# Patient Record
Sex: FEMALE | Race: WHITE | NOT HISPANIC OR LATINO | Employment: FULL TIME | URBAN - METROPOLITAN AREA
[De-identification: names, ages, dates, MRNs, and addresses within clinical notes are randomized per-mention and may not be internally consistent; named-entity substitution may affect disease eponyms.]

---

## 2023-01-20 PROBLEM — E03.9 HYPOTHYROID: Status: ACTIVE | Noted: 2023-01-20

## 2023-01-20 PROBLEM — M53.3 COCCYXDYNIA: Status: ACTIVE | Noted: 2023-01-20

## 2023-01-20 PROBLEM — N39.0 UTI (URINARY TRACT INFECTION): Status: ACTIVE | Noted: 2023-01-20

## 2023-01-20 PROBLEM — M62.838 MUSCLE SPASM: Status: ACTIVE | Noted: 2023-01-20

## 2023-01-20 PROBLEM — G89.29 BACK PAIN, CHRONIC: Status: ACTIVE | Noted: 2023-01-20

## 2023-01-20 PROBLEM — I87.2 VENOUS INSUFFICIENCY OF LOWER EXTREMITY: Status: ACTIVE | Noted: 2023-01-20

## 2023-01-20 PROBLEM — M54.50 LOW BACK PAIN: Status: ACTIVE | Noted: 2023-01-20

## 2023-01-20 PROBLEM — R11.2 NAUSEA AND/OR VOMITING: Status: ACTIVE | Noted: 2023-01-20

## 2023-01-20 PROBLEM — M79.18 GLUTEAL PAIN: Status: ACTIVE | Noted: 2023-01-20

## 2023-01-20 PROBLEM — R30.0 DYSURIA: Status: ACTIVE | Noted: 2023-01-20

## 2023-01-20 PROBLEM — Z20.828 EXPOSURE TO SARS VIRUS: Status: ACTIVE | Noted: 2023-01-20

## 2023-01-20 PROBLEM — J20.9 ACUTE BRONCHITIS WITH COPD (MULTI): Status: ACTIVE | Noted: 2023-01-20

## 2023-01-20 PROBLEM — I87.302 STASIS EDEMA OF LEFT LOWER EXTREMITY: Status: ACTIVE | Noted: 2023-01-20

## 2023-01-20 PROBLEM — M19.90 ARTHRITIS: Status: ACTIVE | Noted: 2023-01-20

## 2023-01-20 PROBLEM — G45.9 TRANSIENT ISCHEMIC ATTACK: Status: ACTIVE | Noted: 2023-01-20

## 2023-01-20 PROBLEM — E78.00 HYPERCHOLESTEREMIA: Status: ACTIVE | Noted: 2023-01-20

## 2023-01-20 PROBLEM — J02.9 SORE THROAT: Status: ACTIVE | Noted: 2023-01-20

## 2023-01-20 PROBLEM — M19.049 HAND ARTHRITIS: Status: ACTIVE | Noted: 2023-01-20

## 2023-01-20 PROBLEM — G47.00 INSOMNIA: Status: ACTIVE | Noted: 2023-01-20

## 2023-01-20 PROBLEM — S39.012A ACUTE MYOFASCIAL STRAIN OF LUMBOSACRAL REGION: Status: ACTIVE | Noted: 2023-01-20

## 2023-01-20 PROBLEM — M53.3 MASS OF SACRUM: Status: ACTIVE | Noted: 2023-01-20

## 2023-01-20 PROBLEM — R60.0 BILATERAL EDEMA OF LOWER EXTREMITY: Status: ACTIVE | Noted: 2023-01-20

## 2023-01-20 PROBLEM — E66.812 CLASS 2 OBESITY WITH BODY MASS INDEX (BMI) OF 38.0 TO 38.9 IN ADULT: Status: ACTIVE | Noted: 2023-01-20

## 2023-01-20 PROBLEM — R73.9 HYPERGLYCEMIA: Status: ACTIVE | Noted: 2023-01-20

## 2023-01-20 PROBLEM — E66.9 CLASS 2 OBESITY WITH BODY MASS INDEX (BMI) OF 38.0 TO 38.9 IN ADULT: Status: ACTIVE | Noted: 2023-01-20

## 2023-01-20 PROBLEM — I73.9: Status: ACTIVE | Noted: 2023-01-20

## 2023-01-20 PROBLEM — K21.9 GERD (GASTROESOPHAGEAL REFLUX DISEASE): Status: ACTIVE | Noted: 2023-01-20

## 2023-01-20 PROBLEM — R05.9 COUGH: Status: ACTIVE | Noted: 2023-01-20

## 2023-01-20 PROBLEM — I10 HTN (HYPERTENSION): Status: ACTIVE | Noted: 2023-01-20

## 2023-01-20 PROBLEM — R19.4 BOWEL HABIT CHANGES: Status: ACTIVE | Noted: 2023-01-20

## 2023-01-20 PROBLEM — E11.9 DM TYPE 2, NOT AT GOAL (MULTI): Status: ACTIVE | Noted: 2023-01-20

## 2023-01-20 PROBLEM — E11.9 DIABETES MELLITUS, STABLE (MULTI): Status: ACTIVE | Noted: 2023-01-20

## 2023-01-20 PROBLEM — R10.13 ABDOMINAL PAIN, EPIGASTRIC: Status: ACTIVE | Noted: 2023-01-20

## 2023-01-20 PROBLEM — K62.89 MASS OF ANUS: Status: ACTIVE | Noted: 2023-01-20

## 2023-01-20 PROBLEM — K58.9 IRRITABLE BOWEL SYNDROME: Status: ACTIVE | Noted: 2023-01-20

## 2023-01-20 PROBLEM — S20.212S: Status: ACTIVE | Noted: 2023-01-20

## 2023-01-20 PROBLEM — R40.0 DAYTIME SOMNOLENCE: Status: ACTIVE | Noted: 2023-01-20

## 2023-01-20 PROBLEM — G56.01 ACUTE CARPAL TUNNEL SYNDROME, RIGHT: Status: ACTIVE | Noted: 2023-01-20

## 2023-01-20 PROBLEM — M54.9 BACK PAIN, CHRONIC: Status: ACTIVE | Noted: 2023-01-20

## 2023-01-20 PROBLEM — J44.0 ACUTE BRONCHITIS WITH COPD (MULTI): Status: ACTIVE | Noted: 2023-01-20

## 2023-01-20 PROBLEM — R05.3 COUGH, PERSISTENT: Status: ACTIVE | Noted: 2023-01-20

## 2023-01-20 PROBLEM — J44.1 COPD WITH ACUTE EXACERBATION (MULTI): Status: ACTIVE | Noted: 2023-01-20

## 2023-01-20 PROBLEM — N30.01 ACUTE CYSTITIS WITH HEMATURIA: Status: ACTIVE | Noted: 2023-01-20

## 2023-01-20 PROBLEM — F17.210 CIGARETTE NICOTINE DEPENDENCE WITHOUT COMPLICATION: Status: ACTIVE | Noted: 2023-01-20

## 2023-01-20 RX ORDER — QUETIAPINE FUMARATE 50 MG/1
50 TABLET, FILM COATED ORAL NIGHTLY
COMMUNITY
End: 2024-01-30 | Stop reason: SDUPTHER

## 2023-01-20 RX ORDER — ROSUVASTATIN CALCIUM 40 MG/1
40 TABLET, COATED ORAL DAILY
COMMUNITY
End: 2023-11-13

## 2023-01-20 RX ORDER — FUROSEMIDE 20 MG/1
20 TABLET ORAL DAILY PRN
COMMUNITY
End: 2023-04-15

## 2023-01-20 RX ORDER — METFORMIN HYDROCHLORIDE 500 MG/1
500 TABLET ORAL 2 TIMES DAILY
COMMUNITY

## 2023-01-20 RX ORDER — AMMONIUM LACTATE 12 G/100G
LOTION TOPICAL AS NEEDED
COMMUNITY

## 2023-01-20 RX ORDER — IPRATROPIUM BROMIDE AND ALBUTEROL SULFATE 2.5; .5 MG/3ML; MG/3ML
3 SOLUTION RESPIRATORY (INHALATION) EVERY 4 HOURS PRN
COMMUNITY

## 2023-01-20 RX ORDER — OXYBUTYNIN CHLORIDE 15 MG/1
15 TABLET, EXTENDED RELEASE ORAL DAILY
COMMUNITY
End: 2024-05-01

## 2023-01-20 RX ORDER — PHENOBARBITAL, HYOSCYAMINE SULFATE, ATROPINE SULFATE AND SCOPOLAMINE HYDROBROMIDE .0194; .1037; 16.2; .0065 MG/1; MG/1; MG/1; MG/1
1-2 TABLET ORAL 3 TIMES DAILY
COMMUNITY
End: 2024-03-26 | Stop reason: SDUPTHER

## 2023-01-20 RX ORDER — LISINOPRIL 5 MG/1
5 TABLET ORAL DAILY
COMMUNITY
End: 2023-04-20 | Stop reason: SDUPTHER

## 2023-01-20 RX ORDER — LEVOTHYROXINE SODIUM 137 UG/1
137 CAPSULE ORAL DAILY
COMMUNITY
End: 2023-09-26 | Stop reason: SDUPTHER

## 2023-01-20 RX ORDER — VALACYCLOVIR HYDROCHLORIDE 500 MG/1
500 TABLET, FILM COATED ORAL 3 TIMES DAILY
COMMUNITY
End: 2023-09-12 | Stop reason: SDUPTHER

## 2023-01-20 RX ORDER — SEMAGLUTIDE 1.34 MG/ML
INJECTION, SOLUTION SUBCUTANEOUS
COMMUNITY
End: 2023-06-13 | Stop reason: SDUPTHER

## 2023-04-14 DIAGNOSIS — I73.9: Primary | ICD-10-CM

## 2023-04-15 RX ORDER — FUROSEMIDE 20 MG/1
TABLET ORAL
Qty: 30 TABLET | Refills: 0 | Status: SHIPPED | OUTPATIENT
Start: 2023-04-15 | End: 2023-04-20 | Stop reason: SDUPTHER

## 2023-04-20 DIAGNOSIS — I73.9: ICD-10-CM

## 2023-04-20 DIAGNOSIS — I10 PRIMARY HYPERTENSION: Primary | ICD-10-CM

## 2023-04-20 RX ORDER — LISINOPRIL 5 MG/1
5 TABLET ORAL DAILY
Qty: 90 TABLET | Refills: 3 | Status: SHIPPED | OUTPATIENT
Start: 2023-04-20 | End: 2023-04-25 | Stop reason: SDUPTHER

## 2023-04-20 RX ORDER — FUROSEMIDE 20 MG/1
20 TABLET ORAL DAILY PRN
Qty: 90 TABLET | Refills: 3 | Status: SHIPPED | OUTPATIENT
Start: 2023-04-20 | End: 2024-04-19

## 2023-04-25 DIAGNOSIS — I10 PRIMARY HYPERTENSION: ICD-10-CM

## 2023-04-25 RX ORDER — LISINOPRIL 5 MG/1
5 TABLET ORAL DAILY
Qty: 90 TABLET | Refills: 3 | Status: SHIPPED | OUTPATIENT
Start: 2023-04-25 | End: 2024-05-01

## 2023-06-13 DIAGNOSIS — E11.9 DM TYPE 2, NOT AT GOAL (MULTI): Primary | ICD-10-CM

## 2023-06-13 RX ORDER — SEMAGLUTIDE 1.34 MG/ML
0.5 INJECTION, SOLUTION SUBCUTANEOUS
Qty: 1 EACH | Refills: 1 | Status: SHIPPED | OUTPATIENT
Start: 2023-06-13 | End: 2023-09-12

## 2023-06-24 DIAGNOSIS — K21.9 GASTROESOPHAGEAL REFLUX DISEASE WITHOUT ESOPHAGITIS: Primary | ICD-10-CM

## 2023-06-26 RX ORDER — OMEPRAZOLE 40 MG/1
CAPSULE, DELAYED RELEASE ORAL
Qty: 90 CAPSULE | Refills: 0 | Status: SHIPPED | OUTPATIENT
Start: 2023-06-26 | End: 2023-10-09

## 2023-09-12 ENCOUNTER — LAB (OUTPATIENT)
Dept: LAB | Facility: LAB | Age: 63
End: 2023-09-12
Payer: COMMERCIAL

## 2023-09-12 ENCOUNTER — OFFICE VISIT (OUTPATIENT)
Dept: PRIMARY CARE | Facility: CLINIC | Age: 63
End: 2023-09-12
Payer: COMMERCIAL

## 2023-09-12 VITALS
BODY MASS INDEX: 41.62 KG/M2 | SYSTOLIC BLOOD PRESSURE: 124 MMHG | OXYGEN SATURATION: 92 % | HEART RATE: 88 BPM | WEIGHT: 265.2 LBS | HEIGHT: 67 IN | DIASTOLIC BLOOD PRESSURE: 74 MMHG

## 2023-09-12 DIAGNOSIS — E55.9 VITAMIN D DEFICIENCY: ICD-10-CM

## 2023-09-12 DIAGNOSIS — I15.9 SECONDARY HYPERTENSION: ICD-10-CM

## 2023-09-12 DIAGNOSIS — J44.1 COPD WITH ACUTE EXACERBATION (MULTI): ICD-10-CM

## 2023-09-12 DIAGNOSIS — Z00.00 HEALTHCARE MAINTENANCE: ICD-10-CM

## 2023-09-12 DIAGNOSIS — E11.9 DM TYPE 2, NOT AT GOAL (MULTI): ICD-10-CM

## 2023-09-12 DIAGNOSIS — B00.1 RECURRENT COLD SORES: ICD-10-CM

## 2023-09-12 DIAGNOSIS — E78.00 HYPERCHOLESTEREMIA: ICD-10-CM

## 2023-09-12 DIAGNOSIS — E03.9 HYPOTHYROIDISM, UNSPECIFIED TYPE: ICD-10-CM

## 2023-09-12 DIAGNOSIS — Z13.820 OSTEOPOROSIS SCREENING: ICD-10-CM

## 2023-09-12 DIAGNOSIS — E11.9 DM TYPE 2, NOT AT GOAL (MULTI): Primary | ICD-10-CM

## 2023-09-12 DIAGNOSIS — Z12.31 ENCOUNTER FOR SCREENING MAMMOGRAM FOR MALIGNANT NEOPLASM OF BREAST: ICD-10-CM

## 2023-09-12 LAB
ALANINE AMINOTRANSFERASE (SGPT) (U/L) IN SER/PLAS: 14 U/L (ref 7–45)
ALBUMIN (G/DL) IN SER/PLAS: 4.3 G/DL (ref 3.4–5)
ALBUMIN (MG/L) IN URINE: 14.3 MG/L
ALBUMIN/CREATININE (UG/MG) IN URINE: 13 UG/MG CRT (ref 0–30)
ALKALINE PHOSPHATASE (U/L) IN SER/PLAS: 99 U/L (ref 33–136)
ANION GAP IN SER/PLAS: 9 MMOL/L (ref 10–20)
APPEARANCE, URINE: CLEAR
ASPARTATE AMINOTRANSFERASE (SGOT) (U/L) IN SER/PLAS: 12 U/L (ref 9–39)
BILIRUBIN TOTAL (MG/DL) IN SER/PLAS: 0.4 MG/DL (ref 0–1.2)
BILIRUBIN, URINE: NEGATIVE
BLOOD, URINE: NEGATIVE
CALCIDIOL (25 OH VITAMIN D3) (NG/ML) IN SER/PLAS: 13 NG/ML
CALCIUM (MG/DL) IN SER/PLAS: 10.8 MG/DL (ref 8.6–10.6)
CARBON DIOXIDE, TOTAL (MMOL/L) IN SER/PLAS: 32 MMOL/L (ref 21–32)
CHLORIDE (MMOL/L) IN SER/PLAS: 102 MMOL/L (ref 98–107)
CHOLESTEROL (MG/DL) IN SER/PLAS: 138 MG/DL (ref 0–199)
CHOLESTEROL IN HDL (MG/DL) IN SER/PLAS: 38.7 MG/DL
CHOLESTEROL/HDL RATIO: 3.6
COLOR, URINE: YELLOW
CREATININE (MG/DL) IN SER/PLAS: 0.69 MG/DL (ref 0.5–1.05)
CREATININE (MG/DL) IN URINE: 110 MG/DL (ref 20–320)
ERYTHROCYTE DISTRIBUTION WIDTH (RATIO) BY AUTOMATED COUNT: 14.9 % (ref 11.5–14.5)
ERYTHROCYTE MEAN CORPUSCULAR HEMOGLOBIN CONCENTRATION (G/DL) BY AUTOMATED: 31.6 G/DL (ref 32–36)
ERYTHROCYTE MEAN CORPUSCULAR VOLUME (FL) BY AUTOMATED COUNT: 86 FL (ref 80–100)
ERYTHROCYTES (10*6/UL) IN BLOOD BY AUTOMATED COUNT: 5.2 X10E12/L (ref 4–5.2)
GFR FEMALE: >90 ML/MIN/1.73M2
GLUCOSE (MG/DL) IN SER/PLAS: 218 MG/DL (ref 74–99)
GLUCOSE, URINE: NEGATIVE MG/DL
HBA1C MFR BLD: 8.9 % (ref 4.2–6.5)
HEMATOCRIT (%) IN BLOOD BY AUTOMATED COUNT: 44.9 % (ref 36–46)
HEMOGLOBIN (G/DL) IN BLOOD: 14.2 G/DL (ref 12–16)
HEPATITIS C VIRUS AB PRESENCE IN SERUM: NONREACTIVE
KETONES, URINE: NEGATIVE MG/DL
LDL: 48 MG/DL (ref 0–99)
LEUKOCYTE ESTERASE, URINE: NEGATIVE
LEUKOCYTES (10*3/UL) IN BLOOD BY AUTOMATED COUNT: 8.8 X10E9/L (ref 4.4–11.3)
NITRITE, URINE: NEGATIVE
NON HDL CHOLESTEROL: 99 MG/DL
NRBC (PER 100 WBCS) BY AUTOMATED COUNT: 0 /100 WBC (ref 0–0)
PH, URINE: 6 (ref 5–8)
PLATELETS (10*3/UL) IN BLOOD AUTOMATED COUNT: 240 X10E9/L (ref 150–450)
POTASSIUM (MMOL/L) IN SER/PLAS: 4.3 MMOL/L (ref 3.5–5.3)
PROTEIN TOTAL: 7.6 G/DL (ref 6.4–8.2)
PROTEIN, URINE: NEGATIVE MG/DL
SODIUM (MMOL/L) IN SER/PLAS: 139 MMOL/L (ref 136–145)
SPECIFIC GRAVITY, URINE: 1.02 (ref 1–1.03)
THYROTROPIN (MIU/L) IN SER/PLAS BY DETECTION LIMIT <= 0.05 MIU/L: 6.61 MIU/L (ref 0.44–3.98)
THYROXINE (T4) FREE (NG/DL) IN SER/PLAS: 0.9 NG/DL (ref 0.78–1.48)
TRIGLYCERIDE (MG/DL) IN SER/PLAS: 258 MG/DL (ref 0–149)
UREA NITROGEN (MG/DL) IN SER/PLAS: 14 MG/DL (ref 6–23)
UROBILINOGEN, URINE: <2 MG/DL (ref 0–1.9)
VLDL: 52 MG/DL (ref 0–40)

## 2023-09-12 PROCEDURE — 82570 ASSAY OF URINE CREATININE: CPT

## 2023-09-12 PROCEDURE — 84443 ASSAY THYROID STIM HORMONE: CPT

## 2023-09-12 PROCEDURE — 1159F MED LIST DOCD IN RCRD: CPT | Performed by: FAMILY MEDICINE

## 2023-09-12 PROCEDURE — 85027 COMPLETE CBC AUTOMATED: CPT

## 2023-09-12 PROCEDURE — 82306 VITAMIN D 25 HYDROXY: CPT

## 2023-09-12 PROCEDURE — 90677 PCV20 VACCINE IM: CPT | Performed by: FAMILY MEDICINE

## 2023-09-12 PROCEDURE — 1125F AMNT PAIN NOTED PAIN PRSNT: CPT | Performed by: FAMILY MEDICINE

## 2023-09-12 PROCEDURE — 36415 COLL VENOUS BLD VENIPUNCTURE: CPT

## 2023-09-12 PROCEDURE — 99397 PER PM REEVAL EST PAT 65+ YR: CPT | Performed by: FAMILY MEDICINE

## 2023-09-12 PROCEDURE — 90471 IMMUNIZATION ADMIN: CPT | Performed by: FAMILY MEDICINE

## 2023-09-12 PROCEDURE — 80061 LIPID PANEL: CPT

## 2023-09-12 PROCEDURE — 81003 URINALYSIS AUTO W/O SCOPE: CPT

## 2023-09-12 PROCEDURE — 86803 HEPATITIS C AB TEST: CPT

## 2023-09-12 PROCEDURE — 4010F ACE/ARB THERAPY RXD/TAKEN: CPT | Performed by: FAMILY MEDICINE

## 2023-09-12 PROCEDURE — 82043 UR ALBUMIN QUANTITATIVE: CPT

## 2023-09-12 PROCEDURE — 90472 IMMUNIZATION ADMIN EACH ADD: CPT | Performed by: FAMILY MEDICINE

## 2023-09-12 PROCEDURE — 83036 HEMOGLOBIN GLYCOSYLATED A1C: CPT | Mod: CLIA WAIVED TEST | Performed by: FAMILY MEDICINE

## 2023-09-12 PROCEDURE — 1036F TOBACCO NON-USER: CPT | Performed by: FAMILY MEDICINE

## 2023-09-12 PROCEDURE — 99213 OFFICE O/P EST LOW 20 MIN: CPT | Performed by: FAMILY MEDICINE

## 2023-09-12 PROCEDURE — 90662 IIV NO PRSV INCREASED AG IM: CPT | Performed by: FAMILY MEDICINE

## 2023-09-12 PROCEDURE — 80053 COMPREHEN METABOLIC PANEL: CPT

## 2023-09-12 PROCEDURE — 3078F DIAST BP <80 MM HG: CPT | Performed by: FAMILY MEDICINE

## 2023-09-12 PROCEDURE — 3052F HG A1C>EQUAL 8.0%<EQUAL 9.0%: CPT | Performed by: FAMILY MEDICINE

## 2023-09-12 PROCEDURE — 3074F SYST BP LT 130 MM HG: CPT | Performed by: FAMILY MEDICINE

## 2023-09-12 PROCEDURE — 84439 ASSAY OF FREE THYROXINE: CPT

## 2023-09-12 RX ORDER — VALACYCLOVIR HYDROCHLORIDE 500 MG/1
500 TABLET, FILM COATED ORAL 3 TIMES DAILY PRN
Qty: 30 TABLET | Refills: 5 | Status: SHIPPED | OUTPATIENT
Start: 2023-09-12 | End: 2024-02-06 | Stop reason: SDUPTHER

## 2023-09-12 RX ORDER — TIRZEPATIDE 2.5 MG/.5ML
2.5 INJECTION, SOLUTION SUBCUTANEOUS
Qty: 6 ML | Refills: 3 | Status: SHIPPED | OUTPATIENT
Start: 2023-09-12 | End: 2024-01-03 | Stop reason: SDUPTHER

## 2023-09-12 ASSESSMENT — PAIN SCALES - GENERAL: PAINLEVEL: 2

## 2023-09-12 ASSESSMENT — ENCOUNTER SYMPTOMS: DEPRESSION: 1

## 2023-09-12 NOTE — PROGRESS NOTES
"Subjective   Patient ID: Danelle Jacobs is a 63 y.o. female who presents for Diabetes (Check sugar).    HPI patient here for diabetes check.  She has been under quite a bit of stress with her  battling lung cancer.  Patient is due multiple health maintenance updates.  We will complete a physical checkup.  Review of Systems    Objective   /74 (BP Location: Left arm, Patient Position: Sitting, BP Cuff Size: Large adult)   Pulse 88   Ht 1.702 m (5' 7\")   Wt 120 kg (265 lb 3.2 oz)   SpO2 92%   BMI 41.54 kg/m²     Physical Exam  Alert, pleasant and in no acute distress.  Neck: Supple, no JVD or carotid bruit  Heart: Regular rate and rhythm, no murmur  Lungs: Clear to auscultation  Lower extremities: No edema  Feet: clear of lesions  Assessment/Plan     A1c=8.9.  Will order Mounjaro.  Hopefully we will be able to get insurance to help cover as this will be useful in helping patient lose weight.  Health maintenance: Mammogram, DEXA scan, hep C screen, flu and pneumonia shot.  Stressed the importance of working on weight loss as well as generalized body exercises and fitness.  Medications reviewed.  Hypertension/hyperlipidemia stable.  Hypothyroidism we will recheck thyroid levels.  Vitamin D deficiency: We will recheck level.  Plan follow-up in 3 months.  We will contact patient within 1 week with lab results.     "

## 2023-09-26 DIAGNOSIS — E03.9 HYPOTHYROIDISM, UNSPECIFIED TYPE: Primary | ICD-10-CM

## 2023-09-26 RX ORDER — LEVOTHYROXINE SODIUM 13 UG/1
150 CAPSULE ORAL DAILY
Qty: 90 CAPSULE | Refills: 3 | Status: SHIPPED | OUTPATIENT
Start: 2023-09-26 | End: 2023-09-28

## 2023-11-13 DIAGNOSIS — E03.9 HYPOTHYROIDISM, UNSPECIFIED TYPE: ICD-10-CM

## 2023-11-13 DIAGNOSIS — E78.00 HYPERCHOLESTEREMIA: Primary | ICD-10-CM

## 2023-11-13 RX ORDER — LEVOTHYROXINE SODIUM 137 UG/1
137 TABLET ORAL DAILY
Qty: 90 TABLET | Refills: 3 | Status: SHIPPED | OUTPATIENT
Start: 2023-11-13

## 2023-11-13 RX ORDER — ROSUVASTATIN CALCIUM 40 MG/1
40 TABLET, COATED ORAL DAILY
Qty: 90 TABLET | Refills: 3 | Status: SHIPPED | OUTPATIENT
Start: 2023-11-13

## 2023-12-08 ENCOUNTER — TELEPHONE (OUTPATIENT)
Dept: PRIMARY CARE | Facility: CLINIC | Age: 63
End: 2023-12-08
Payer: COMMERCIAL

## 2023-12-08 DIAGNOSIS — J20.9 ACUTE BRONCHITIS, UNSPECIFIED ORGANISM: Primary | ICD-10-CM

## 2023-12-08 RX ORDER — AZITHROMYCIN 250 MG/1
TABLET, FILM COATED ORAL
Qty: 6 TABLET | Refills: 0 | Status: SHIPPED | OUTPATIENT
Start: 2023-12-08 | End: 2023-12-13

## 2023-12-08 NOTE — TELEPHONE ENCOUNTER
Pt left vm stating that she has a upper respitaory infection and knows that she does not have COVID. Pt would like rx sent to pharmacy. I also called pt and left vm to call back regarding COVID results.

## 2023-12-08 NOTE — TELEPHONE ENCOUNTER
Pt called back and tested she tested negative and is having a bad cough and would like antibiotics and a cough rx to be sent to pharmacy.

## 2023-12-27 ENCOUNTER — TELEPHONE (OUTPATIENT)
Dept: PRIMARY CARE | Facility: CLINIC | Age: 63
End: 2023-12-27
Payer: COMMERCIAL

## 2024-01-03 DIAGNOSIS — E11.9 DM TYPE 2, NOT AT GOAL (MULTI): ICD-10-CM

## 2024-01-03 RX ORDER — TIRZEPATIDE 2.5 MG/.5ML
2.5 INJECTION, SOLUTION SUBCUTANEOUS
Qty: 6 ML | Refills: 3 | Status: SHIPPED | OUTPATIENT
Start: 2024-01-03 | End: 2025-01-02

## 2024-01-03 NOTE — TELEPHONE ENCOUNTER
Pt left vm requesting refill for tirzepatide (Mounjaro) 2.5 mg/0.5 mL pen injector to be sent to mail pharmacy.

## 2024-01-18 ENCOUNTER — TELEPHONE (OUTPATIENT)
Dept: PRIMARY CARE | Facility: CLINIC | Age: 64
End: 2024-01-18
Payer: COMMERCIAL

## 2024-01-18 DIAGNOSIS — J20.9 ACUTE BRONCHITIS, UNSPECIFIED ORGANISM: Primary | ICD-10-CM

## 2024-01-18 RX ORDER — AZITHROMYCIN 250 MG/1
TABLET, FILM COATED ORAL
Qty: 6 TABLET | Refills: 0 | Status: SHIPPED | OUTPATIENT
Start: 2024-01-18 | End: 2024-01-23

## 2024-01-18 NOTE — TELEPHONE ENCOUNTER
Pt called stating that she has been sick for 1 week and Negative COVID test 01/15. Pt symptoms : congestion and dry cough. Pt states that she has been in and out of the hospital due that her  is admitted with pneumonia. Pt would like rx sent to pharmacy.

## 2024-01-30 DIAGNOSIS — G47.00 INSOMNIA, UNSPECIFIED TYPE: Primary | ICD-10-CM

## 2024-01-30 RX ORDER — QUETIAPINE FUMARATE 50 MG/1
50 TABLET, FILM COATED ORAL NIGHTLY
Qty: 90 TABLET | Refills: 3 | Status: SHIPPED | OUTPATIENT
Start: 2024-01-30 | End: 2025-01-29

## 2024-02-06 ENCOUNTER — TELEPHONE (OUTPATIENT)
Dept: PRIMARY CARE | Facility: CLINIC | Age: 64
End: 2024-02-06
Payer: COMMERCIAL

## 2024-02-06 DIAGNOSIS — B00.1 RECURRENT COLD SORES: ICD-10-CM

## 2024-02-06 RX ORDER — VALACYCLOVIR HYDROCHLORIDE 500 MG/1
500 TABLET, FILM COATED ORAL 3 TIMES DAILY PRN
Qty: 30 TABLET | Refills: 5 | Status: SHIPPED | OUTPATIENT
Start: 2024-02-06 | End: 2025-02-05

## 2024-03-14 DIAGNOSIS — J44.1 COPD WITH ACUTE EXACERBATION (MULTI): ICD-10-CM

## 2024-03-26 ENCOUNTER — TELEPHONE (OUTPATIENT)
Dept: PRIMARY CARE | Facility: CLINIC | Age: 64
End: 2024-03-26
Payer: COMMERCIAL

## 2024-03-26 DIAGNOSIS — Z00.00 WELLNESS EXAMINATION: Primary | ICD-10-CM

## 2024-03-26 RX ORDER — PHENOBARBITAL, HYOSCYAMINE SULFATE, ATROPINE SULFATE AND SCOPOLAMINE HYDROBROMIDE .0194; .1037; 16.2; .0065 MG/1; MG/1; MG/1; MG/1
1-2 TABLET ORAL 3 TIMES DAILY
Qty: 30 TABLET | Refills: 0 | Status: SHIPPED | OUTPATIENT
Start: 2024-03-26

## 2024-03-26 NOTE — TELEPHONE ENCOUNTER
Patient called asking if you can prescribe antibiotics for Throat, and ear ache to [facility] pharmacy. She also needs a refill for Donnatal

## 2024-03-27 ENCOUNTER — TELEPHONE (OUTPATIENT)
Dept: PRIMARY CARE | Facility: CLINIC | Age: 64
End: 2024-03-27
Payer: COMMERCIAL

## 2024-03-27 DIAGNOSIS — J06.9 URI, ACUTE: Primary | ICD-10-CM

## 2024-03-27 RX ORDER — AMOXICILLIN AND CLAVULANATE POTASSIUM 875; 125 MG/1; MG/1
TABLET, FILM COATED ORAL
Qty: 20 TABLET | Refills: 0 | Status: SHIPPED | OUTPATIENT
Start: 2024-03-27 | End: 2024-05-01 | Stop reason: WASHOUT

## 2024-03-27 NOTE — TELEPHONE ENCOUNTER
Pt called again today and states that she called yesterday looking for an antibiotic for her throat. Pt states that she is having a hard time swallowing and now her ear is bothering her. Pt would like to know if you could send something in for her. Pharmacy is on file and no allergies to any medications.

## 2024-04-12 ENCOUNTER — TELEPHONE (OUTPATIENT)
Dept: PRIMARY CARE | Facility: CLINIC | Age: 64
End: 2024-04-12
Payer: COMMERCIAL

## 2024-04-12 DIAGNOSIS — J01.90 ACUTE SINUSITIS, RECURRENCE NOT SPECIFIED, UNSPECIFIED LOCATION: Primary | ICD-10-CM

## 2024-04-12 RX ORDER — AMOXICILLIN 500 MG/1
500 CAPSULE ORAL 3 TIMES DAILY
Qty: 30 CAPSULE | Refills: 0 | Status: SHIPPED | OUTPATIENT
Start: 2024-04-12 | End: 2024-04-22

## 2024-04-12 NOTE — TELEPHONE ENCOUNTER
Patient called stating she has a sinus infection and a sore throat. They want to know if you can prescribe meds to [facility] on file

## 2024-04-29 ENCOUNTER — TELEPHONE (OUTPATIENT)
Dept: PRIMARY CARE | Facility: CLINIC | Age: 64
End: 2024-04-29
Payer: COMMERCIAL

## 2024-04-30 DIAGNOSIS — R11.2 NAUSEA AND VOMITING, UNSPECIFIED VOMITING TYPE: Primary | ICD-10-CM

## 2024-04-30 RX ORDER — ONDANSETRON 8 MG/1
4-8 TABLET, ORALLY DISINTEGRATING ORAL EVERY 8 HOURS PRN
Qty: 20 TABLET | Refills: 0 | Status: SHIPPED | OUTPATIENT
Start: 2024-04-30 | End: 2024-05-01 | Stop reason: ALTCHOICE

## 2024-04-30 NOTE — TELEPHONE ENCOUNTER
Patient left  stating she might have food poisoning and if you can prescribe anything to help.   C/o; nausea, vomiting, diarrhea, and fatigue  X 2day     She also wants to know if you an squeeze her in next week for a cortisone injection in her back for pain since she is going on vacation soon

## 2024-05-01 ENCOUNTER — OFFICE VISIT (OUTPATIENT)
Dept: PRIMARY CARE | Facility: CLINIC | Age: 64
End: 2024-05-01
Payer: COMMERCIAL

## 2024-05-01 VITALS
WEIGHT: 262.7 LBS | SYSTOLIC BLOOD PRESSURE: 126 MMHG | DIASTOLIC BLOOD PRESSURE: 74 MMHG | TEMPERATURE: 97.8 F | OXYGEN SATURATION: 84 % | BODY MASS INDEX: 41.14 KG/M2 | HEART RATE: 93 BPM

## 2024-05-01 DIAGNOSIS — E11.9 DM TYPE 2, NOT AT GOAL (MULTI): Primary | ICD-10-CM

## 2024-05-01 DIAGNOSIS — E03.9 HYPOTHYROIDISM, UNSPECIFIED TYPE: ICD-10-CM

## 2024-05-01 DIAGNOSIS — E78.00 HYPERCHOLESTEREMIA: ICD-10-CM

## 2024-05-01 DIAGNOSIS — I15.9 SECONDARY HYPERTENSION: ICD-10-CM

## 2024-05-01 DIAGNOSIS — M54.50 ACUTE LOW BACK PAIN WITHOUT SCIATICA, UNSPECIFIED BACK PAIN LATERALITY: ICD-10-CM

## 2024-05-01 LAB
ALBUMIN SERPL BCP-MCNC: 4 G/DL (ref 3.4–5)
ALP SERPL-CCNC: 118 U/L (ref 33–136)
ALT SERPL W P-5'-P-CCNC: 15 U/L (ref 7–45)
ANION GAP SERPL CALC-SCNC: 12 MMOL/L (ref 10–20)
AST SERPL W P-5'-P-CCNC: 13 U/L (ref 9–39)
BILIRUB SERPL-MCNC: 0.4 MG/DL (ref 0–1.2)
BUN SERPL-MCNC: 14 MG/DL (ref 6–23)
CALCIUM SERPL-MCNC: 10.7 MG/DL (ref 8.6–10.6)
CHLORIDE SERPL-SCNC: 99 MMOL/L (ref 98–107)
CO2 SERPL-SCNC: 32 MMOL/L (ref 21–32)
CREAT SERPL-MCNC: 0.64 MG/DL (ref 0.5–1.05)
EGFRCR SERPLBLD CKD-EPI 2021: >90 ML/MIN/1.73M*2
GLUCOSE SERPL-MCNC: 191 MG/DL (ref 74–99)
POTASSIUM SERPL-SCNC: 3.8 MMOL/L (ref 3.5–5.3)
PROT SERPL-MCNC: 7.5 G/DL (ref 6.4–8.2)
SODIUM SERPL-SCNC: 139 MMOL/L (ref 136–145)

## 2024-05-01 PROCEDURE — 1036F TOBACCO NON-USER: CPT | Performed by: FAMILY MEDICINE

## 2024-05-01 PROCEDURE — 80053 COMPREHEN METABOLIC PANEL: CPT

## 2024-05-01 PROCEDURE — 85027 COMPLETE CBC AUTOMATED: CPT

## 2024-05-01 PROCEDURE — 36415 COLL VENOUS BLD VENIPUNCTURE: CPT

## 2024-05-01 PROCEDURE — 99215 OFFICE O/P EST HI 40 MIN: CPT | Performed by: FAMILY MEDICINE

## 2024-05-01 PROCEDURE — 1159F MED LIST DOCD IN RCRD: CPT | Performed by: FAMILY MEDICINE

## 2024-05-01 PROCEDURE — 3074F SYST BP LT 130 MM HG: CPT | Performed by: FAMILY MEDICINE

## 2024-05-01 PROCEDURE — 1125F AMNT PAIN NOTED PAIN PRSNT: CPT | Performed by: FAMILY MEDICINE

## 2024-05-01 PROCEDURE — 83036 HEMOGLOBIN GLYCOSYLATED A1C: CPT

## 2024-05-01 PROCEDURE — 4010F ACE/ARB THERAPY RXD/TAKEN: CPT | Performed by: FAMILY MEDICINE

## 2024-05-01 PROCEDURE — 3078F DIAST BP <80 MM HG: CPT | Performed by: FAMILY MEDICINE

## 2024-05-01 RX ORDER — CYCLOBENZAPRINE HCL 10 MG
10 TABLET ORAL 3 TIMES DAILY PRN
Qty: 30 TABLET | Refills: 0 | Status: SHIPPED | OUTPATIENT
Start: 2024-05-01 | End: 2024-06-30

## 2024-05-01 ASSESSMENT — PAIN SCALES - GENERAL: PAINLEVEL: 8

## 2024-05-01 ASSESSMENT — ENCOUNTER SYMPTOMS: DEPRESSION: 1

## 2024-05-01 NOTE — PROGRESS NOTES
Subjective   Patient ID: Danelle Jacobs is a 63 y.o. female who presents for Back Pain (Back pain), Cough (Ongoing cough, sore throat. ), and Nausea (Nausea and vomiting for 1 week now. ).    HPI   Patient struggling since  .  Not taking her medicines other than quetiapine.  Patient has lumbar back pain.  It is in the upper lumbar mainly on the left.  She is planning to go to [place] next week and is worried about the long drive.      Review of Systems    Objective   /74 (BP Location: Left arm, Patient Position: Sitting, BP Cuff Size: Large adult)   Pulse 93   Temp 36.6 °C (97.8 °F) (Temporal)   Wt 119 kg (262 lb 11.2 oz)   SpO2 (!) 84%   BMI 41.14 kg/m²     Physical Exam  Alert, pleasant and in no acute distress.  Heart: Regular rate and rhythm without murmur  Lungs: Clear to auscultation  Lower extremities: No edema    Assessment/Plan   Problem List Items Addressed This Visit             ICD-10-CM    DM type 2, not at goal (Multi) - Primary E11.9    Relevant Orders    CBC    Comprehensive Metabolic Panel    Hemoglobin A1C    HTN (hypertension) I10    Relevant Orders    CBC    Comprehensive Metabolic Panel    Hypercholesteremia E78.00    Hypothyroid E03.9    Relevant Orders    CBC    Comprehensive Metabolic Panel     Other Visit Diagnoses         Codes    Acute low back pain without sciatica, unspecified back pain laterality     M54.50    Relevant Medications    cyclobenzaprine (Flexeril) 10 mg tablet        Patient here for follow-up.  She has a lot going on.  She has been quite stressed over the loss of her .  She has stopped most of her medications.  Long talk regarding the grieving process.  For her diabetes, encouraged her to get back on her medications to get her sugars under control.  Low back pain: For her back, I provided a prescription of cyclobenzaprine.  Discussed the importance of therapeutic exercises.  Discussed importance of getting out of the car and stretching  and moving during her drive next week.  Hypertension/hypercholesterolemia: Needs to get back on her medications and start working on her health.  Avenues to success discussed.  Hypothyroidism: Recommend restart her thyroid medication at 1/2 tablet daily for a few days and then resume a full tablet daily.  Follow-up in 3 months for routine checkup and labs

## 2024-05-02 LAB
ERYTHROCYTE [DISTWIDTH] IN BLOOD BY AUTOMATED COUNT: 16.1 % (ref 11.5–14.5)
EST. AVERAGE GLUCOSE BLD GHB EST-MCNC: 272 MG/DL
HBA1C MFR BLD: 11.1 %
HCT VFR BLD AUTO: 46.4 % (ref 36–46)
HGB BLD-MCNC: 13.9 G/DL (ref 12–16)
MCH RBC QN AUTO: 27.2 PG (ref 26–34)
MCHC RBC AUTO-ENTMCNC: 30 G/DL (ref 32–36)
MCV RBC AUTO: 91 FL (ref 80–100)
NRBC BLD-RTO: 0 /100 WBCS (ref 0–0)
PLATELET # BLD AUTO: 253 X10*3/UL (ref 150–450)
RBC # BLD AUTO: 5.11 X10*6/UL (ref 4–5.2)
WBC # BLD AUTO: 9.2 X10*3/UL (ref 4.4–11.3)

## 2024-05-17 ENCOUNTER — TELEPHONE (OUTPATIENT)
Dept: PRIMARY CARE | Facility: CLINIC | Age: 64
End: 2024-05-17
Payer: COMMERCIAL

## 2024-05-17 DIAGNOSIS — L50.2 URTICARIA DUE TO HEAT: Primary | ICD-10-CM

## 2024-05-17 RX ORDER — METHYLPREDNISOLONE 4 MG/1
TABLET ORAL
Qty: 21 TABLET | Refills: 0 | Status: SHIPPED | OUTPATIENT
Start: 2024-05-17 | End: 2024-05-24

## 2024-05-17 NOTE — TELEPHONE ENCOUNTER
Pt is in [place] and believes she has sun poisoning. Pt states she has red spots all tianna hear arms and hands and itches really bad. Pt states she tried benadryl am and cortisone cream and it has not help. Pt is requesting medication to be sent to pharmacy, pt will be back in town tomorrow.

## 2024-06-12 DIAGNOSIS — K21.9 GASTROESOPHAGEAL REFLUX DISEASE WITHOUT ESOPHAGITIS: ICD-10-CM

## 2024-06-12 PROBLEM — M46.1 SACROILIITIS, NOT ELSEWHERE CLASSIFIED (CMS-HCC): Status: ACTIVE | Noted: 2023-07-11

## 2024-06-12 PROBLEM — M47.817 LUMBOSACRAL SPONDYLOSIS: Status: ACTIVE | Noted: 2023-07-11

## 2024-06-12 RX ORDER — NITROFURANTOIN 25; 75 MG/1; MG/1
1 CAPSULE ORAL
COMMUNITY
Start: 2024-04-18

## 2024-06-12 RX ORDER — OMEPRAZOLE 40 MG/1
40 CAPSULE, DELAYED RELEASE ORAL
Qty: 90 CAPSULE | Refills: 2 | Status: SHIPPED | OUTPATIENT
Start: 2024-06-12 | End: 2025-03-09

## 2024-06-12 RX ORDER — OXYBUTYNIN CHLORIDE 15 MG/1
1 TABLET, EXTENDED RELEASE ORAL
COMMUNITY
Start: 2024-06-02

## 2024-06-17 DIAGNOSIS — M54.50 ACUTE LOW BACK PAIN WITHOUT SCIATICA, UNSPECIFIED BACK PAIN LATERALITY: ICD-10-CM

## 2024-06-17 RX ORDER — CYCLOBENZAPRINE HCL 10 MG
TABLET ORAL
Qty: 60 TABLET | Refills: 0 | Status: SHIPPED | OUTPATIENT
Start: 2024-06-17 | End: 2024-06-20

## 2024-06-20 DIAGNOSIS — M54.50 ACUTE LOW BACK PAIN WITHOUT SCIATICA, UNSPECIFIED BACK PAIN LATERALITY: ICD-10-CM

## 2024-06-20 RX ORDER — DULOXETIN HYDROCHLORIDE 30 MG/1
CAPSULE, DELAYED RELEASE ORAL
COMMUNITY
Start: 2024-06-19

## 2024-06-20 RX ORDER — CYCLOBENZAPRINE HCL 10 MG
TABLET ORAL
Qty: 60 TABLET | Refills: 17 | Status: SHIPPED | OUTPATIENT
Start: 2024-06-20

## 2024-07-14 DIAGNOSIS — I10 PRIMARY HYPERTENSION: ICD-10-CM

## 2024-07-14 DIAGNOSIS — I73.9: ICD-10-CM

## 2024-07-14 RX ORDER — FUROSEMIDE 20 MG/1
20 TABLET ORAL DAILY PRN
Qty: 90 TABLET | Refills: 1 | Status: SHIPPED | OUTPATIENT
Start: 2024-07-14 | End: 2024-07-15

## 2024-07-14 RX ORDER — LISINOPRIL 5 MG/1
5 TABLET ORAL DAILY
Qty: 90 TABLET | Refills: 1 | Status: SHIPPED | OUTPATIENT
Start: 2024-07-14 | End: 2024-07-15

## 2024-07-15 DIAGNOSIS — I10 PRIMARY HYPERTENSION: ICD-10-CM

## 2024-07-15 DIAGNOSIS — I73.9: ICD-10-CM

## 2024-07-15 RX ORDER — LISINOPRIL 5 MG/1
5 TABLET ORAL DAILY
Qty: 90 TABLET | Refills: 3 | Status: SHIPPED | OUTPATIENT
Start: 2024-07-15

## 2024-07-15 RX ORDER — FUROSEMIDE 20 MG/1
20 TABLET ORAL DAILY PRN
Qty: 90 TABLET | Refills: 1 | Status: SHIPPED | OUTPATIENT
Start: 2024-07-15

## 2024-07-24 ENCOUNTER — TELEPHONE (OUTPATIENT)
Dept: PRIMARY CARE | Facility: CLINIC | Age: 64
End: 2024-07-24
Payer: COMMERCIAL

## 2024-07-24 DIAGNOSIS — J01.90 ACUTE SINUSITIS, RECURRENCE NOT SPECIFIED, UNSPECIFIED LOCATION: Primary | ICD-10-CM

## 2024-07-24 RX ORDER — AMOXICILLIN 500 MG/1
500 CAPSULE ORAL 3 TIMES DAILY
Qty: 30 CAPSULE | Refills: 0 | Status: SHIPPED | OUTPATIENT
Start: 2024-07-24 | End: 2024-08-03

## 2024-07-24 NOTE — TELEPHONE ENCOUNTER
Patient left a VM stating she has a sore throat, sinus issues, and a dry cough. She tested negative for covid. Requesting an antibiotic be sent to [facility] on file.

## 2024-08-01 DIAGNOSIS — E03.9 HYPOTHYROIDISM, UNSPECIFIED TYPE: Primary | ICD-10-CM

## 2024-08-01 RX ORDER — LEVOTHYROXINE SODIUM 150 UG/1
150 TABLET ORAL DAILY
Qty: 90 TABLET | Refills: 1 | Status: SHIPPED | OUTPATIENT
Start: 2024-08-01

## 2024-08-06 ENCOUNTER — APPOINTMENT (OUTPATIENT)
Dept: PRIMARY CARE | Facility: CLINIC | Age: 64
End: 2024-08-06
Payer: COMMERCIAL

## 2024-08-06 VITALS
BODY MASS INDEX: 40.72 KG/M2 | SYSTOLIC BLOOD PRESSURE: 114 MMHG | OXYGEN SATURATION: 89 % | WEIGHT: 260 LBS | HEART RATE: 68 BPM | DIASTOLIC BLOOD PRESSURE: 74 MMHG

## 2024-08-06 DIAGNOSIS — J02.9 ACUTE PHARYNGITIS, UNSPECIFIED ETIOLOGY: ICD-10-CM

## 2024-08-06 DIAGNOSIS — E11.9 DM TYPE 2, NOT AT GOAL (MULTI): Primary | ICD-10-CM

## 2024-08-06 DIAGNOSIS — J44.1 COPD WITH ACUTE EXACERBATION (MULTI): ICD-10-CM

## 2024-08-06 PROBLEM — K76.0 STEATOSIS OF LIVER: Status: ACTIVE | Noted: 2024-08-06

## 2024-08-06 PROBLEM — Z86.39 HISTORY OF HYPOTHYROIDISM: Status: ACTIVE | Noted: 2024-08-06

## 2024-08-06 PROBLEM — R53.83 MALAISE AND FATIGUE: Status: ACTIVE | Noted: 2024-08-06

## 2024-08-06 PROBLEM — J18.0 BRONCHOPNEUMONIA: Status: ACTIVE | Noted: 2024-08-06

## 2024-08-06 PROBLEM — Z86.19 HISTORY OF VIRAL INFECTION: Status: ACTIVE | Noted: 2024-08-06

## 2024-08-06 PROBLEM — Z86.79 HISTORY OF RHEUMATIC FEVER: Status: ACTIVE | Noted: 2024-08-06

## 2024-08-06 PROBLEM — S39.92XA INJURY OF LOW BACK: Status: ACTIVE | Noted: 2024-08-06

## 2024-08-06 PROBLEM — G56.00 CARPAL TUNNEL SYNDROME: Status: ACTIVE | Noted: 2023-01-20

## 2024-08-06 PROBLEM — Z86.39 HISTORY OF ELEVATED LIPIDS: Status: ACTIVE | Noted: 2024-08-06

## 2024-08-06 PROBLEM — F17.200 NICOTINE DEPENDENCE: Status: ACTIVE | Noted: 2023-01-20

## 2024-08-06 PROBLEM — R53.81 MALAISE AND FATIGUE: Status: ACTIVE | Noted: 2024-08-06

## 2024-08-06 PROBLEM — M51.37 DDD (DEGENERATIVE DISC DISEASE), LUMBOSACRAL: Status: ACTIVE | Noted: 2023-07-11

## 2024-08-06 PROBLEM — S20.219A CONTUSION OF RIB: Status: ACTIVE | Noted: 2023-01-20

## 2024-08-06 PROBLEM — R25.2 SPASM: Status: ACTIVE | Noted: 2023-01-20

## 2024-08-06 PROBLEM — K62.9 DISORDER OF ANUS: Status: ACTIVE | Noted: 2024-08-06

## 2024-08-06 PROBLEM — Z86.59 HISTORY OF DEPRESSION: Status: ACTIVE | Noted: 2024-08-06

## 2024-08-06 PROBLEM — M51.379 DDD (DEGENERATIVE DISC DISEASE), LUMBOSACRAL: Status: ACTIVE | Noted: 2023-07-11

## 2024-08-06 PROBLEM — R19.8 ALTERED BOWEL FUNCTION: Status: ACTIVE | Noted: 2023-01-20

## 2024-08-06 PROBLEM — H66.90 OTITIS MEDIA: Status: ACTIVE | Noted: 2024-08-06

## 2024-08-06 LAB
HBA1C MFR BLD: 7.9 % (ref 4.2–6.5)
POC RAPID STREP: NEGATIVE

## 2024-08-06 PROCEDURE — 3051F HG A1C>EQUAL 7.0%<8.0%: CPT | Performed by: FAMILY MEDICINE

## 2024-08-06 PROCEDURE — 3078F DIAST BP <80 MM HG: CPT | Performed by: FAMILY MEDICINE

## 2024-08-06 PROCEDURE — 87880 STREP A ASSAY W/OPTIC: CPT | Performed by: FAMILY MEDICINE

## 2024-08-06 PROCEDURE — 4010F ACE/ARB THERAPY RXD/TAKEN: CPT | Performed by: FAMILY MEDICINE

## 2024-08-06 PROCEDURE — 83036 HEMOGLOBIN GLYCOSYLATED A1C: CPT | Mod: CLIA WAIVED TEST | Performed by: FAMILY MEDICINE

## 2024-08-06 PROCEDURE — 1158F ADVNC CARE PLAN TLK DOCD: CPT | Performed by: FAMILY MEDICINE

## 2024-08-06 PROCEDURE — 1159F MED LIST DOCD IN RCRD: CPT | Performed by: FAMILY MEDICINE

## 2024-08-06 PROCEDURE — 1125F AMNT PAIN NOTED PAIN PRSNT: CPT | Performed by: FAMILY MEDICINE

## 2024-08-06 PROCEDURE — 3074F SYST BP LT 130 MM HG: CPT | Performed by: FAMILY MEDICINE

## 2024-08-06 PROCEDURE — 99214 OFFICE O/P EST MOD 30 MIN: CPT | Performed by: FAMILY MEDICINE

## 2024-08-06 PROCEDURE — 1123F ACP DISCUSS/DSCN MKR DOCD: CPT | Performed by: FAMILY MEDICINE

## 2024-08-06 RX ORDER — TIRZEPATIDE 5 MG/.5ML
5 INJECTION, SOLUTION SUBCUTANEOUS
Qty: 6 ML | Refills: 1 | Status: SHIPPED | OUTPATIENT
Start: 2024-08-11

## 2024-08-06 RX ORDER — PREDNISONE 20 MG/1
20 TABLET ORAL DAILY
Qty: 7 TABLET | Refills: 0 | Status: SHIPPED | OUTPATIENT
Start: 2024-08-06 | End: 2024-08-13

## 2024-08-06 RX ORDER — LANCETS
EACH MISCELLANEOUS
Qty: 100 EACH | Refills: 1 | Status: SHIPPED | OUTPATIENT
Start: 2024-08-06

## 2024-08-06 RX ORDER — IBUPROFEN 200 MG
CAPSULE ORAL
Qty: 100 STRIP | Refills: 1 | Status: SHIPPED | OUTPATIENT
Start: 2024-08-06

## 2024-08-06 RX ORDER — DEXTROSE 4 G
TABLET,CHEWABLE ORAL
Qty: 1 EACH | Refills: 0 | Status: SHIPPED | OUTPATIENT
Start: 2024-08-06

## 2024-08-06 ASSESSMENT — ENCOUNTER SYMPTOMS: DEPRESSION: 1

## 2024-08-06 ASSESSMENT — PAIN SCALES - GENERAL: PAINLEVEL: 5

## 2024-08-06 NOTE — PROGRESS NOTES
Subjective   Patient ID: Danelle Jacobs is a 64 y.o. female who presents for Sore Throat (Sore throat x 2 weeks.) and Med Refill (Needs refill for [facility]    HPI   Patient here for evaluation of sore throat.  This has been ongoing for 2 weeks.  No fevers or chills.  Her grandchildren did have strep several weeks ago.  Minimal sinus congestion.  No cough.  Patient overall doing fairly well. her  passed away this past year.  Patient has been working on her diet to help her diabetes.  Exercise still limited.  Review of Systems    Objective   /74 (BP Location: Left arm, Patient Position: Sitting, BP Cuff Size: Large adult)   Pulse 68   Wt 118 kg (260 lb)   SpO2 (!) 89%   BMI 40.72 kg/m²     Physical Exam  Alert, pleasant and in no acute distress.  HEENT: Normocephalic, atraumatic.  Ears: Canals clear.  Tympanic membranes intact and pearly gray.  Nose: Nares reveal mildly inflamed turbinates.  Mouth: No mucosal lesions noted.  No pharyngeal erythema.  Neck: Supple.  No palpable lymphadenopathy.  Heart: Regular rate and rhythm without murmur  Lungs: Clear to auscultation    Assessment/Plan   Problem List Items Addressed This Visit             ICD-10-CM    DM type 2, not at goal (Multi) - Primary E11.9    Relevant Medications    lancets misc    blood-glucose meter misc    blood sugar diagnostic (Blood Glucose Test) strip    tirzepatide (Mounjaro) 5 mg/0.5 mL pen injector (Start on 8/11/2024)    Other Relevant Orders    POCT Glycosylated Hemoglobin (HGB A1C) docked device     Other Visit Diagnoses         Codes    Acute pharyngitis, unspecified etiology     J02.9    Relevant Medications    predniSONE (Deltasone) 20 mg tablet    Other Relevant Orders    POCT rapid strep A manually resulted    COPD with acute exacerbation (Multi)     J44.1    Relevant Orders    Disability Placard        1.  Diabetes type 2: A1c improved from 8.9-7.9.  We encouraged better physical activity  and continued work on diet.  Will increase Mounjaro from 2.5 to 5 mg.  2.  Acute pharyngitis: Examination normal and rapid strep negative.  Will give a short course of prednisone.  Patient advised to monitor her sugars closely.  She is to call and 5 days if not improving.  3.  COPD: Presently stable.  Disability placard provided  Follow-up in 3 to 4 months to recheck A1c

## 2024-08-23 ENCOUNTER — TELEPHONE (OUTPATIENT)
Dept: PRIMARY CARE | Facility: CLINIC | Age: 64
End: 2024-08-23
Payer: COMMERCIAL

## 2024-08-23 DIAGNOSIS — J44.1 COPD WITH ACUTE EXACERBATION (MULTI): Primary | ICD-10-CM

## 2024-08-23 RX ORDER — AZITHROMYCIN 250 MG/1
TABLET, FILM COATED ORAL
Qty: 6 TABLET | Refills: 0 | Status: SHIPPED | OUTPATIENT
Start: 2024-08-23 | End: 2024-08-28

## 2024-08-23 NOTE — TELEPHONE ENCOUNTER
Pt is dr. Schultz & seen recently but is having sinus pressure, cough, sore throat-some wheezing-covid negative.  Has rx for her nebulizer.  [phone]  No allergies  Ty

## 2024-09-06 ENCOUNTER — TELEPHONE (OUTPATIENT)
Dept: PRIMARY CARE | Facility: CLINIC | Age: 64
End: 2024-09-06
Payer: COMMERCIAL

## 2024-09-06 DIAGNOSIS — R05.2 SUBACUTE COUGH: Primary | ICD-10-CM

## 2024-09-06 RX ORDER — BENZONATATE 200 MG/1
200 CAPSULE ORAL 3 TIMES DAILY PRN
Qty: 42 CAPSULE | Refills: 0 | Status: SHIPPED | OUTPATIENT
Start: 2024-09-06 | End: 2024-10-06

## 2024-09-06 NOTE — TELEPHONE ENCOUNTER
Patient left a vm stating she just finished her antibiotic for a sinus infection but has a lingering cough.

## 2024-09-25 ENCOUNTER — APPOINTMENT (OUTPATIENT)
Dept: RADIOLOGY | Facility: CLINIC | Age: 64
End: 2024-09-25
Payer: COMMERCIAL

## 2024-09-26 ENCOUNTER — HOSPITAL ENCOUNTER (OUTPATIENT)
Dept: RADIOLOGY | Facility: CLINIC | Age: 64
Discharge: HOME | End: 2024-09-26
Payer: COMMERCIAL

## 2024-09-26 VITALS — BODY MASS INDEX: 40.83 KG/M2 | WEIGHT: 260.14 LBS | HEIGHT: 67 IN

## 2024-09-26 DIAGNOSIS — Z12.31 ENCOUNTER FOR SCREENING MAMMOGRAM FOR MALIGNANT NEOPLASM OF BREAST: ICD-10-CM

## 2024-09-26 PROCEDURE — 77067 SCR MAMMO BI INCL CAD: CPT

## 2024-10-08 ENCOUNTER — TELEPHONE (OUTPATIENT)
Dept: PRIMARY CARE | Facility: CLINIC | Age: 64
End: 2024-10-08
Payer: COMMERCIAL

## 2024-10-08 DIAGNOSIS — R92.8 ABNORMAL MAMMOGRAM: Primary | ICD-10-CM

## 2024-10-08 NOTE — TELEPHONE ENCOUNTER
Patient left a  requesting you give her a callback to discuss the results of her mammogram. # [phone].

## 2024-10-14 ENCOUNTER — TELEPHONE (OUTPATIENT)
Dept: PRIMARY CARE | Facility: CLINIC | Age: 64
End: 2024-10-14
Payer: COMMERCIAL

## 2024-10-17 ENCOUNTER — HOSPITAL ENCOUNTER (OUTPATIENT)
Dept: RADIOLOGY | Facility: CLINIC | Age: 64
Discharge: HOME | End: 2024-10-17
Payer: COMMERCIAL

## 2024-10-17 DIAGNOSIS — R92.8 ABNORMAL MAMMOGRAM: ICD-10-CM

## 2024-10-17 PROCEDURE — 76642 ULTRASOUND BREAST LIMITED: CPT | Mod: RT

## 2024-10-17 PROCEDURE — 77061 BREAST TOMOSYNTHESIS UNI: CPT | Mod: LT

## 2024-10-20 DIAGNOSIS — R92.8 ABNORMAL MAMMOGRAM: Primary | ICD-10-CM

## 2024-11-07 DIAGNOSIS — E78.00 HYPERCHOLESTEREMIA: ICD-10-CM

## 2024-11-07 RX ORDER — ROSUVASTATIN CALCIUM 40 MG/1
40 TABLET, COATED ORAL DAILY
Qty: 90 TABLET | Refills: 1 | Status: SHIPPED | OUTPATIENT
Start: 2024-11-07

## 2024-12-10 ENCOUNTER — APPOINTMENT (OUTPATIENT)
Dept: PRIMARY CARE | Facility: CLINIC | Age: 64
End: 2024-12-10
Payer: COMMERCIAL

## 2024-12-10 VITALS
HEART RATE: 70 BPM | OXYGEN SATURATION: 91 % | WEIGHT: 269.2 LBS | HEIGHT: 67 IN | DIASTOLIC BLOOD PRESSURE: 66 MMHG | SYSTOLIC BLOOD PRESSURE: 120 MMHG | BODY MASS INDEX: 42.25 KG/M2

## 2024-12-10 DIAGNOSIS — J44.0 ACUTE BRONCHITIS WITH CHRONIC OBSTRUCTIVE PULMONARY DISEASE (COPD) (MULTI): ICD-10-CM

## 2024-12-10 DIAGNOSIS — B00.1 RECURRENT COLD SORES: ICD-10-CM

## 2024-12-10 DIAGNOSIS — G47.00 INSOMNIA, UNSPECIFIED TYPE: ICD-10-CM

## 2024-12-10 DIAGNOSIS — M54.50 ACUTE LOW BACK PAIN WITHOUT SCIATICA, UNSPECIFIED BACK PAIN LATERALITY: ICD-10-CM

## 2024-12-10 DIAGNOSIS — E78.00 HYPERCHOLESTEREMIA: ICD-10-CM

## 2024-12-10 DIAGNOSIS — I73.9: ICD-10-CM

## 2024-12-10 DIAGNOSIS — J20.9 ACUTE BRONCHITIS WITH CHRONIC OBSTRUCTIVE PULMONARY DISEASE (COPD) (MULTI): ICD-10-CM

## 2024-12-10 DIAGNOSIS — Z00.00 WELLNESS EXAMINATION: ICD-10-CM

## 2024-12-10 DIAGNOSIS — E55.9 VITAMIN D DEFICIENCY: ICD-10-CM

## 2024-12-10 DIAGNOSIS — E11.9 DM TYPE 2, NOT AT GOAL: Primary | ICD-10-CM

## 2024-12-10 DIAGNOSIS — E03.9 HYPOTHYROIDISM, UNSPECIFIED TYPE: ICD-10-CM

## 2024-12-10 LAB — HBA1C MFR BLD: 8 % (ref 4.2–6.5)

## 2024-12-10 PROCEDURE — 3008F BODY MASS INDEX DOCD: CPT | Performed by: FAMILY MEDICINE

## 2024-12-10 PROCEDURE — 99214 OFFICE O/P EST MOD 30 MIN: CPT | Performed by: FAMILY MEDICINE

## 2024-12-10 PROCEDURE — 3078F DIAST BP <80 MM HG: CPT | Performed by: FAMILY MEDICINE

## 2024-12-10 PROCEDURE — 1125F AMNT PAIN NOTED PAIN PRSNT: CPT | Performed by: FAMILY MEDICINE

## 2024-12-10 PROCEDURE — 3052F HG A1C>EQUAL 8.0%<EQUAL 9.0%: CPT | Performed by: FAMILY MEDICINE

## 2024-12-10 PROCEDURE — 83036 HEMOGLOBIN GLYCOSYLATED A1C: CPT | Performed by: FAMILY MEDICINE

## 2024-12-10 PROCEDURE — 1036F TOBACCO NON-USER: CPT | Performed by: FAMILY MEDICINE

## 2024-12-10 PROCEDURE — 1159F MED LIST DOCD IN RCRD: CPT | Performed by: FAMILY MEDICINE

## 2024-12-10 PROCEDURE — 4010F ACE/ARB THERAPY RXD/TAKEN: CPT | Performed by: FAMILY MEDICINE

## 2024-12-10 PROCEDURE — 1160F RVW MEDS BY RX/DR IN RCRD: CPT | Performed by: FAMILY MEDICINE

## 2024-12-10 PROCEDURE — 3074F SYST BP LT 130 MM HG: CPT | Performed by: FAMILY MEDICINE

## 2024-12-10 RX ORDER — IPRATROPIUM BROMIDE AND ALBUTEROL SULFATE 2.5; .5 MG/3ML; MG/3ML
3 SOLUTION RESPIRATORY (INHALATION) EVERY 4 HOURS PRN
Qty: 180 ML | Refills: 1 | Status: SHIPPED | OUTPATIENT
Start: 2024-12-10

## 2024-12-10 RX ORDER — CYCLOBENZAPRINE HCL 10 MG
10 TABLET ORAL 3 TIMES DAILY PRN
Qty: 60 TABLET | Refills: 1 | Status: SHIPPED | OUTPATIENT
Start: 2024-12-10

## 2024-12-10 RX ORDER — QUETIAPINE FUMARATE 50 MG/1
50 TABLET, FILM COATED ORAL NIGHTLY
Qty: 90 TABLET | Refills: 1 | Status: SHIPPED | OUTPATIENT
Start: 2024-12-10 | End: 2025-06-08

## 2024-12-10 RX ORDER — TIRZEPATIDE 7.5 MG/.5ML
7.5 INJECTION, SOLUTION SUBCUTANEOUS WEEKLY
Qty: 2 ML | Refills: 3 | Status: SHIPPED | OUTPATIENT
Start: 2024-12-10

## 2024-12-10 RX ORDER — FUROSEMIDE 20 MG/1
20 TABLET ORAL DAILY PRN
Qty: 90 TABLET | Refills: 1 | Status: SHIPPED | OUTPATIENT
Start: 2024-12-10

## 2024-12-10 RX ORDER — ROSUVASTATIN CALCIUM 40 MG/1
40 TABLET, COATED ORAL DAILY
Qty: 90 TABLET | Refills: 1 | Status: SHIPPED | OUTPATIENT
Start: 2024-12-10

## 2024-12-10 RX ORDER — PHENOBARBITAL, HYOSCYAMINE SULFATE, ATROPINE SULFATE AND SCOPOLAMINE HYDROBROMIDE .0194; .1037; 16.2; .0065 MG/1; MG/1; MG/1; MG/1
1-2 TABLET ORAL 3 TIMES DAILY
Qty: 30 TABLET | Refills: 0 | Status: SHIPPED | OUTPATIENT
Start: 2024-12-10

## 2024-12-10 RX ORDER — VALACYCLOVIR HYDROCHLORIDE 500 MG/1
500 TABLET, FILM COATED ORAL 3 TIMES DAILY PRN
Qty: 30 TABLET | Refills: 5 | Status: SHIPPED | OUTPATIENT
Start: 2024-12-10 | End: 2025-12-10

## 2024-12-10 RX ORDER — LEVOTHYROXINE SODIUM 150 UG/1
150 TABLET ORAL DAILY
Qty: 90 TABLET | Refills: 1 | Status: SHIPPED | OUTPATIENT
Start: 2024-12-10

## 2024-12-10 RX ORDER — METFORMIN HYDROCHLORIDE 500 MG/1
500 TABLET ORAL 2 TIMES DAILY
Qty: 180 TABLET | Refills: 1 | Status: SHIPPED | OUTPATIENT
Start: 2024-12-10

## 2024-12-10 ASSESSMENT — PAIN SCALES - GENERAL: PAINLEVEL_OUTOF10: 8

## 2024-12-10 ASSESSMENT — ENCOUNTER SYMPTOMS: DEPRESSION: 1

## 2024-12-10 NOTE — PROGRESS NOTES
"Subjective   Patient ID: Danelle Jacobs is a 64 y.o. female who presents for Follow-up (4 month follow up for diabetes.) and Med Refill (Needs refills for donnatal, seroquel, rosuvastatin, mounjaro, valtrex,metformin, fursemide, flexeril, duo-pan, and levothyroxine to be sent to [facility].).    HPI   Here for follow up.  Not complaint with meds.  Lots of stress.   passed almost a year ago.  Job stress. Looking at custodial in a year.  Started exercising in pool.  Review of Systems    Objective   /66 (BP Location: Right arm, Patient Position: Sitting, BP Cuff Size: Large adult)   Pulse 70   Ht 1.702 m (5' 7.01\")   Wt 122 kg (269 lb 3.2 oz)   SpO2 91%   BMI 42.15 kg/m²     Physical Exam  Alert, pleasant and in no acute distress.  Heart: Regular rate and rhythm without murmur  Lungs: Clear to auscultation  Lower extremities: No edema    Assessment/Plan   Problem List Items Addressed This Visit             ICD-10-CM    Acute bronchitis with chronic obstructive pulmonary disease (COPD) (Multi) J44.0, J20.9    Relevant Medications    ipratropium-albuteroL (Duo-Neb) 0.5-2.5 mg/3 mL nebulizer solution    DM type 2, not at goal - Primary E11.9    Relevant Medications    tirzepatide (Mounjaro) 7.5 mg/0.5 mL pen injector    metFORMIN (Glucophage) 500 mg tablet    Other Relevant Orders    POCT Glycosylated Hemoglobin (HGB A1C) docked device    Hypothyroidism E03.9    Relevant Medications    levothyroxine (Synthroid, Levoxyl) 150 mcg tablet    Insomnia G47.00    Relevant Medications    QUEtiapine (SEROquel) 50 mg tablet     Other Visit Diagnoses         Codes    Hypercholesteremia     E78.00    Relevant Medications    rosuvastatin (Crestor) 40 mg tablet    Wellness examination     Z00.00    Relevant Medications    PHENobarbital-hyoscyamine-atropine-scopoloamine (Donnatal) 16.2-0.1037 -0.0194 mg tablet    Recurrent cold sores     B00.1    Relevant Medications    valACYclovir (Valtrex) 500 mg tablet    " Severe arterial insufficiency of right lower extremity (CMS-HCC)     I73.9    Relevant Medications    furosemide (Lasix) 20 mg tablet    Acute low back pain without sciatica, unspecified back pain laterality     M54.50    Relevant Medications    cyclobenzaprine (Flexeril) 10 mg tablet        Encouraged better compliance.  A1c=8.0.  Increase Mounjaro. See 3 mos  Meds reviewed and refills provided.  Long talk with patient regarding managing her multiple medical problems.  Encouraged her to keep going into work once weekly as this helps maintain her social outlet and she only has 1 more year of work.  Routine labs ordered.  Will call patient in 2 to 5 days with results.

## 2024-12-30 ENCOUNTER — APPOINTMENT (OUTPATIENT)
Dept: CARDIOLOGY | Facility: HOSPITAL | Age: 64
DRG: 189 | End: 2024-12-30
Payer: COMMERCIAL

## 2024-12-30 ENCOUNTER — APPOINTMENT (OUTPATIENT)
Dept: RADIOLOGY | Facility: HOSPITAL | Age: 64
DRG: 189 | End: 2024-12-30
Payer: COMMERCIAL

## 2024-12-30 ENCOUNTER — OFFICE VISIT (OUTPATIENT)
Dept: URGENT CARE | Age: 64
End: 2024-12-30
Payer: COMMERCIAL

## 2024-12-30 ENCOUNTER — HOSPITAL ENCOUNTER (INPATIENT)
Facility: HOSPITAL | Age: 64
DRG: 189 | End: 2024-12-30
Attending: STUDENT IN AN ORGANIZED HEALTH CARE EDUCATION/TRAINING PROGRAM | Admitting: STUDENT IN AN ORGANIZED HEALTH CARE EDUCATION/TRAINING PROGRAM
Payer: COMMERCIAL

## 2024-12-30 VITALS
WEIGHT: 265 LBS | HEART RATE: 73 BPM | DIASTOLIC BLOOD PRESSURE: 66 MMHG | TEMPERATURE: 97.7 F | SYSTOLIC BLOOD PRESSURE: 118 MMHG | RESPIRATION RATE: 16 BRPM | HEIGHT: 66 IN | OXYGEN SATURATION: 76 % | BODY MASS INDEX: 42.59 KG/M2

## 2024-12-30 DIAGNOSIS — K21.9 GASTROESOPHAGEAL REFLUX DISEASE WITHOUT ESOPHAGITIS: ICD-10-CM

## 2024-12-30 DIAGNOSIS — J06.9 UPPER RESPIRATORY INFECTION WITH COUGH AND CONGESTION: ICD-10-CM

## 2024-12-30 DIAGNOSIS — J96.01 ACUTE HYPOXIC RESPIRATORY FAILURE (MULTI): Primary | ICD-10-CM

## 2024-12-30 DIAGNOSIS — E11.9 DM TYPE 2, NOT AT GOAL: ICD-10-CM

## 2024-12-30 DIAGNOSIS — Z87.09 HISTORY OF COPD: ICD-10-CM

## 2024-12-30 DIAGNOSIS — J44.1 COPD EXACERBATION (MULTI): ICD-10-CM

## 2024-12-30 DIAGNOSIS — R09.02 HYPOXEMIA: Primary | ICD-10-CM

## 2024-12-30 LAB
ANION GAP BLDV CALCULATED.4IONS-SCNC: -1 MMOL/L (ref 10–25)
BASE EXCESS BLDV CALC-SCNC: 12.8 MMOL/L (ref -2–3)
BASOPHILS # BLD AUTO: 0.07 X10*3/UL (ref 0–0.1)
BASOPHILS NFR BLD AUTO: 0.7 %
BNP SERPL-MCNC: 24 PG/ML (ref 0–99)
BODY TEMPERATURE: 37 DEGREES CELSIUS
CA-I BLDV-SCNC: 1.37 MMOL/L (ref 1.1–1.33)
CARDIAC TROPONIN I PNL SERPL HS: 7 NG/L (ref 0–13)
CHLORIDE BLDV-SCNC: 101 MMOL/L (ref 98–107)
EOSINOPHIL # BLD AUTO: 0.18 X10*3/UL (ref 0–0.7)
EOSINOPHIL NFR BLD AUTO: 1.8 %
ERYTHROCYTE [DISTWIDTH] IN BLOOD BY AUTOMATED COUNT: 16.4 % (ref 11.5–14.5)
FLUAV RNA RESP QL NAA+PROBE: NOT DETECTED
FLUBV RNA RESP QL NAA+PROBE: NOT DETECTED
GLUCOSE BLDV-MCNC: 132 MG/DL (ref 74–99)
HCO3 BLDV-SCNC: 40.9 MMOL/L (ref 22–26)
HCT VFR BLD AUTO: 46.7 % (ref 36–46)
HCT VFR BLD EST: 44 % (ref 36–46)
HGB BLD-MCNC: 14.3 G/DL (ref 12–16)
HGB BLDV-MCNC: 14.7 G/DL (ref 12–16)
IMM GRANULOCYTES # BLD AUTO: 0.02 X10*3/UL (ref 0–0.7)
IMM GRANULOCYTES NFR BLD AUTO: 0.2 % (ref 0–0.9)
INHALED O2 CONCENTRATION: 36 %
LACTATE BLDV-SCNC: 1.5 MMOL/L (ref 0.4–2)
LYMPHOCYTES # BLD AUTO: 2.59 X10*3/UL (ref 1.2–4.8)
LYMPHOCYTES NFR BLD AUTO: 25.7 %
MCH RBC QN AUTO: 27.3 PG (ref 26–34)
MCHC RBC AUTO-ENTMCNC: 30.6 G/DL (ref 32–36)
MCV RBC AUTO: 89 FL (ref 80–100)
MONOCYTES # BLD AUTO: 0.53 X10*3/UL (ref 0.1–1)
MONOCYTES NFR BLD AUTO: 5.3 %
NEUTROPHILS # BLD AUTO: 6.67 X10*3/UL (ref 1.2–7.7)
NEUTROPHILS NFR BLD AUTO: 66.3 %
NRBC BLD-RTO: 0 /100 WBCS (ref 0–0)
OXYHGB MFR BLDV: 86.9 % (ref 45–75)
PCO2 BLDV: 66 MM HG (ref 41–51)
PH BLDV: 7.4 PH (ref 7.33–7.43)
PLATELET # BLD AUTO: 266 X10*3/UL (ref 150–450)
PO2 BLDV: 58 MM HG (ref 35–45)
POTASSIUM BLDV-SCNC: 4.3 MMOL/L (ref 3.5–5.3)
RBC # BLD AUTO: 5.23 X10*6/UL (ref 4–5.2)
SAO2 % BLDV: 90 % (ref 45–75)
SARS-COV-2 RNA RESP QL NAA+PROBE: NOT DETECTED
SODIUM BLDV-SCNC: 137 MMOL/L (ref 136–145)
WBC # BLD AUTO: 10.1 X10*3/UL (ref 4.4–11.3)

## 2024-12-30 PROCEDURE — 5A0935A ASSISTANCE WITH RESPIRATORY VENTILATION, LESS THAN 24 CONSECUTIVE HOURS, HIGH NASAL FLOW/VELOCITY: ICD-10-PCS

## 2024-12-30 PROCEDURE — 2500000002 HC RX 250 W HCPCS SELF ADMINISTERED DRUGS (ALT 637 FOR MEDICARE OP, ALT 636 FOR OP/ED)

## 2024-12-30 PROCEDURE — 1036F TOBACCO NON-USER: CPT | Performed by: PHYSICIAN ASSISTANT

## 2024-12-30 PROCEDURE — 3008F BODY MASS INDEX DOCD: CPT | Performed by: PHYSICIAN ASSISTANT

## 2024-12-30 PROCEDURE — 94640 AIRWAY INHALATION TREATMENT: CPT

## 2024-12-30 PROCEDURE — 36415 COLL VENOUS BLD VENIPUNCTURE: CPT | Performed by: NURSE PRACTITIONER

## 2024-12-30 PROCEDURE — 4010F ACE/ARB THERAPY RXD/TAKEN: CPT | Performed by: PHYSICIAN ASSISTANT

## 2024-12-30 PROCEDURE — 2500000005 HC RX 250 GENERAL PHARMACY W/O HCPCS: Performed by: STUDENT IN AN ORGANIZED HEALTH CARE EDUCATION/TRAINING PROGRAM

## 2024-12-30 PROCEDURE — 71046 X-RAY EXAM CHEST 2 VIEWS: CPT

## 2024-12-30 PROCEDURE — 3078F DIAST BP <80 MM HG: CPT | Performed by: PHYSICIAN ASSISTANT

## 2024-12-30 PROCEDURE — 99291 CRITICAL CARE FIRST HOUR: CPT | Performed by: STUDENT IN AN ORGANIZED HEALTH CARE EDUCATION/TRAINING PROGRAM

## 2024-12-30 PROCEDURE — 87634 RSV DNA/RNA AMP PROBE: CPT | Performed by: NURSE PRACTITIONER

## 2024-12-30 PROCEDURE — 3052F HG A1C>EQUAL 8.0%<EQUAL 9.0%: CPT | Performed by: PHYSICIAN ASSISTANT

## 2024-12-30 PROCEDURE — 36415 COLL VENOUS BLD VENIPUNCTURE: CPT

## 2024-12-30 PROCEDURE — 71046 X-RAY EXAM CHEST 2 VIEWS: CPT | Mod: FOREIGN READ | Performed by: RADIOLOGY

## 2024-12-30 PROCEDURE — 83880 ASSAY OF NATRIURETIC PEPTIDE: CPT | Performed by: NURSE PRACTITIONER

## 2024-12-30 PROCEDURE — 84484 ASSAY OF TROPONIN QUANT: CPT

## 2024-12-30 PROCEDURE — 2500000004 HC RX 250 GENERAL PHARMACY W/ HCPCS (ALT 636 FOR OP/ED)

## 2024-12-30 PROCEDURE — 87502 INFLUENZA DNA AMP PROBE: CPT | Performed by: NURSE PRACTITIONER

## 2024-12-30 PROCEDURE — 3074F SYST BP LT 130 MM HG: CPT | Performed by: PHYSICIAN ASSISTANT

## 2024-12-30 PROCEDURE — 80053 COMPREHEN METABOLIC PANEL: CPT

## 2024-12-30 PROCEDURE — 1159F MED LIST DOCD IN RCRD: CPT | Performed by: PHYSICIAN ASSISTANT

## 2024-12-30 PROCEDURE — 85025 COMPLETE CBC W/AUTO DIFF WBC: CPT | Performed by: NURSE PRACTITIONER

## 2024-12-30 PROCEDURE — 82435 ASSAY OF BLOOD CHLORIDE: CPT | Performed by: NURSE PRACTITIONER

## 2024-12-30 PROCEDURE — 96374 THER/PROPH/DIAG INJ IV PUSH: CPT

## 2024-12-30 PROCEDURE — 83735 ASSAY OF MAGNESIUM: CPT | Performed by: NURSE PRACTITIONER

## 2024-12-30 PROCEDURE — 99204 OFFICE O/P NEW MOD 45 MIN: CPT | Performed by: PHYSICIAN ASSISTANT

## 2024-12-30 PROCEDURE — 93005 ELECTROCARDIOGRAM TRACING: CPT

## 2024-12-30 PROCEDURE — 5A0935A ASSISTANCE WITH RESPIRATORY VENTILATION, LESS THAN 24 CONSECUTIVE HOURS, HIGH NASAL FLOW/VELOCITY: ICD-10-PCS | Performed by: STUDENT IN AN ORGANIZED HEALTH CARE EDUCATION/TRAINING PROGRAM

## 2024-12-30 RX ORDER — IPRATROPIUM BROMIDE AND ALBUTEROL SULFATE 2.5; .5 MG/3ML; MG/3ML
3 SOLUTION RESPIRATORY (INHALATION) EVERY 2 HOUR PRN
Status: DISCONTINUED | OUTPATIENT
Start: 2024-12-30 | End: 2025-01-06 | Stop reason: HOSPADM

## 2024-12-30 RX ORDER — IPRATROPIUM BROMIDE AND ALBUTEROL SULFATE 2.5; .5 MG/3ML; MG/3ML
9 SOLUTION RESPIRATORY (INHALATION) ONCE
Status: COMPLETED | OUTPATIENT
Start: 2024-12-30 | End: 2024-12-30

## 2024-12-30 RX ADMIN — METHYLPREDNISOLONE SODIUM SUCCINATE 125 MG: 125 INJECTION, POWDER, FOR SOLUTION INTRAMUSCULAR; INTRAVENOUS at 23:17

## 2024-12-30 RX ADMIN — IPRATROPIUM BROMIDE AND ALBUTEROL SULFATE 9 ML: .5; 3 SOLUTION RESPIRATORY (INHALATION) at 22:55

## 2024-12-30 RX ADMIN — Medication 60 L/MIN: at 23:42

## 2024-12-30 ASSESSMENT — PATIENT HEALTH QUESTIONNAIRE - PHQ9
1. LITTLE INTEREST OR PLEASURE IN DOING THINGS: NOT AT ALL
2. FEELING DOWN, DEPRESSED OR HOPELESS: NOT AT ALL
SUM OF ALL RESPONSES TO PHQ9 QUESTIONS 1 AND 2: 0

## 2024-12-30 ASSESSMENT — COLUMBIA-SUICIDE SEVERITY RATING SCALE - C-SSRS
1. IN THE PAST MONTH, HAVE YOU WISHED YOU WERE DEAD OR WISHED YOU COULD GO TO SLEEP AND NOT WAKE UP?: NO
2. HAVE YOU ACTUALLY HAD ANY THOUGHTS OF KILLING YOURSELF?: NO
6. HAVE YOU EVER DONE ANYTHING, STARTED TO DO ANYTHING, OR PREPARED TO DO ANYTHING TO END YOUR LIFE?: NO

## 2024-12-30 ASSESSMENT — ENCOUNTER SYMPTOMS
ALLERGIC/IMMUNOLOGIC NEGATIVE: 1
SHORTNESS OF BREATH: 1
CONSTITUTIONAL NEGATIVE: 1
COUGH: 1
EYES NEGATIVE: 1
ENDOCRINE NEGATIVE: 1
HEMATOLOGIC/LYMPHATIC NEGATIVE: 1
MUSCULOSKELETAL NEGATIVE: 1
SORE THROAT: 0
CARDIOVASCULAR NEGATIVE: 1
NEUROLOGICAL NEGATIVE: 1
PSYCHIATRIC NEGATIVE: 1
GASTROINTESTINAL NEGATIVE: 1

## 2024-12-30 ASSESSMENT — PAIN SCALES - GENERAL: PAINLEVEL_OUTOF10: 0 - NO PAIN

## 2024-12-30 ASSESSMENT — PAIN - FUNCTIONAL ASSESSMENT: PAIN_FUNCTIONAL_ASSESSMENT: 0-10

## 2024-12-30 NOTE — ED TRIAGE NOTES
The patient was seen and examined in triage.      History of Present Illness: 64-year-old female with past medical history pertinent for COPD on room air at baseline presents ED for evaluation of cough and hypoxemia.  Per patient she has had cold symptoms for a little under a week, presented to urgent care for evaluation and noted her pulse ox to be 73% on room air.  Patient did ambulate into the triage room without any dyspnea but was noted to be 79%.  It did increase to 89% after resting.  She is placed on 2 L nasal cannula with improvement of her hypoxemia.  She is additionally endorsing a productive cough with yellow sputum, congestion, sinus pressure and fatigue.  Denies any chest pain or lower extremity swelling.     Brief Physical Exam: Exam is limited by the patient sitting in a chair in triage.  Heart: Regular rate and rhythm.   Lungs: Clear to auscultation bilaterally.   Abdomen: Soft, nondistended, normoactive bowel sounds, nontender  Neuro: Patient is alert oriented x 3.  No focal neurological deficit.  NIH stroke scale 0.      Plan: Appropriate labs and diagnostic imaging were ordered.      For the remainder of the patient's workup and ED course, please refer to the main ED provider note. We discussed need for diagnostic testing including laboratory studies and imaging.  We also discussed that they may be asked to wait in the waiting room while these tests are pending.  They understand that if they choose to leave without having the testing completed or resulted that we cannot rule out acute life threatening illnesses and the risks involved could lead to worsening condition, permanent disability or even death.       Disclaimer: This note was dictated by speech recognition. Minor errors in transcription may be present. Please call if questions.

## 2024-12-30 NOTE — PROGRESS NOTES
Subjective   Patient ID: Danelle Jacobs is a 64 y.o. female. They present today with a chief complaint of Cough, Nasal Congestion, and Sinusitis (x1wk).    History of Present Illness    History provided by:  Patient   used: No    Cough  Associated symptoms include shortness of breath. Pertinent negatives include no ear pain or sore throat.   Sinusitis  Associated symptoms: congestion, cough and shortness of breath    Associated symptoms: no ear pain and no sore throat      This is a 64 yr old female here for respiratory sxs. Pt states cough productive of yellow/green sputum, dyspnea and yellow/green nasal congestion x 1 week. No ear pain, sore throat or peripheral edema.   Past Medical History  Allergies as of 12/30/2024    (No Known Allergies)       (Not in a hospital admission)       Past Medical History:   Diagnosis Date    Bronchopneumonia, unspecified organism     Bronchial pneumonia    Cellulitis of right lower limb     Cellulitis of right leg    Fatty (change of) liver, not elsewhere classified     Fatty infiltration of liver    Localized edema 03/04/2015    Leg edema    Other long term (current) drug therapy     Long-term use of high-risk medication    Other malaise     Malaise and fatigue    Otitis media, unspecified, left ear     Left otitis media    Personal history of diseases of the skin and subcutaneous tissue     History of actinic keratosis    Personal history of other diseases of the circulatory system     History of rheumatic fever    Personal history of other diseases of the female genital tract     History of ovarian cyst    Personal history of other diseases of the nervous system and sense organs     History of sciatica    Personal history of other diseases of the nervous system and sense organs     History of carpal tunnel syndrome    Personal history of other diseases of the respiratory system     History of bronchitis    Personal history of other diseases of the  "respiratory system     History of sinusitis    Personal history of other diseases of the respiratory system     History of chronic obstructive lung disease    Personal history of other endocrine, nutritional and metabolic disease     History of hypothyroidism    Personal history of other endocrine, nutritional and metabolic disease     History of hyperlipidemia    Personal history of other infectious and parasitic diseases     History of herpes labialis    Personal history of other mental and behavioral disorders     History of depression       Past Surgical History:   Procedure Laterality Date    APPENDECTOMY  05/07/2015    Appendectomy    CHOLECYSTECTOMY  12/31/2015    Cholecystectomy    FOOT SURGERY  12/31/2015    Foot Surgery    GALLBLADDER SURGERY  03/04/2015    Gallbladder Surgery    HERNIA REPAIR  03/04/2015    Hernia Repair    OTHER SURGICAL HISTORY  03/04/2015    Arthrodesis 1st Intermetatarsal Base Right Foot    OTHER SURGICAL HISTORY  03/04/2015    Biopsy Lung Percutaneous        reports that she quit smoking about 7 years ago. Her smoking use included cigarettes. She has never used smokeless tobacco. She reports that she does not drink alcohol and does not use drugs.    Review of Systems  Review of Systems   Constitutional: Negative.    HENT:  Positive for congestion. Negative for ear pain and sore throat.    Eyes: Negative.    Respiratory:  Positive for cough and shortness of breath.    Cardiovascular: Negative.    Gastrointestinal: Negative.    Endocrine: Negative.    Genitourinary: Negative.    Musculoskeletal: Negative.    Skin: Negative.    Allergic/Immunologic: Negative.    Neurological: Negative.    Hematological: Negative.    Psychiatric/Behavioral: Negative.     All other systems reviewed and are negative.       Objective    Vitals:    12/30/24 1439   BP: 118/66   Pulse: 73   Resp: 16   Temp: 36.5 °C (97.7 °F)   TempSrc: Oral   SpO2: (!) 76%   Weight: 120 kg (265 lb)   Height: 1.676 m (5' 6\") "     No LMP recorded. Patient is postmenopausal.    Physical Exam  Vitals and nursing note reviewed.   Constitutional:       Appearance: Normal appearance.   HENT:      Head: Normocephalic and atraumatic.      Right Ear: Tympanic membrane and ear canal normal.      Left Ear: Tympanic membrane and ear canal normal.      Mouth/Throat:      Mouth: Mucous membranes are moist.      Pharynx: Oropharynx is clear.   Cardiovascular:      Rate and Rhythm: Normal rate and regular rhythm.   Pulmonary:      Effort: Pulmonary effort is normal. No respiratory distress.      Comments: Decreased BS throughout  Musculoskeletal:      Cervical back: Neck supple.   Lymphadenopathy:      Cervical: No cervical adenopathy.   Skin:     General: Skin is warm and dry.   Neurological:      General: No focal deficit present.      Mental Status: She is alert and oriented to person, place, and time.   Psychiatric:         Mood and Affect: Mood normal.         Behavior: Behavior normal.       Procedures    Point of Care Test & Imaging Results from this visit  No results found for this visit on 12/30/24.   No results found.    Diagnostic study results (if any) were reviewed by Saira Sanchez PA-C.    Assessment/Plan   Allergies, medications, history, and pertinent labs/EKGs/Imaging reviewed by Saira Sanchez PA-C.        Orders and Diagnoses  Diagnoses and all orders for this visit:  Hypoxemia  Upper respiratory infection with cough and congestion  History of COPD     Plan:  Low pulse ox in dept  Advised to go to [facility] now for more workup and care    Patient disposition: ED    Electronically signed by Saira Sanchez PA-C  2:57 PM

## 2024-12-30 NOTE — ED TRIAGE NOTES
PT. C/O COUGH, CONGESTION STARTING ABOUT 12/24, SENT BY URGENT CARE FOR LOW PULSE OX. PT. O2 SAT. 79% UPON WALKING INTO TRIAGE, INCREASED TO 89% AFTER RESTING. PT. PLACED ON 2L PER NC IN TRIAGE, SAT. INCREASED TO 94%. PT. DENIES SOB.

## 2024-12-31 ENCOUNTER — APPOINTMENT (OUTPATIENT)
Dept: RADIOLOGY | Facility: HOSPITAL | Age: 64
DRG: 189 | End: 2024-12-31
Payer: COMMERCIAL

## 2024-12-31 PROBLEM — J96.01 ACUTE HYPOXIC RESPIRATORY FAILURE (MULTI): Status: ACTIVE | Noted: 2024-12-31

## 2024-12-31 LAB
ALBUMIN SERPL BCP-MCNC: 3.9 G/DL (ref 3.4–5)
ALP SERPL-CCNC: 91 U/L (ref 33–136)
ALT SERPL W P-5'-P-CCNC: 13 U/L (ref 7–45)
ANION GAP SERPL CALC-SCNC: 11 MMOL/L (ref 10–20)
AST SERPL W P-5'-P-CCNC: 14 U/L (ref 9–39)
ATRIAL RATE: 68 BPM
BILIRUB SERPL-MCNC: 0.4 MG/DL (ref 0–1.2)
BUN SERPL-MCNC: 10 MG/DL (ref 6–23)
CALCIUM SERPL-MCNC: 10.3 MG/DL (ref 8.6–10.3)
CARDIAC TROPONIN I PNL SERPL HS: 7 NG/L (ref 0–13)
CHLORIDE SERPL-SCNC: 99 MMOL/L (ref 98–107)
CO2 SERPL-SCNC: 33 MMOL/L (ref 21–32)
CREAT SERPL-MCNC: 0.71 MG/DL (ref 0.5–1.05)
EGFRCR SERPLBLD CKD-EPI 2021: >90 ML/MIN/1.73M*2
GLUCOSE BLD MANUAL STRIP-MCNC: 243 MG/DL (ref 74–99)
GLUCOSE BLD MANUAL STRIP-MCNC: 253 MG/DL (ref 74–99)
GLUCOSE BLD MANUAL STRIP-MCNC: 266 MG/DL (ref 74–99)
GLUCOSE SERPL-MCNC: 103 MG/DL (ref 74–99)
MAGNESIUM SERPL-MCNC: 2.24 MG/DL (ref 1.6–2.4)
P AXIS: 67 DEGREES
P OFFSET: 202 MS
P ONSET: 138 MS
POTASSIUM SERPL-SCNC: 4 MMOL/L (ref 3.5–5.3)
PR INTERVAL: 164 MS
PROT SERPL-MCNC: 7.6 G/DL (ref 6.4–8.2)
Q ONSET: 220 MS
QRS COUNT: 11 BEATS
QRS DURATION: 84 MS
QT INTERVAL: 418 MS
QTC CALCULATION(BAZETT): 444 MS
QTC FREDERICIA: 436 MS
R AXIS: 94 DEGREES
RSV RNA RESP QL NAA+PROBE: NOT DETECTED
SODIUM SERPL-SCNC: 139 MMOL/L (ref 136–145)
T AXIS: 62 DEGREES
T OFFSET: 429 MS
VENTRICULAR RATE: 68 BPM

## 2024-12-31 PROCEDURE — 94640 AIRWAY INHALATION TREATMENT: CPT

## 2024-12-31 PROCEDURE — 2500000001 HC RX 250 WO HCPCS SELF ADMINISTERED DRUGS (ALT 637 FOR MEDICARE OP): Performed by: NURSE PRACTITIONER

## 2024-12-31 PROCEDURE — 99223 1ST HOSP IP/OBS HIGH 75: CPT | Performed by: NURSE PRACTITIONER

## 2024-12-31 PROCEDURE — 2060000001 HC INTERMEDIATE ICU ROOM DAILY

## 2024-12-31 PROCEDURE — 71275 CT ANGIOGRAPHY CHEST: CPT | Mod: FOREIGN READ | Performed by: RADIOLOGY

## 2024-12-31 PROCEDURE — 2500000002 HC RX 250 W HCPCS SELF ADMINISTERED DRUGS (ALT 637 FOR MEDICARE OP, ALT 636 FOR OP/ED): Performed by: NURSE PRACTITIONER

## 2024-12-31 PROCEDURE — 2500000005 HC RX 250 GENERAL PHARMACY W/O HCPCS: Performed by: STUDENT IN AN ORGANIZED HEALTH CARE EDUCATION/TRAINING PROGRAM

## 2024-12-31 PROCEDURE — 2550000001 HC RX 255 CONTRASTS: Performed by: STUDENT IN AN ORGANIZED HEALTH CARE EDUCATION/TRAINING PROGRAM

## 2024-12-31 PROCEDURE — 71275 CT ANGIOGRAPHY CHEST: CPT

## 2024-12-31 PROCEDURE — 2500000004 HC RX 250 GENERAL PHARMACY W/ HCPCS (ALT 636 FOR OP/ED): Performed by: NURSE PRACTITIONER

## 2024-12-31 PROCEDURE — 82947 ASSAY GLUCOSE BLOOD QUANT: CPT

## 2024-12-31 PROCEDURE — 2500000002 HC RX 250 W HCPCS SELF ADMINISTERED DRUGS (ALT 637 FOR MEDICARE OP, ALT 636 FOR OP/ED): Performed by: STUDENT IN AN ORGANIZED HEALTH CARE EDUCATION/TRAINING PROGRAM

## 2024-12-31 RX ORDER — VALACYCLOVIR HYDROCHLORIDE 500 MG/1
500 TABLET, FILM COATED ORAL 3 TIMES DAILY PRN
Status: DISCONTINUED | OUTPATIENT
Start: 2024-12-31 | End: 2025-01-06 | Stop reason: HOSPADM

## 2024-12-31 RX ORDER — BENZONATATE 100 MG/1
100 CAPSULE ORAL 3 TIMES DAILY PRN
Status: DISCONTINUED | OUTPATIENT
Start: 2024-12-31 | End: 2025-01-06 | Stop reason: HOSPADM

## 2024-12-31 RX ORDER — OXYBUTYNIN CHLORIDE 5 MG/1
5 TABLET ORAL 2 TIMES DAILY
Status: DISCONTINUED | OUTPATIENT
Start: 2024-12-31 | End: 2025-01-06 | Stop reason: HOSPADM

## 2024-12-31 RX ORDER — ROSUVASTATIN CALCIUM 10 MG/1
40 TABLET, COATED ORAL DAILY
Status: DISCONTINUED | OUTPATIENT
Start: 2024-12-31 | End: 2025-01-06 | Stop reason: HOSPADM

## 2024-12-31 RX ORDER — GUAIFENESIN 600 MG/1
600 TABLET, EXTENDED RELEASE ORAL 2 TIMES DAILY
Status: DISCONTINUED | OUTPATIENT
Start: 2024-12-31 | End: 2025-01-04

## 2024-12-31 RX ORDER — LEVOTHYROXINE SODIUM 150 UG/1
150 TABLET ORAL DAILY
Status: DISCONTINUED | OUTPATIENT
Start: 2024-12-31 | End: 2025-01-06 | Stop reason: HOSPADM

## 2024-12-31 RX ORDER — DEXTROSE 50 % IN WATER (D50W) INTRAVENOUS SYRINGE
25
Status: DISCONTINUED | OUTPATIENT
Start: 2024-12-31 | End: 2025-01-06 | Stop reason: HOSPADM

## 2024-12-31 RX ORDER — HEPARIN SODIUM 5000 [USP'U]/ML
7500 INJECTION, SOLUTION INTRAVENOUS; SUBCUTANEOUS EVERY 8 HOURS SCHEDULED
Status: DISCONTINUED | OUTPATIENT
Start: 2024-12-31 | End: 2025-01-06 | Stop reason: HOSPADM

## 2024-12-31 RX ORDER — DEXTROSE 50 % IN WATER (D50W) INTRAVENOUS SYRINGE
12.5
Status: DISCONTINUED | OUTPATIENT
Start: 2024-12-31 | End: 2025-01-06 | Stop reason: HOSPADM

## 2024-12-31 RX ORDER — FLUTICASONE FUROATE AND VILANTEROL 100; 25 UG/1; UG/1
1 POWDER RESPIRATORY (INHALATION)
Status: DISCONTINUED | OUTPATIENT
Start: 2024-12-31 | End: 2025-01-06 | Stop reason: HOSPADM

## 2024-12-31 RX ORDER — CYCLOBENZAPRINE HCL 10 MG
10 TABLET ORAL 3 TIMES DAILY PRN
Status: DISCONTINUED | OUTPATIENT
Start: 2024-12-31 | End: 2025-01-06 | Stop reason: HOSPADM

## 2024-12-31 RX ORDER — FUROSEMIDE 20 MG/1
20 TABLET ORAL DAILY PRN
Status: DISCONTINUED | OUTPATIENT
Start: 2024-12-31 | End: 2025-01-04

## 2024-12-31 RX ORDER — LISINOPRIL 5 MG/1
5 TABLET ORAL DAILY
Status: DISCONTINUED | OUTPATIENT
Start: 2024-12-31 | End: 2025-01-06 | Stop reason: HOSPADM

## 2024-12-31 RX ORDER — AMMONIUM LACTATE 12 G/100G
LOTION TOPICAL AS NEEDED
Status: DISCONTINUED | OUTPATIENT
Start: 2024-12-31 | End: 2025-01-06 | Stop reason: HOSPADM

## 2024-12-31 RX ORDER — INSULIN LISPRO 100 [IU]/ML
0-10 INJECTION, SOLUTION INTRAVENOUS; SUBCUTANEOUS
Status: DISCONTINUED | OUTPATIENT
Start: 2024-12-31 | End: 2025-01-06 | Stop reason: HOSPADM

## 2024-12-31 RX ORDER — DULOXETIN HYDROCHLORIDE 30 MG/1
30 CAPSULE, DELAYED RELEASE ORAL NIGHTLY
Status: DISCONTINUED | OUTPATIENT
Start: 2024-12-31 | End: 2025-01-06 | Stop reason: HOSPADM

## 2024-12-31 RX ORDER — QUETIAPINE FUMARATE 25 MG/1
50 TABLET, FILM COATED ORAL NIGHTLY
Status: DISCONTINUED | OUTPATIENT
Start: 2024-12-31 | End: 2025-01-06 | Stop reason: HOSPADM

## 2024-12-31 RX ORDER — PANTOPRAZOLE SODIUM 40 MG/1
40 TABLET, DELAYED RELEASE ORAL
Status: DISCONTINUED | OUTPATIENT
Start: 2024-12-31 | End: 2025-01-06 | Stop reason: HOSPADM

## 2024-12-31 RX ADMIN — OXYBUTYNIN CHLORIDE 5 MG: 5 TABLET ORAL at 21:31

## 2024-12-31 RX ADMIN — TIOTROPIUM BROMIDE INHALATION SPRAY 2 PUFF: 3.12 SPRAY, METERED RESPIRATORY (INHALATION) at 08:37

## 2024-12-31 RX ADMIN — HEPARIN SODIUM 7500 UNITS: 5000 INJECTION INTRAVENOUS; SUBCUTANEOUS at 13:15

## 2024-12-31 RX ADMIN — LEVOTHYROXINE SODIUM 150 MCG: 0.15 TABLET ORAL at 05:56

## 2024-12-31 RX ADMIN — Medication 30 L/MIN: at 20:03

## 2024-12-31 RX ADMIN — Medication 30 L/MIN: at 08:10

## 2024-12-31 RX ADMIN — INSULIN LISPRO 6 UNITS: 100 INJECTION, SOLUTION INTRAVENOUS; SUBCUTANEOUS at 16:22

## 2024-12-31 RX ADMIN — LISINOPRIL 5 MG: 5 TABLET ORAL at 08:09

## 2024-12-31 RX ADMIN — FLUTICASONE FUROATE AND VILANTEROL TRIFENATATE 1 PUFF: 100; 25 POWDER RESPIRATORY (INHALATION) at 08:36

## 2024-12-31 RX ADMIN — PANTOPRAZOLE SODIUM 40 MG: 40 TABLET, DELAYED RELEASE ORAL at 05:56

## 2024-12-31 RX ADMIN — METHYLPREDNISOLONE SODIUM SUCCINATE 40 MG: 40 INJECTION, POWDER, FOR SOLUTION INTRAMUSCULAR; INTRAVENOUS at 16:22

## 2024-12-31 RX ADMIN — INSULIN LISPRO 6 UNITS: 100 INJECTION, SOLUTION INTRAVENOUS; SUBCUTANEOUS at 13:13

## 2024-12-31 RX ADMIN — IOHEXOL 80 ML: 350 INJECTION, SOLUTION INTRAVENOUS at 02:28

## 2024-12-31 RX ADMIN — METHYLPREDNISOLONE SODIUM SUCCINATE 40 MG: 40 INJECTION, POWDER, FOR SOLUTION INTRAMUSCULAR; INTRAVENOUS at 08:09

## 2024-12-31 RX ADMIN — GUAIFENESIN 600 MG: 600 TABLET, EXTENDED RELEASE ORAL at 21:30

## 2024-12-31 RX ADMIN — OXYBUTYNIN CHLORIDE 5 MG: 5 TABLET ORAL at 08:20

## 2024-12-31 RX ADMIN — ROSUVASTATIN CALCIUM 40 MG: 10 TABLET, FILM COATED ORAL at 08:20

## 2024-12-31 RX ADMIN — HEPARIN SODIUM 7500 UNITS: 5000 INJECTION INTRAVENOUS; SUBCUTANEOUS at 21:31

## 2024-12-31 RX ADMIN — IPRATROPIUM BROMIDE AND ALBUTEROL SULFATE 3 ML: .5; 3 SOLUTION RESPIRATORY (INHALATION) at 14:04

## 2024-12-31 RX ADMIN — HEPARIN SODIUM 7500 UNITS: 5000 INJECTION INTRAVENOUS; SUBCUTANEOUS at 05:56

## 2024-12-31 RX ADMIN — INSULIN LISPRO 6 UNITS: 100 INJECTION, SOLUTION INTRAVENOUS; SUBCUTANEOUS at 08:18

## 2024-12-31 RX ADMIN — GUAIFENESIN 600 MG: 600 TABLET, EXTENDED RELEASE ORAL at 08:09

## 2024-12-31 SDOH — ECONOMIC STABILITY: INCOME INSECURITY: IN THE PAST 12 MONTHS HAS THE ELECTRIC, GAS, OIL, OR WATER COMPANY THREATENED TO SHUT OFF SERVICES IN YOUR HOME?: NO

## 2024-12-31 SDOH — SOCIAL STABILITY: SOCIAL INSECURITY: HAVE YOU HAD THOUGHTS OF HARMING ANYONE ELSE?: NO

## 2024-12-31 SDOH — SOCIAL STABILITY: SOCIAL INSECURITY: WITHIN THE LAST YEAR, HAVE YOU BEEN HUMILIATED OR EMOTIONALLY ABUSED IN OTHER WAYS BY YOUR PARTNER OR EX-PARTNER?: NO

## 2024-12-31 SDOH — SOCIAL STABILITY: SOCIAL INSECURITY: ARE THERE ANY APPARENT SIGNS OF INJURIES/BEHAVIORS THAT COULD BE RELATED TO ABUSE/NEGLECT?: NO

## 2024-12-31 SDOH — ECONOMIC STABILITY: HOUSING INSECURITY: IN THE LAST 12 MONTHS, WAS THERE A TIME WHEN YOU WERE NOT ABLE TO PAY THE MORTGAGE OR RENT ON TIME?: NO

## 2024-12-31 SDOH — ECONOMIC STABILITY: FOOD INSECURITY: WITHIN THE PAST 12 MONTHS, YOU WORRIED THAT YOUR FOOD WOULD RUN OUT BEFORE YOU GOT THE MONEY TO BUY MORE.: NEVER TRUE

## 2024-12-31 SDOH — SOCIAL STABILITY: SOCIAL INSECURITY: WITHIN THE LAST YEAR, HAVE YOU BEEN AFRAID OF YOUR PARTNER OR EX-PARTNER?: NO

## 2024-12-31 SDOH — SOCIAL STABILITY: SOCIAL INSECURITY: HAVE YOU HAD ANY THOUGHTS OF HARMING ANYONE ELSE?: NO

## 2024-12-31 SDOH — SOCIAL STABILITY: SOCIAL INSECURITY: ARE YOU OR HAVE YOU BEEN THREATENED OR ABUSED PHYSICALLY, EMOTIONALLY, OR SEXUALLY BY ANYONE?: NO

## 2024-12-31 SDOH — SOCIAL STABILITY: SOCIAL INSECURITY: DO YOU FEEL ANYONE HAS EXPLOITED OR TAKEN ADVANTAGE OF YOU FINANCIALLY OR OF YOUR PERSONAL PROPERTY?: NO

## 2024-12-31 SDOH — SOCIAL STABILITY: SOCIAL INSECURITY
WITHIN THE LAST YEAR, HAVE YOU BEEN KICKED, HIT, SLAPPED, OR OTHERWISE PHYSICALLY HURT BY YOUR PARTNER OR EX-PARTNER?: NO

## 2024-12-31 SDOH — SOCIAL STABILITY: SOCIAL INSECURITY: DOES ANYONE TRY TO KEEP YOU FROM HAVING/CONTACTING OTHER FRIENDS OR DOING THINGS OUTSIDE YOUR HOME?: NO

## 2024-12-31 SDOH — SOCIAL STABILITY: SOCIAL INSECURITY
WITHIN THE LAST YEAR, HAVE YOU BEEN RAPED OR FORCED TO HAVE ANY KIND OF SEXUAL ACTIVITY BY YOUR PARTNER OR EX-PARTNER?: NO

## 2024-12-31 SDOH — ECONOMIC STABILITY: HOUSING INSECURITY: AT ANY TIME IN THE PAST 12 MONTHS, WERE YOU HOMELESS OR LIVING IN A SHELTER (INCLUDING NOW)?: NO

## 2024-12-31 SDOH — ECONOMIC STABILITY: FOOD INSECURITY: WITHIN THE PAST 12 MONTHS, THE FOOD YOU BOUGHT JUST DIDN'T LAST AND YOU DIDN'T HAVE MONEY TO GET MORE.: NEVER TRUE

## 2024-12-31 SDOH — SOCIAL STABILITY: SOCIAL INSECURITY: ABUSE: ADULT

## 2024-12-31 SDOH — ECONOMIC STABILITY: FOOD INSECURITY: HOW HARD IS IT FOR YOU TO PAY FOR THE VERY BASICS LIKE FOOD, HOUSING, MEDICAL CARE, AND HEATING?: NOT VERY HARD

## 2024-12-31 SDOH — ECONOMIC STABILITY: TRANSPORTATION INSECURITY: IN THE PAST 12 MONTHS, HAS LACK OF TRANSPORTATION KEPT YOU FROM MEDICAL APPOINTMENTS OR FROM GETTING MEDICATIONS?: NO

## 2024-12-31 SDOH — SOCIAL STABILITY: SOCIAL INSECURITY: WERE YOU ABLE TO COMPLETE ALL THE BEHAVIORAL HEALTH SCREENINGS?: YES

## 2024-12-31 SDOH — SOCIAL STABILITY: SOCIAL INSECURITY: HAS ANYONE EVER THREATENED TO HURT YOUR FAMILY OR YOUR PETS?: NO

## 2024-12-31 SDOH — ECONOMIC STABILITY: HOUSING INSECURITY: IN THE PAST 12 MONTHS, HOW MANY TIMES HAVE YOU MOVED WHERE YOU WERE LIVING?: 1

## 2024-12-31 SDOH — SOCIAL STABILITY: SOCIAL INSECURITY: DO YOU FEEL UNSAFE GOING BACK TO THE PLACE WHERE YOU ARE LIVING?: NO

## 2024-12-31 ASSESSMENT — LIFESTYLE VARIABLES
AUDIT-C TOTAL SCORE: 0
SKIP TO QUESTIONS 9-10: 1
HOW MANY STANDARD DRINKS CONTAINING ALCOHOL DO YOU HAVE ON A TYPICAL DAY: PATIENT DOES NOT DRINK
PRESCIPTION_ABUSE_PAST_12_MONTHS: NO
HOW OFTEN DO YOU HAVE A DRINK CONTAINING ALCOHOL: NEVER
HOW OFTEN DO YOU HAVE 6 OR MORE DRINKS ON ONE OCCASION: NEVER
AUDIT-C TOTAL SCORE: 0
SUBSTANCE_ABUSE_PAST_12_MONTHS: NO

## 2024-12-31 ASSESSMENT — ACTIVITIES OF DAILY LIVING (ADL)
HEARING - LEFT EAR: FUNCTIONAL
WALKS IN HOME: NEEDS ASSISTANCE
FEEDING YOURSELF: INDEPENDENT
BATHING: NEEDS ASSISTANCE
LACK_OF_TRANSPORTATION: NO
LACK_OF_TRANSPORTATION: NO
DRESSING YOURSELF: INDEPENDENT
JUDGMENT_ADEQUATE_SAFELY_COMPLETE_DAILY_ACTIVITIES: YES
TOILETING: NEEDS ASSISTANCE
GROOMING: INDEPENDENT
PATIENT'S MEMORY ADEQUATE TO SAFELY COMPLETE DAILY ACTIVITIES?: YES
HEARING - RIGHT EAR: FUNCTIONAL
ADEQUATE_TO_COMPLETE_ADL: YES

## 2024-12-31 ASSESSMENT — COGNITIVE AND FUNCTIONAL STATUS - GENERAL
PATIENT BASELINE BEDBOUND: NO
MOVING TO AND FROM BED TO CHAIR: A LITTLE
HELP NEEDED FOR BATHING: A LITTLE
WALKING IN HOSPITAL ROOM: A LITTLE
MOBILITY SCORE: 20
TOILETING: A LITTLE
CLIMB 3 TO 5 STEPS WITH RAILING: A LITTLE
DRESSING REGULAR LOWER BODY CLOTHING: A LITTLE
DAILY ACTIVITIY SCORE: 21
STANDING UP FROM CHAIR USING ARMS: A LITTLE

## 2024-12-31 ASSESSMENT — COLUMBIA-SUICIDE SEVERITY RATING SCALE - C-SSRS
1. IN THE PAST MONTH, HAVE YOU WISHED YOU WERE DEAD OR WISHED YOU COULD GO TO SLEEP AND NOT WAKE UP?: NO
6. HAVE YOU EVER DONE ANYTHING, STARTED TO DO ANYTHING, OR PREPARED TO DO ANYTHING TO END YOUR LIFE?: NO
2. HAVE YOU ACTUALLY HAD ANY THOUGHTS OF KILLING YOURSELF?: NO

## 2024-12-31 ASSESSMENT — PAIN SCALES - GENERAL: PAINLEVEL_OUTOF10: 0 - NO PAIN

## 2024-12-31 ASSESSMENT — PATIENT HEALTH QUESTIONNAIRE - PHQ9
2. FEELING DOWN, DEPRESSED OR HOPELESS: NOT AT ALL
SUM OF ALL RESPONSES TO PHQ9 QUESTIONS 1 & 2: 0
1. LITTLE INTEREST OR PLEASURE IN DOING THINGS: NOT AT ALL

## 2024-12-31 NOTE — H&P
History Of Present Illness  Danelle Jacobs is a 64 y.o. female presenting to the emergency department for evaluation of cough and congestion.  Patient went to urgent care yesterday afternoon at approximately 3 PM and was found to have a low pulse ox of 79%.  Patient was sent by urgent care to emergency department for further evaluation.  Patient admits to history of COPD and states she is not normally on oxygen.  Patient states that she typically uses her nebulizer at home but has been unable to since she has been without power since Thursday.  Patient admits to a productive cough with yellow, greenish sputum and notes sinus drainage, runny nose, congestion, and intermittent shortness of breath that is worse with exertion.  Patient denies chest pain or dizziness.  Patient denies nausea, vomiting, diarrhea, fever, chills, abdominal, or urinary symptoms.  Patient was placed on high flow oxygen after she was moved to a room in the emergency department.    In ED, glucose 103, bicarb 33, hematocrit 46.7.  Magnesium, BNP, troponin all within normal limits.  Flu, COVID negative.  RSV ordered and pending.  Patient remains on high flow oxygen.  Blood pressure 139/64, heart rate 81, respirations 18, temperature 37.2 °C, SpO2 97%.  Patient given Solu-Medrol 125 mg IV, DuoNeb treatment in ED.  RT evaluation completed and DuoNeb treatments ordered every 2 hours.  CT angio of the chest completed showing chronic changes, COPD and emphysema, no acute process, lung nodule (subpleural) noted in the right lower lobe with recommendation of an outpatient nonemergent PET scan 2/2 findings of prominent interstitial/hilar lymph nodes per radiology review.  Chest x-ray completed and negative for acute process per radiology review.  Patient is a former smoker who quit in 2018.  Patient will be placed on SDU and admitted to Dr. Abreu who will continue to follow.  I was asked to do H&P and place initial admission orders.  PCP   "Judy.    *Prior medical records reviewed     Past Medical History  COPD, diabetes on Mounjaro, hypothyroidism, insomnia, hyperlipidemia, recurrent cold sores, right lower extremity arterial insufficiency, chronic low back pain    Surgical History  Appendectomy, hernia repair, cholecystectomy, foot surgery, lung biopsy     Social History  Former smoker (quit in 2018), no drug use, no alcohol use  Family History  CVA, Alzheimer's     Allergies  NKDA/NKA    Review of Systems  A 10 point review of systems was completed and negative except what is listed in HPI  Physical Exam  Constitutional:       Appearance: She is obese.   HENT:      Mouth/Throat:      Mouth: Mucous membranes are dry.      Pharynx: Oropharynx is clear.   Eyes:      Pupils: Pupils are equal, round, and reactive to light.   Cardiovascular:      Rate and Rhythm: Normal rate and regular rhythm.   Pulmonary:      Breath sounds: Decreased breath sounds present.   Abdominal:      General: Bowel sounds are normal.   Musculoskeletal:      Cervical back: Normal range of motion.      Comments: Mild edema, nonpitting, to right lower extremity   Skin:     General: Skin is warm and dry.      Capillary Refill: Capillary refill takes less than 2 seconds.   Neurological:      General: No focal deficit present.      Mental Status: She is alert and oriented to person, place, and time.   Psychiatric:         Mood and Affect: Mood normal.          Last Recorded Vitals  Blood pressure 141/68, pulse 79, temperature 36.8 °C (98.2 °F), temperature source Temporal, resp. rate 18, height 1.702 m (5' 7\"), weight 120 kg (265 lb), SpO2 94%.    Relevant Results  CT angio chest for pulmonary embolism    Result Date: 12/31/2024  STUDY: CT Angiogram of the Chest; 12/31/2024 2:29 AM INDICATION: Hypoxia.  Tachycardia. COMPARISON: CXR 12/30/2024. ACCESSION NUMBER(S): [id number] ORDERING CLINICIAN: JO VAZQUEZ TECHNIQUE:  CTA of the chest was performed with intravenous " contrast. Images are reviewed and processed at a workstation according to the CT angiogram protocol with 3-D and/or MIP post processing imaging generated.  Omnipaque 350 80 mL was administered intravenously. Automated mA/kV exposure control was utilized and patient examination was performed in strict accordance with principles of ALARA. FINDINGS: Image quality is limited due to the patient's body habitus as well as mild respiratory motion limiting assessment of the smaller pulmonary arteries.  Pulmonary arteries are adequately opacified without acute or chronic filling defects.  The thoracic aorta is normal in course and caliber without dissection or aneurysm. The heart is markedly enlarged in size without pericardial effusion. Slightly prominent but nonpathologically enlarged mediastinal and bilateral hilar lymph nodes are identified. Trachea and mainstem bronchi are patent and clear of debris.  Pleural parenchymal scarring is noted along the right upper lobe.  There is a subpleural soft tissue density in the lateral basilar segment of the right lower lobe measuring 2.1 x 1.1 cm in diameter on image 172 of series 406 which may represent rounded atelectasis however neoplasm is not entirely excluded.  Atelectasis is seen at the left lung base. There is mild bilateral lower lobe bronchial wall thickening along with mild bronchial wall thickening in the right upper lobe.  Minimal centrilobular emphysema is seen in the bilateral upper lobes. Upper abdomen demonstrates no acute pathology. There are no acute fractures.  No suspicious bony lesions. Generalized osseous demineralization is noted with a slightly pronounced thoracic kyphosis.    Chronic changes suggesting COPD and mild emphysema with bronchial wall thickening in the bilateral lower lobes and right upper lobe which may indicate acute or chronic bronchitis. Subpleural area of nodularity in the lateral aspect of the right lower lobe measuring up to 2.1 cm which  may represent rounded atelectasis however neoplasm is not entirely excluded.  Further evaluation is recommended with nonemergent PET/CT. Right upper lobe pleural parenchymal scarring. Prominent but nonpathologically enlarged mediastinal and bilateral hilar lymph nodes, most likely reactive however these can also be better assessed on PET/CT. Pronounced cardiomegaly. Signed by Luis Alberto Rosario    XR chest 2 views    Result Date: 12/30/2024  STUDY: Chest Radiographs;  12/20/2024, 4:02PM INDICATION: Shortness of breath and hypoxia. COMPARISON: None Available ACCESSION NUMBER(S): [id number] ORDERING CLINICIAN: JO VAZQUEZ TECHNIQUE:  Frontal and lateral chest. FINDINGS: Heart is top normal size. There is central vascular prominence without vascular congestion. No infiltrate is seen. There is minimal right costophrenic angle blunting, without significant pleural effusion on lateral view. There is no pneumothorax. The bony thorax reveals minimal thoracic spine arthritis and moderate kyphosis.    No detectable active cardiopulmonary disease. Remainder as above. Signed by Elton Montero MD   Results for orders placed or performed during the hospital encounter of 12/30/24 (from the past 24 hours)   Influenza A, and B PCR   Result Value Ref Range    Flu A Result Not Detected Not Detected    Flu B Result Not Detected Not Detected   Sars-CoV-2 PCR   Result Value Ref Range    Coronavirus 2019, PCR Not Detected Not Detected   RSV PCR   Result Value Ref Range    RSV PCR Not Detected Not Detected   CBC and Auto Differential   Result Value Ref Range    WBC 10.1 4.4 - 11.3 x10*3/uL    nRBC 0.0 0.0 - 0.0 /100 WBCs    RBC 5.23 (H) 4.00 - 5.20 x10*6/uL    Hemoglobin 14.3 12.0 - 16.0 g/dL    Hematocrit 46.7 (H) 36.0 - 46.0 %    MCV 89 80 - 100 fL    MCH 27.3 26.0 - 34.0 pg    MCHC 30.6 (L) 32.0 - 36.0 g/dL    RDW 16.4 (H) 11.5 - 14.5 %    Platelets 266 150 - 450 x10*3/uL    Neutrophils % 66.3 40.0 - 80.0 %    Immature Granulocytes %,  Automated 0.2 0.0 - 0.9 %    Lymphocytes % 25.7 13.0 - 44.0 %    Monocytes % 5.3 2.0 - 10.0 %    Eosinophils % 1.8 0.0 - 6.0 %    Basophils % 0.7 0.0 - 2.0 %    Neutrophils Absolute 6.67 1.20 - 7.70 x10*3/uL    Immature Granulocytes Absolute, Automated 0.02 0.00 - 0.70 x10*3/uL    Lymphocytes Absolute 2.59 1.20 - 4.80 x10*3/uL    Monocytes Absolute 0.53 0.10 - 1.00 x10*3/uL    Eosinophils Absolute 0.18 0.00 - 0.70 x10*3/uL    Basophils Absolute 0.07 0.00 - 0.10 x10*3/uL   B-Type Natriuretic Peptide   Result Value Ref Range    BNP 24 0 - 99 pg/mL   Troponin I, High Sensitivity, Initial   Result Value Ref Range    Troponin I, High Sensitivity 7 0 - 13 ng/L   Blood Gas Venous Full Panel   Result Value Ref Range    POCT pH, Venous 7.40 7.33 - 7.43 pH    POCT pCO2, Venous 66 (H) 41 - 51 mm Hg    POCT pO2, Venous 58 (H) 35 - 45 mm Hg    POCT SO2, Venous 90 (H) 45 - 75 %    POCT Oxy Hemoglobin, Venous 86.9 (H) 45.0 - 75.0 %    POCT Hematocrit Calculated, Venous 44.0 36.0 - 46.0 %    POCT Sodium, Venous 137 136 - 145 mmol/L    POCT Potassium, Venous 4.3 3.5 - 5.3 mmol/L    POCT Chloride, Venous 101 98 - 107 mmol/L    POCT Ionized Calicum, Venous 1.37 (H) 1.10 - 1.33 mmol/L    POCT Glucose, Venous 132 (H) 74 - 99 mg/dL    POCT Lactate, Venous 1.5 0.4 - 2.0 mmol/L    POCT Base Excess, Venous 12.8 (H) -2.0 - 3.0 mmol/L    POCT HCO3 Calculated, Venous 40.9 (H) 22.0 - 26.0 mmol/L    POCT Hemoglobin, Venous 14.7 12.0 - 16.0 g/dL    POCT Anion Gap, Venous -1.0 (L) 10.0 - 25.0 mmol/L    Patient Temperature 37.0 degrees Celsius    FiO2 36 %   Comprehensive metabolic panel   Result Value Ref Range    Glucose 103 (H) 74 - 99 mg/dL    Sodium 139 136 - 145 mmol/L    Potassium 4.0 3.5 - 5.3 mmol/L    Chloride 99 98 - 107 mmol/L    Bicarbonate 33 (H) 21 - 32 mmol/L    Anion Gap 11 10 - 20 mmol/L    Urea Nitrogen 10 6 - 23 mg/dL    Creatinine 0.71 0.50 - 1.05 mg/dL    eGFR >90 >60 mL/min/1.73m*2    Calcium 10.3 8.6 - 10.3 mg/dL    Albumin  3.9 3.4 - 5.0 g/dL    Alkaline Phosphatase 91 33 - 136 U/L    Total Protein 7.6 6.4 - 8.2 g/dL    AST 14 9 - 39 U/L    Bilirubin, Total 0.4 0.0 - 1.2 mg/dL    ALT 13 7 - 45 U/L   Magnesium   Result Value Ref Range    Magnesium 2.24 1.60 - 2.40 mg/dL   Troponin, High Sensitivity, 1 Hour   Result Value Ref Range    Troponin I, High Sensitivity 7 0 - 13 ng/L       Assessment/Plan   Danelle is a 64-year-old female patient presenting to emergency department for evaluation of cough and congestion.  Patient states that she has a history of COPD and is not on home oxygen, usually uses her nebulizer however her power has been out at home since Thursday.  Patient went to urgent care for evaluation and was found to be hypoxic with an SpO2 of 79%.  Patient was sent to ED for further evaluation.  Patient was placed on high flow oxygen in ED, imaging completed showing chronic changes, negative acute however pulmonary and hilar lymph nodes were found and a PET scan, nonemergent is recommended.  Patient was treated with IV Solu-Medrol and DuoNeb treatments, remains on high flow oxygen, admitted for further medical management.    COPD exacerbation/acute respiratory failure  Inpatient telemetry SDU admission to Dr. Abreu  See imaging results above   Continue high flow oxygen  Nebulizer treatments every 2 hours per RT  Continue Solu-Medrol 40 mg IV push every 8 hours  Mucinex/Tessalon  Telemetry monitoring  COPD navigator    HTN/diabetes/COPD/hypothyroidism/insomnia/HLD/recurrent cold sores/chronic low back pain/right lower extremity insufficiency  #Chronic conditions  Continue home medications as ordered  Diabetic/cardiac diet  Hold home oral antidiabetic medication  Insulin sliding scale  Hypoglycemia protocol    DVT Ppx  SCDs  Heparin subcutaneous  Out of bed with assistance        I spent 45 minutes in the professional and overall care of this patient.  Micaela Lou, APRN-CNP

## 2024-12-31 NOTE — ED PROCEDURE NOTE
Procedure  Critical Care    Performed by: Zaid Dhillon DO  Authorized by: Zaid Dhillon DO    Critical care provider statement:     Critical care time (minutes):  42    Critical care time was exclusive of:  Separately billable procedures and treating other patients and teaching time    Critical care was necessary to treat or prevent imminent or life-threatening deterioration of the following conditions:  Respiratory failure    Critical care was time spent personally by me on the following activities:  Ordering and performing treatments and interventions, ordering and review of laboratory studies, ordering and review of radiographic studies, pulse oximetry, re-evaluation of patient's condition, review of old charts, examination of patient and evaluation of patient's response to treatment    Care discussed with: admitting provider                 Zaid Dhillon DO  12/31/24 1607       Zaid Dhillon DO  12/31/24 1607

## 2024-12-31 NOTE — ED PROVIDER NOTES
EMERGENCY DEPARTMENT ENCOUNTER      Pt Name: Danelle Jacobs  MRN: [id number]  Birthdate 1960  Date of evaluation: 12/30/2024  Provider: Zaid Dhillon DO    CHIEF COMPLAINT       Chief Complaint   Patient presents with    Cough     PT. C/O COUGH, CONGESTION STARTING ABOUT 12/24, SENT BY URGENT CARE FOR LOW PULSE OX. PT. O2 SAT. 79% UPON WALKING INTO TRIAGE, INCREASED TO 89% AFTER RESTING. PT. PLACED ON 2L PER NC IN TRIAGE, SAT. INCREASED TO 94%. PT. DENIES SOB.         HISTORY OF PRESENT ILLNESS    HPI  Patient 64-year-old female with history of COPD not normally on oxygen, DM2, HTN, HLD, hypothyroidism presenting with cough.  This been ongoing since the 24th.  Cough productive of yellow-green sputum, she notes sinus drainage, runny nose, congestion, intermittent shortness of breath particular worse with exertion.  She has been without power since Thursday and has been unable to use her nebulizer.  She went to an urgent care, was found to saturate 79% while ambulating with significant respiratory distress, sent to the ED for evaluation she denies chest pain, abdominal pain, nausea, vomiting, diarrhea, constipation, dark or bloody stools, dysuria, hematuria.    Nursing Notes were reviewed.    PAST MEDICAL HISTORY     Past Medical History:   Diagnosis Date    Bronchopneumonia, unspecified organism     Bronchial pneumonia    Cellulitis of right lower limb     Cellulitis of right leg    COPD (chronic obstructive pulmonary disease) (Multi)     Diabetes mellitus (Multi)     Disease of thyroid gland     Fatty (change of) liver, not elsewhere classified     Fatty infiltration of liver    Hypertension     Localized edema 03/04/2015    Leg edema    Other long term (current) drug therapy     Long-term use of high-risk medication    Other malaise     Malaise and fatigue    Otitis media, unspecified, left ear     Left otitis media    Personal history of diseases of the skin and subcutaneous tissue     History of actinic  keratosis    Personal history of other diseases of the circulatory system     History of rheumatic fever    Personal history of other diseases of the female genital tract     History of ovarian cyst    Personal history of other diseases of the nervous system and sense organs     History of sciatica    Personal history of other diseases of the nervous system and sense organs     History of carpal tunnel syndrome    Personal history of other diseases of the respiratory system     History of bronchitis    Personal history of other diseases of the respiratory system     History of sinusitis    Personal history of other diseases of the respiratory system     History of chronic obstructive lung disease    Personal history of other endocrine, nutritional and metabolic disease     History of hypothyroidism    Personal history of other endocrine, nutritional and metabolic disease     History of hyperlipidemia    Personal history of other infectious and parasitic diseases     History of herpes labialis    Personal history of other mental and behavioral disorders     History of depression         SURGICAL HISTORY       Past Surgical History:   Procedure Laterality Date    APPENDECTOMY  05/07/2015    Appendectomy    CHOLECYSTECTOMY  12/31/2015    Cholecystectomy    FOOT SURGERY  12/31/2015    Foot Surgery    GALLBLADDER SURGERY  03/04/2015    Gallbladder Surgery    HERNIA REPAIR  03/04/2015    Hernia Repair    OTHER SURGICAL HISTORY  03/04/2015    Arthrodesis 1st Intermetatarsal Base Right Foot    OTHER SURGICAL HISTORY  03/04/2015    Biopsy Lung Percutaneous         CURRENT MEDICATIONS       Previous Medications    AMMONIUM LACTATE (LAC-HYDRIN) 12 % LOTION    Apply topically if needed for dry skin. Apply to dry and rough skin area (avoid face)    BLOOD SUGAR DIAGNOSTIC (BLOOD GLUCOSE TEST) STRIP    Test once to twice daily    BLOOD-GLUCOSE METER MISC    Test once to twice daily    CYCLOBENZAPRINE (FLEXERIL) 10 MG TABLET     Take 1 tablet (10 mg) by mouth 3 times a day as needed for muscle spasms.    DULOXETINE (CYMBALTA) 30 MG DR CAPSULE    Take 1 capsule (30 mg) by mouth once daily at bedtime.    FLUTICASONE-UMECLIDIN-VILANTER (TRELEGY-ELLIPTA) 100-62.5-25 MCG BLISTER WITH DEVICE    Inhale 1 puff once daily.    FUROSEMIDE (LASIX) 20 MG TABLET    Take 1 tablet (20 mg) by mouth once daily as needed (swelling).    IPRATROPIUM-ALBUTEROL (COMBIVENT RESPIMAT)  MCG/ACTUATION INHALER    Inhale 1 puff 4 times a day.    IPRATROPIUM-ALBUTEROL (DUO-NEB) 0.5-2.5 MG/3 ML NEBULIZER SOLUTION    Take 3 mL by nebulization every 4 hours if needed for wheezing.    LANCETS MISC    Test once daily as directed    LEVOTHYROXINE (SYNTHROID, LEVOXYL) 150 MCG TABLET    Take 1 tablet (150 mcg) by mouth early in the morning..    LISINOPRIL 5 MG TABLET    TAKE 1 TABLET DAILY    METFORMIN (GLUCOPHAGE) 500 MG TABLET    Take 1 tablet (500 mg) by mouth 2 times a day.    OMEPRAZOLE (PRILOSEC) 40 MG DR CAPSULE    Take 1 capsule (40 mg) by mouth once daily in the morning. Take before meals.    OXYBUTYNIN XL (DITROPAN-XL) 15 MG 24 HR TABLET    Take 1 tablet (15 mg) by mouth early in the morning..    PHENOBARBITAL-HYOSCYAMINE-ATROPINE-SCOPOLOAMINE (DONNATAL) 16.2-0.1037 -0.0194 MG TABLET    Take 1-2 tablets by mouth 3 times a day.    QUETIAPINE (SEROQUEL) 50 MG TABLET    Take 1 tablet (50 mg) by mouth once daily at bedtime.    ROSUVASTATIN (CRESTOR) 40 MG TABLET    Take 1 tablet (40 mg) by mouth once daily.    TIRZEPATIDE (MOUNJARO) 7.5 MG/0.5 ML PEN INJECTOR    Inject 7.5 mg under the skin 1 (one) time per week.    VALACYCLOVIR (VALTREX) 500 MG TABLET    Take 1 tablet (500 mg) by mouth 3 times a day as needed (cold sores).       ALLERGIES     Patient has no known allergies.    FAMILY HISTORY       Family History   Problem Relation Name Age of Onset    Other (alzheimers dementia) Mother      Other (cerebrovascular accident) Father      Other (htn) Father             SOCIAL HISTORY       Social History     Socioeconomic History    Marital status:    Tobacco Use    Smoking status: Former     Current packs/day: 0.00     Types: Cigarettes     Quit date: 2018     Years since quittin.0    Smokeless tobacco: Never   Vaping Use    Vaping status: Unknown   Substance and Sexual Activity    Alcohol use: Not Currently     Comment: RARE    Drug use: Never     Social Drivers of Health     Financial Resource Strain: Low Risk  (2024)    Overall Financial Resource Strain (CARDIA)     Difficulty of Paying Living Expenses: Not very hard   Food Insecurity: No Food Insecurity (2024)    Hunger Vital Sign     Worried About Running Out of Food in the Last Year: Never true     Ran Out of Food in the Last Year: Never true   Transportation Needs: No Transportation Needs (2024)    PRAPARE - Transportation     Lack of Transportation (Medical): No     Lack of Transportation (Non-Medical): No   Intimate Partner Violence: Not At Risk (2024)    Humiliation, Afraid, Rape, and Kick questionnaire     Fear of Current or Ex-Partner: No     Emotionally Abused: No     Physically Abused: No     Sexually Abused: No   Housing Stability: Low Risk  (2024)    Housing Stability Vital Sign     Unable to Pay for Housing in the Last Year: No     Number of Times Moved in the Last Year: 1     Homeless in the Last Year: No       SCREENINGS                        PHYSICAL EXAM    (up to 7 for level 4, 8 or more for level 5)     ED Triage Vitals [24 1532]   Temperature Heart Rate Respirations BP   36.8 °C (98.2 °F) 67 20 166/72      Pulse Ox Temp Source Heart Rate Source Patient Position   94 % Temporal Monitor Sitting      BP Location FiO2 (%)     Right arm --       Physical Exam  Constitutional:       General: She is not in acute distress.     Appearance: She is obese. She is not toxic-appearing.   HENT:      Head: Normocephalic and atraumatic.      Nose: Congestion and  rhinorrhea present.      Mouth/Throat:      Mouth: Mucous membranes are moist.      Pharynx: Oropharynx is clear.   Eyes:      Extraocular Movements: Extraocular movements intact.      Conjunctiva/sclera: Conjunctivae normal.   Cardiovascular:      Rate and Rhythm: Normal rate and regular rhythm.      Heart sounds: Normal heart sounds.   Pulmonary:      Effort: Pulmonary effort is normal.      Comments: Diminished breath sounds bilaterally, no focal signs  Abdominal:      General: There is no distension.      Palpations: Abdomen is soft.      Tenderness: There is no abdominal tenderness.   Musculoskeletal:         General: No deformity or signs of injury.      Cervical back: Normal range of motion and neck supple.   Skin:     General: Skin is warm and dry.      Capillary Refill: Capillary refill takes less than 2 seconds.   Neurological:      General: No focal deficit present.          DIAGNOSTIC RESULTS     LABS:  Labs Reviewed   CBC WITH AUTO DIFFERENTIAL - Abnormal       Result Value    WBC 10.1      nRBC 0.0      RBC 5.23 (*)     Hemoglobin 14.3      Hematocrit 46.7 (*)     MCV 89      MCH 27.3      MCHC 30.6 (*)     RDW 16.4 (*)     Platelets 266      Neutrophils % 66.3      Immature Granulocytes %, Automated 0.2      Lymphocytes % 25.7      Monocytes % 5.3      Eosinophils % 1.8      Basophils % 0.7      Neutrophils Absolute 6.67      Immature Granulocytes Absolute, Automated 0.02      Lymphocytes Absolute 2.59      Monocytes Absolute 0.53      Eosinophils Absolute 0.18      Basophils Absolute 0.07     COMPREHENSIVE METABOLIC PANEL - Abnormal    Glucose 103 (*)     Sodium 139      Potassium 4.0      Chloride 99      Bicarbonate 33 (*)     Anion Gap 11      Urea Nitrogen 10      Creatinine 0.71      eGFR >90      Calcium 10.3      Albumin 3.9      Alkaline Phosphatase 91      Total Protein 7.6      AST 14      Bilirubin, Total 0.4      ALT 13     BLOOD GAS VENOUS FULL PANEL - Abnormal    POCT pH, Venous 7.40       POCT pCO2, Venous 66 (*)     POCT pO2, Venous 58 (*)     POCT SO2, Venous 90 (*)     POCT Oxy Hemoglobin, Venous 86.9 (*)     POCT Hematocrit Calculated, Venous 44.0      POCT Sodium, Venous 137      POCT Potassium, Venous 4.3      POCT Chloride, Venous 101      POCT Ionized Calicum, Venous 1.37 (*)     POCT Glucose, Venous 132 (*)     POCT Lactate, Venous 1.5      POCT Base Excess, Venous 12.8 (*)     POCT HCO3 Calculated, Venous 40.9 (*)     POCT Hemoglobin, Venous 14.7      POCT Anion Gap, Venous -1.0 (*)     Patient Temperature 37.0      FiO2 36     POCT GLUCOSE - Abnormal    POCT Glucose 253 (*)    POCT GLUCOSE - Abnormal    POCT Glucose 266 (*)    B-TYPE NATRIURETIC PEPTIDE - Normal    BNP 24      Narrative:        <100 pg/mL - Heart failure unlikely  100-299 pg/mL - Intermediate probability of acute heart                  failure exacerbation. Correlate with clinical                  context and patient history.    >=300 pg/mL - Heart Failure likely. Correlate with clinical                  context and patient history.    BNP testing is performed using different testing methodology at [facility] than at [facility]. Direct result comparisons should only be made within the same method.      INFLUENZA A AND B PCR - Normal    Flu A Result Not Detected      Flu B Result Not Detected      Narrative:     This assay is an in vitro diagnostic multiplex nucleic acid amplification test for the detection and discrimination of Influenza A & B from nasopharyngeal specimens, and has been validated for use at [facility]. Negative results do not preclude Influenza A/B infections, and should not be used as the sole basis for diagnosis, treatment, or other management decisions. If Influenza A/B and RSV PCR results are negative, testing for Parainfluenza virus, Adenovirus and Metapneumovirus is routinely performed for [facility] pediatric oncology and intensive care inpatients, and  is available on other patients by placing an add-on request.   SARS-COV-2 PCR - Normal    Coronavirus 2019, PCR Not Detected      Narrative:     This assay has received FDA Emergency Use Authorization (EUA) and is only authorized for the duration of time that circumstances exist to justify the authorization of the emergency use of in vitro diagnostic tests for the detection of SARS-CoV-2 virus and/or diagnosis of COVID-19 infection under section 564(b)(1) of the Act, 21 U.S.C. 360bbb-3(b)(1). This assay is an in vitro diagnostic nucleic acid amplification test for the qualitative detection of SARS-CoV-2 from nasopharyngeal specimens and has been validated for use at [facility]. Negative results do not preclude COVID-19 infections and should not be used as the sole basis for diagnosis, treatment, or other management decisions.     MAGNESIUM - Normal    Magnesium 2.24     SERIAL TROPONIN-INITIAL - Normal    Troponin I, High Sensitivity 7      Narrative:     Less than 99th percentile of normal range cutoff-  Female and children under 18 years old <14 ng/L; Male <21 ng/L: Negative  Repeat testing should be performed if clinically indicated.     Female and children under 18 years old 14-50 ng/L; Male 21-50 ng/L:  Consistent with possible cardiac damage and possible increased clinical   risk. Serial measurements may help to assess extent of myocardial damage.     >50 ng/L: Consistent with cardiac damage, increased clinical risk and  myocardial infarction. Serial measurements may help assess extent of   myocardial damage.      NOTE: Children less than 1 year old may have higher baseline troponin   levels and results should be interpreted in conjunction with the overall   clinical context.     NOTE: Troponin I testing is performed using a different   testing methodology at [facility] than at other   [facility]. Direct result comparisons should only   be made within the same  method.   SERIAL TROPONIN, 1 HOUR - Normal    Troponin I, High Sensitivity 7      Narrative:     Less than 99th percentile of normal range cutoff-  Female and children under 18 years old <14 ng/L; Male <21 ng/L: Negative  Repeat testing should be performed if clinically indicated.     Female and children under 18 years old 14-50 ng/L; Male 21-50 ng/L:  Consistent with possible cardiac damage and possible increased clinical   risk. Serial measurements may help to assess extent of myocardial damage.     >50 ng/L: Consistent with cardiac damage, increased clinical risk and  myocardial infarction. Serial measurements may help assess extent of   myocardial damage.      NOTE: Children less than 1 year old may have higher baseline troponin   levels and results should be interpreted in conjunction with the overall   clinical context.     NOTE: Troponin I testing is performed using a different   testing methodology at [facility] than at other   [facility]. Direct result comparisons should only   be made within the same method.   RSV PCR - Normal    RSV PCR Not Detected      Narrative:     This assay is an FDA-cleared, in vitro diagnostic nucleic acid amplification test for the detection of RSV from nasopharyngeal specimens, and has been validated for use at [facility]. Negative results do not preclude RSV infections, and should not be used as the sole basis for diagnosis, treatment, or other management decisions. If Influenza A/B and RSV PCR results are negative, testing for Parainfluenza virus, Adenovirus and Metapneumovirus is routinely performed for pediatric oncology and intensive care inpatients at [facility], and is available on other patients by placing an add-on request.       TROPONIN SERIES- (INITIAL, 1 HR)    Narrative:     The following orders were created for panel order Troponin I Series, High Sensitivity (0, 1 HR).  Procedure                               Abnormality          Status                     ---------                               -----------         ------                     Troponin I, High Sensiti...[[id number]]  Normal              Final result               Troponin, High Sensitivi...[[id number]]  Normal              Final result                 Please view results for these tests on the individual orders.       All other labs were within normal range or not returned as of this dictation.    Imaging  CT angio chest for pulmonary embolism   Final Result   Chronic changes suggesting COPD and mild emphysema with bronchial wall   thickening in the bilateral lower lobes and right upper lobe which may   indicate acute or chronic bronchitis.   Subpleural area of nodularity in the lateral aspect of the right lower   lobe measuring up to 2.1 cm which may represent rounded atelectasis   however neoplasm is not entirely excluded.  Further evaluation is   recommended with nonemergent PET/CT.   Right upper lobe pleural parenchymal scarring.   Prominent but nonpathologically enlarged mediastinal and bilateral   hilar lymph nodes, most likely reactive however these can also be   better assessed on PET/CT.   Pronounced cardiomegaly.   Signed by Lui sAlberto Rosario      XR chest 2 views   Final Result   No detectable active cardiopulmonary disease. Remainder as above.   Signed by Elton Montero MD           Procedures  Procedures     EMERGENCY DEPARTMENT COURSE/MDM:     Diagnoses as of 12/31/24 1602   Acute hypoxic respiratory failure (Multi)   COPD exacerbation (Multi)        Medical Decision Making  History obtained from the patient and her son.  Records including labs, imaging, notes independently reviewed by me.  Presentation concerning for COPD exacerbation, pneumonia, pulmonary embolism, viral infection, ACS, fluid overload.  Cardiac labs were sent, patient started on stacked duo nebulizer treatments, Solu-Medrol with improvement in her subjective shortness of breath.  However, her oxygen  requirements continue to go up, especially she fell asleep.  She was ultimately placed on high flow nasal cannula.  Troponin within normal limits and stable at 7 on repeat, EKG without evidence of acute injury pattern.  Low suspicion for ACS at this time.  VBG with chronic CO2 retention at 66, patient follows up 0.4, pO2 of 58.  BNP within normal limits.  CMP with an elevated bicarb of 33 thought to be chronic due to retention.  CBC unremarkable.  Viral swabs negative for COVID, flu, RSV.  CT angio of the chest demonstrating patient is known to emphysematous changes.  No other acute pathology.  Given the new oxygen requirement, patient subsequently admitted to the medicine service for further evaluation and treatment.    EKG demonstrated normal sinus rhythm at a rate of 68, normal intervals.  No acute injury pattern.    Patient and or family in agreement and understanding of treatment plan.  All questions answered.      I reviewed the case with the attending ED physician. The attending ED physician agrees with the plan. Patient and/or patient´s representative was counseled regarding labs, imaging, likely diagnosis, and plan. All questions were answered.    ED Medications administered this visit:    Medications   oxygen (O2) therapy (30 L/min inhalation Start 12/31/24 0810)   ipratropium-albuteroL (Duo-Neb) 0.5-2.5 mg/3 mL nebulizer solution 3 mL (3 mL nebulization Given 12/31/24 1404)   methylPREDNISolone sod succinate (SOLU-Medrol) 40 mg/mL injection 40 mg (40 mg intravenous Given 12/31/24 0809)   guaiFENesin (Mucinex) 12 hr tablet 600 mg (600 mg oral Given 12/31/24 0809)   benzonatate (Tessalon) capsule 100 mg (has no administration in time range)   glucagon (Glucagen) injection 1 mg (has no administration in time range)   dextrose 50 % injection 25 g (has no administration in time range)   glucagon (Glucagen) injection 1 mg (has no administration in time range)   dextrose 50 % injection 12.5 g (has no  administration in time range)   insulin lispro injection 0-10 Units (6 Units subcutaneous Given 12/31/24 1313)   cyclobenzaprine (Flexeril) tablet 10 mg (has no administration in time range)   DULoxetine (Cymbalta) DR capsule 30 mg (has no administration in time range)   furosemide (Lasix) tablet 20 mg (has no administration in time range)   tiotropium (Spiriva Respimat) 2.5 mcg/actuation inhaler 2 puff (2 puffs inhalation Given 12/31/24 0837)     And   fluticasone furoate-vilanteroL (Breo Ellipta) 100-25 mcg/dose inhaler 1 puff (1 puff inhalation Given 12/31/24 0836)   levothyroxine (Synthroid, Levoxyl) tablet 150 mcg (150 mcg oral Given 12/31/24 0556)   lisinopril tablet 5 mg (5 mg oral Given 12/31/24 0809)   pantoprazole (ProtoNix) EC tablet 40 mg (40 mg oral Given 12/31/24 0556)   oxybutynin (Ditropan) tablet 5 mg (5 mg oral Given 12/31/24 0820)   QUEtiapine (SEROquel) tablet 50 mg (has no administration in time range)   rosuvastatin (Crestor) tablet 40 mg (40 mg oral Given 12/31/24 0820)   valACYclovir (Valtrex) tablet 500 mg (has no administration in time range)   ammonium lactate (Lac-Hydrin) 12 % lotion (has no administration in time range)   heparin (porcine) injection 7,500 Units (7,500 Units subcutaneous Given 12/31/24 1315)   methylPREDNISolone sod succinate (SOLU-Medrol) injection 125 mg (125 mg intravenous Given 12/30/24 2317)   ipratropium-albuteroL (Duo-Neb) 0.5-2.5 mg/3 mL nebulizer solution 9 mL (9 mL nebulization Given 12/30/24 3117)   iohexol (OMNIPaque) 350 mg iodine/mL solution 80 mL (80 mL intravenous Given 12/31/24 2548)          Final Impression:   1. Acute hypoxic respiratory failure (Multi)    2. COPD exacerbation (Multi)          (Please note that portions of this note were completed with a voice recognition program.  Efforts were made to edit the dictations but occasionally words are mis-transcribed.)     Wale Rollins MD  Resident  12/31/24 4164      I did evaluate the patient  independently from the resident and was present for key medical decision making.  Agree with disposition.  Any discrepancies between residents findings are detailed below.    This is a 64-year-old female presenting to the emergency department for cough congestion with associated shortness of breath.  She states symptoms started around 24 December.  She has had a productive green sputum this cough.  She went to urgent care earlier today was found to be saturating at 79% ambulance was called.  She was sent to the emergency department for further evaluation. She denies any associated fevers.  She does use nebulized treatments although has not been able to use them secondary to her pathology now.    VS: As documented in the triage note from today's date and EMR flowsheet were reviewed.  Gen: Obese.  Visibly uncomfortable seated upright in the bed.  Alert and oriented to person time place.  Skin: Warm. Dry. Intact. No rashes or lesions.  Eyes: Pupils equally round and reactive to light. Clear sclera.   HENT: Atraumatic appearance. Mucosal membranes moist. No oral lesions, uvula midline, airway patent.  Clear drainage bilateral nares.  No meningismal signs.  CV: Regular rate and regular rhythm. S1, S2. No pedal edema. Warm extremities.  Resp: Nonlabored breathing diminished throughout.  Mild accessory muscle usage.  Saturating appropriately on high flow nasal cannula.  GI: Soft and nontender. No rebound or guarding. Bowel sounds x4 present.   MSK: Symmetric muscle bulk. No joint swelling in the extremities. Compartments are soft. Neurovascularly intact x4 extremities. Radial pulses +2 equal bilaterally.   Neuro: Alert. CN II - XII intact. Speech fluent. Moving all extremities. No focal deficits.   Psych: Disheveled.    Patient arrives saturating appropriately on nasal cannula although saturating in the low 90%.  She does have increased work of breathing decision was made to place on high flow nasal cannula this improved  her work of breathing significantly no indication for noninvasive positive pressure at this time.  DuoNebs x 3 as well as Solu-Medrol 125 ordered for suspected COPD exacerbation.  She has no significant electrolyte derangements or function.  VBG is reassuring and shows compensation.  No signs of anemia no leukocytosis making infectious etiology less likely.  Viral swabs are negative.  Chest x-ray shows no evidence of pneumonia on my interpretation.  CTA was pursued due to the hypoxia.  CT imaging shows no evidence of PE multiple incidental findings including cannot rule out neoplasm all incidental findings were thoroughly discussed with the patient recommend close outpatient follow-up.  The resident spoke with the admitting physician who agreed to accept the patient he did take over care at time of admission order.  Patient is resting comfortably agreeable with plan appreciative of care.  EKG on my interpretation shows no acute injury pattern sinus rhythm.  Zaid Dhillon, DO Zaid Dhillon,   12/31/24 [id number]     without difficulty

## 2024-12-31 NOTE — PROGRESS NOTES
Pharmacy Medication History Review    Danelle Jacobs is a 64 y.o. female admitted for Acute hypoxic respiratory failure (Multi). Pharmacy reviewed the patient's [id number] medications and allergies for accuracy.    The list below reflectives the updated PTA list. Please review each medication in order reconciliation for additional clarification and justification.  Prior to Admission medications    Medication Sig Start Date End Date Taking? Authorizing Provider   ammonium lactate (Lac-Hydrin) 12 % lotion Apply topically if needed for dry skin. Apply to dry and rough skin area (avoid face)   Yes Historical Provider, MD   cyclobenzaprine (Flexeril) 10 mg tablet Take 1 tablet (10 mg) by mouth 3 times a day as needed for muscle spasms. 12/10/24  Yes Zaid Dooley, DO   fluticasone-umeclidin-vilanter (TRELEGY-ELLIPTA) 100-62.5-25 mcg blister with device Inhale 1 puff once daily. 3/14/24 7/7/25 Yes Zaid Dooley DO   furosemide (Lasix) 20 mg tablet Take 1 tablet (20 mg) by mouth once daily as needed (swelling). 12/10/24  Yes Zaid Dooley DO   ipratropium-albuteroL (Combivent Respimat)  mcg/actuation inhaler Inhale 1 puff 4 times a day.  Patient taking differently: Inhale 1 puff 4 times a day as needed for wheezing or shortness of breath. 3/14/24 3/14/25 Yes Zaid Dooley DO   ipratropium-albuteroL (Duo-Neb) 0.5-2.5 mg/3 mL nebulizer solution Take 3 mL by nebulization every 4 hours if needed for wheezing. 12/10/24  Yes Zaid Dooley DO   levothyroxine (Synthroid, Levoxyl) 150 mcg tablet Take 1 tablet (150 mcg) by mouth early in the morning.. 12/10/24  Yes Zaid Dooley DO   lisinopril 5 mg tablet TAKE 1 TABLET DAILY 7/15/24  Yes Zaid Dooley DO   metFORMIN (Glucophage) 500 mg tablet Take 1 tablet (500 mg) by mouth 2 times a day. 12/10/24  Yes Zaid E Saridakis, DO   omeprazole (PriLOSEC) 40 mg DR capsule Take 1 capsule (40 mg) by mouth once daily in the  morning. Take before meals.  Patient taking differently: Take 1 capsule (40 mg) by mouth once daily as needed (heartburn). 6/12/24 3/9/25 Yes Zaid Dooley DO   oxybutynin XL (Ditropan-XL) 15 mg 24 hr tablet Take 1 tablet (15 mg) by mouth early in the morning.. 6/2/24  Yes Historical Provider, MD   PHENobarbital-hyoscyamine-atropine-scopoloamine (Donnatal) 16.2-0.1037 -0.0194 mg tablet Take 1-2 tablets by mouth 3 times a day.  Patient taking differently: Take 1-2 tablets by mouth 3 times a day as needed for cramping or indigestion. 12/10/24  Yes Zaid Dooley DO   valACYclovir (Valtrex) 500 mg tablet Take 1 tablet (500 mg) by mouth 3 times a day as needed (cold sores). 12/10/24 12/10/25 Yes Zaid Dooley DO   blood sugar diagnostic (Blood Glucose Test) strip Test once to twice daily 8/6/24   Zaid Dooley DO   blood-glucose meter misc Test once to twice daily 8/6/24   Zaid Dooley DO   DULoxetine (Cymbalta) 30 mg DR capsule Take 1 capsule (30 mg) by mouth once daily at bedtime.  Patient not taking: Reported on 12/31/2024 6/19/24  no Historical Provider, MD   lancets misc Test once daily as directed 8/6/24   Zaid Dooley DO   QUEtiapine (SEROquel) 50 mg tablet Take 1 tablet (50 mg) by mouth once daily at bedtime. 12/10/24 6/8/25 yes Zaid Dooley DO   rosuvastatin (Crestor) 40 mg tablet Take 1 tablet (40 mg) by mouth once daily.  Patient taking differently: Take 1 tablet (40 mg) by mouth once daily at bedtime. 12/10/24  yes Zaid Dooley DO   tirzepatide (Mounjaro) 7.5 mg/0.5 mL pen injector Inject 7.5 mg under the skin 1 (one) time per week.  Patient taking differently: Inject 7.5 mg under the skin 1 (one) time per week. Every Jorgito 12/10/24  yes Zaid Dooley, DO        The list below reflectives the updated allergy list. Please review each documented allergy for additional clarification and justification.  Allergies  Reviewed by Lin Segovia on  12/31/2024   No Known Allergies         Below are additional concerns with the patient's PTA list.      Lin Segovia

## 2024-12-31 NOTE — H&P
History Of Present Illness  Danelle Jacobs is a 64 y.o. female presenting to the emergency department for evaluation of cough and congestion.  Patient went to urgent care yesterday afternoon at approximately 3 PM and was found to have a low pulse ox of 79%.  Patient was sent by urgent care to emergency department for further evaluation.  Patient admits to history of COPD and states she is not normally on oxygen.  Patient states that she typically uses her nebulizer at home but has been unable to since she has been without power since Thursday.  Patient admits to a productive cough with yellow, greenish sputum and notes sinus drainage, runny nose, congestion, and intermittent shortness of breath that is worse with exertion.  Patient denies chest pain or dizziness.  Patient denies nausea, vomiting, diarrhea, fever, chills, abdominal, or urinary symptoms.  Patient was placed on high flow oxygen after she was moved to a room in the emergency department.    In ED, glucose 103, bicarb 33, hematocrit 46.7.  Magnesium, BNP, troponin all within normal limits.  Flu, COVID negative.  RSV ordered and pending.  Patient remains on high flow oxygen.  Blood pressure 139/64, heart rate 81, respirations 18, temperature 37.2 °C, SpO2 97%.  Patient given Solu-Medrol 125 mg IV, DuoNeb treatment in ED.  RT evaluation completed and DuoNeb treatments ordered every 2 hours.  CT angio of the chest completed showing chronic changes, COPD and emphysema, no acute process, lung nodule (subpleural) noted in the right lower lobe with recommendation of an outpatient nonemergent PET scan 2/2 findings of prominent interstitial/hilar lymph nodes per radiology review.  Chest x-ray completed and negative for acute process per radiology review.  Patient is a former smoker who quit in 2018.  Patient will be placed on SDU and admitted to Dr. Abreu who will continue to follow.  I was asked to do H&P and place initial admission orders.  PCP   "Judy.    *Prior medical records reviewed     Past Medical History  COPD, diabetes on Mounjaro, hypothyroidism, insomnia, hyperlipidemia, recurrent cold sores, right lower extremity arterial insufficiency, chronic low back pain    Surgical History  Appendectomy, hernia repair, cholecystectomy, foot surgery, lung biopsy     Social History  Former smoker (quit in 2018), no drug use, no alcohol use  Family History  CVA, Alzheimer's     Allergies  NKDA/NKA    Review of Systems  A 10 point review of systems was completed and negative except what is listed in HPI  Physical Exam  Constitutional:       Appearance: She is obese.   HENT:      Mouth/Throat:      Mouth: Mucous membranes are dry.      Pharynx: Oropharynx is clear.   Eyes:      Pupils: Pupils are equal, round, and reactive to light.   Cardiovascular:      Rate and Rhythm: Normal rate and regular rhythm.   Pulmonary:      Breath sounds: Decreased breath sounds present.   Abdominal:      General: Bowel sounds are normal.   Musculoskeletal:      Cervical back: Normal range of motion.      Comments: Mild edema, nonpitting, to right lower extremity   Skin:     General: Skin is warm and dry.      Capillary Refill: Capillary refill takes less than 2 seconds.   Neurological:      General: No focal deficit present.      Mental Status: She is alert and oriented to person, place, and time.   Psychiatric:         Mood and Affect: Mood normal.          Last Recorded Vitals  Blood pressure 139/66, pulse 79, temperature 36.8 °C (98.2 °F), temperature source Temporal, resp. rate 20, height 1.702 m (5' 7\"), weight 120 kg (265 lb), SpO2 95%.    Relevant Results  CT angio chest for pulmonary embolism    Result Date: 12/31/2024  STUDY: CT Angiogram of the Chest; 12/31/2024 2:29 AM INDICATION: Hypoxia.  Tachycardia. COMPARISON: CXR 12/30/2024. ACCESSION NUMBER(S): [id number] ORDERING CLINICIAN: JO VAZQUEZ TECHNIQUE:  CTA of the chest was performed with intravenous " contrast. Images are reviewed and processed at a workstation according to the CT angiogram protocol with 3-D and/or MIP post processing imaging generated.  Omnipaque 350 80 mL was administered intravenously. Automated mA/kV exposure control was utilized and patient examination was performed in strict accordance with principles of ALARA. FINDINGS: Image quality is limited due to the patient's body habitus as well as mild respiratory motion limiting assessment of the smaller pulmonary arteries.  Pulmonary arteries are adequately opacified without acute or chronic filling defects.  The thoracic aorta is normal in course and caliber without dissection or aneurysm. The heart is markedly enlarged in size without pericardial effusion. Slightly prominent but nonpathologically enlarged mediastinal and bilateral hilar lymph nodes are identified. Trachea and mainstem bronchi are patent and clear of debris.  Pleural parenchymal scarring is noted along the right upper lobe.  There is a subpleural soft tissue density in the lateral basilar segment of the right lower lobe measuring 2.1 x 1.1 cm in diameter on image 172 of series 406 which may represent rounded atelectasis however neoplasm is not entirely excluded.  Atelectasis is seen at the left lung base. There is mild bilateral lower lobe bronchial wall thickening along with mild bronchial wall thickening in the right upper lobe.  Minimal centrilobular emphysema is seen in the bilateral upper lobes. Upper abdomen demonstrates no acute pathology. There are no acute fractures.  No suspicious bony lesions. Generalized osseous demineralization is noted with a slightly pronounced thoracic kyphosis.    Chronic changes suggesting COPD and mild emphysema with bronchial wall thickening in the bilateral lower lobes and right upper lobe which may indicate acute or chronic bronchitis. Subpleural area of nodularity in the lateral aspect of the right lower lobe measuring up to 2.1 cm which  may represent rounded atelectasis however neoplasm is not entirely excluded.  Further evaluation is recommended with nonemergent PET/CT. Right upper lobe pleural parenchymal scarring. Prominent but nonpathologically enlarged mediastinal and bilateral hilar lymph nodes, most likely reactive however these can also be better assessed on PET/CT. Pronounced cardiomegaly. Signed by Luis Alberto Rosario    XR chest 2 views    Result Date: 12/30/2024  STUDY: Chest Radiographs;  12/20/2024, 4:02PM INDICATION: Shortness of breath and hypoxia. COMPARISON: None Available ACCESSION NUMBER(S): [id number] ORDERING CLINICIAN: JO VAZQUEZ TECHNIQUE:  Frontal and lateral chest. FINDINGS: Heart is top normal size. There is central vascular prominence without vascular congestion. No infiltrate is seen. There is minimal right costophrenic angle blunting, without significant pleural effusion on lateral view. There is no pneumothorax. The bony thorax reveals minimal thoracic spine arthritis and moderate kyphosis.    No detectable active cardiopulmonary disease. Remainder as above. Signed by Elton Montero MD   Results for orders placed or performed during the hospital encounter of 12/30/24 (from the past 24 hours)   ECG 12 lead   Result Value Ref Range    Ventricular Rate 68 BPM    Atrial Rate 68 BPM    WY Interval 164 ms    QRS Duration 84 ms    QT Interval 418 ms    QTC Calculation(Bazett) 444 ms    P Axis 67 degrees    R Axis 94 degrees    T Axis 62 degrees    QRS Count 11 beats    Q Onset 220 ms    P Onset 138 ms    P Offset 202 ms    T Offset 429 ms    QTC Fredericia 436 ms   Influenza A, and B PCR   Result Value Ref Range    Flu A Result Not Detected Not Detected    Flu B Result Not Detected Not Detected   Sars-CoV-2 PCR   Result Value Ref Range    Coronavirus 2019, PCR Not Detected Not Detected   RSV PCR   Result Value Ref Range    RSV PCR Not Detected Not Detected   CBC and Auto Differential   Result Value Ref Range    WBC 10.1  4.4 - 11.3 x10*3/uL    nRBC 0.0 0.0 - 0.0 /100 WBCs    RBC 5.23 (H) 4.00 - 5.20 x10*6/uL    Hemoglobin 14.3 12.0 - 16.0 g/dL    Hematocrit 46.7 (H) 36.0 - 46.0 %    MCV 89 80 - 100 fL    MCH 27.3 26.0 - 34.0 pg    MCHC 30.6 (L) 32.0 - 36.0 g/dL    RDW 16.4 (H) 11.5 - 14.5 %    Platelets 266 150 - 450 x10*3/uL    Neutrophils % 66.3 40.0 - 80.0 %    Immature Granulocytes %, Automated 0.2 0.0 - 0.9 %    Lymphocytes % 25.7 13.0 - 44.0 %    Monocytes % 5.3 2.0 - 10.0 %    Eosinophils % 1.8 0.0 - 6.0 %    Basophils % 0.7 0.0 - 2.0 %    Neutrophils Absolute 6.67 1.20 - 7.70 x10*3/uL    Immature Granulocytes Absolute, Automated 0.02 0.00 - 0.70 x10*3/uL    Lymphocytes Absolute 2.59 1.20 - 4.80 x10*3/uL    Monocytes Absolute 0.53 0.10 - 1.00 x10*3/uL    Eosinophils Absolute 0.18 0.00 - 0.70 x10*3/uL    Basophils Absolute 0.07 0.00 - 0.10 x10*3/uL   B-Type Natriuretic Peptide   Result Value Ref Range    BNP 24 0 - 99 pg/mL   Troponin I, High Sensitivity, Initial   Result Value Ref Range    Troponin I, High Sensitivity 7 0 - 13 ng/L   Blood Gas Venous Full Panel   Result Value Ref Range    POCT pH, Venous 7.40 7.33 - 7.43 pH    POCT pCO2, Venous 66 (H) 41 - 51 mm Hg    POCT pO2, Venous 58 (H) 35 - 45 mm Hg    POCT SO2, Venous 90 (H) 45 - 75 %    POCT Oxy Hemoglobin, Venous 86.9 (H) 45.0 - 75.0 %    POCT Hematocrit Calculated, Venous 44.0 36.0 - 46.0 %    POCT Sodium, Venous 137 136 - 145 mmol/L    POCT Potassium, Venous 4.3 3.5 - 5.3 mmol/L    POCT Chloride, Venous 101 98 - 107 mmol/L    POCT Ionized Calicum, Venous 1.37 (H) 1.10 - 1.33 mmol/L    POCT Glucose, Venous 132 (H) 74 - 99 mg/dL    POCT Lactate, Venous 1.5 0.4 - 2.0 mmol/L    POCT Base Excess, Venous 12.8 (H) -2.0 - 3.0 mmol/L    POCT HCO3 Calculated, Venous 40.9 (H) 22.0 - 26.0 mmol/L    POCT Hemoglobin, Venous 14.7 12.0 - 16.0 g/dL    POCT Anion Gap, Venous -1.0 (L) 10.0 - 25.0 mmol/L    Patient Temperature 37.0 degrees Celsius    FiO2 36 %   Comprehensive  metabolic panel   Result Value Ref Range    Glucose 103 (H) 74 - 99 mg/dL    Sodium 139 136 - 145 mmol/L    Potassium 4.0 3.5 - 5.3 mmol/L    Chloride 99 98 - 107 mmol/L    Bicarbonate 33 (H) 21 - 32 mmol/L    Anion Gap 11 10 - 20 mmol/L    Urea Nitrogen 10 6 - 23 mg/dL    Creatinine 0.71 0.50 - 1.05 mg/dL    eGFR >90 >60 mL/min/1.73m*2    Calcium 10.3 8.6 - 10.3 mg/dL    Albumin 3.9 3.4 - 5.0 g/dL    Alkaline Phosphatase 91 33 - 136 U/L    Total Protein 7.6 6.4 - 8.2 g/dL    AST 14 9 - 39 U/L    Bilirubin, Total 0.4 0.0 - 1.2 mg/dL    ALT 13 7 - 45 U/L   Magnesium   Result Value Ref Range    Magnesium 2.24 1.60 - 2.40 mg/dL   Troponin, High Sensitivity, 1 Hour   Result Value Ref Range    Troponin I, High Sensitivity 7 0 - 13 ng/L   POCT GLUCOSE   Result Value Ref Range    POCT Glucose 253 (H) 74 - 99 mg/dL   POCT GLUCOSE   Result Value Ref Range    POCT Glucose 266 (H) 74 - 99 mg/dL       Assessment/Plan   Danelle is a 64-year-old female patient presenting to emergency department for evaluation of cough and congestion.  Patient states that she has a history of COPD and is not on home oxygen, usually uses her nebulizer however her power has been out at home since Thursday.  Patient went to urgent care for evaluation and was found to be hypoxic with an SpO2 of 79%.  Patient was sent to ED for further evaluation.  Patient was placed on high flow oxygen in ED, imaging completed showing chronic changes, negative acute however pulmonary and hilar lymph nodes were found and a PET scan, nonemergent is recommended.  Patient was treated with IV Solu-Medrol and DuoNeb treatments, remains on high flow oxygen, admitted for further medical management.    COPD exacerbation/acute respiratory failure  Inpatient telemetry SDU admission to Dr. Abreu  See imaging results above   Continue high flow oxygen  Nebulizer treatments every 2 hours per RT  Continue Solu-Medrol 40 mg IV push every 8 hours  Mucinex/Tessalon  Telemetry  monitoring  COPD navigator    HTN/diabetes/COPD/hypothyroidism/insomnia/HLD/recurrent cold sores/chronic low back pain/right lower extremity insufficiency  #Chronic conditions  Continue home medications as ordered  Diabetic/cardiac diet  Hold home oral antidiabetic medication  Insulin sliding scale  Hypoglycemia protocol    DVT Ppx  SCDs  Heparin subcutaneous  Out of bed with assistance    Patient fully evaluated and on Airvo 60 L 60% FiO2.  Pulmonary consultation is pending.  Continue aggressive pulmonary hygiene  Patient fully evaluated and plan as above. .  Continue aggressive pulmonary hygiene.  I spent 60 minutes in the professional and overall care of this patient.  Kong Darling MD

## 2025-01-01 LAB
ANION GAP SERPL CALC-SCNC: 9 MMOL/L (ref 10–20)
BUN SERPL-MCNC: 19 MG/DL (ref 6–23)
CALCIUM SERPL-MCNC: 9.9 MG/DL (ref 8.6–10.3)
CHLORIDE SERPL-SCNC: 102 MMOL/L (ref 98–107)
CO2 SERPL-SCNC: 30 MMOL/L (ref 21–32)
CREAT SERPL-MCNC: 0.79 MG/DL (ref 0.5–1.05)
EGFRCR SERPLBLD CKD-EPI 2021: 83 ML/MIN/1.73M*2
ERYTHROCYTE [DISTWIDTH] IN BLOOD BY AUTOMATED COUNT: 15.9 % (ref 11.5–14.5)
GLUCOSE BLD MANUAL STRIP-MCNC: 258 MG/DL (ref 74–99)
GLUCOSE BLD MANUAL STRIP-MCNC: 294 MG/DL (ref 74–99)
GLUCOSE SERPL-MCNC: 277 MG/DL (ref 74–99)
HCT VFR BLD AUTO: 41.7 % (ref 36–46)
HGB BLD-MCNC: 12.3 G/DL (ref 12–16)
MCH RBC QN AUTO: 27.2 PG (ref 26–34)
MCHC RBC AUTO-ENTMCNC: 29.5 G/DL (ref 32–36)
MCV RBC AUTO: 92 FL (ref 80–100)
NRBC BLD-RTO: 0 /100 WBCS (ref 0–0)
PLATELET # BLD AUTO: 260 X10*3/UL (ref 150–450)
POTASSIUM SERPL-SCNC: 4 MMOL/L (ref 3.5–5.3)
RBC # BLD AUTO: 4.53 X10*6/UL (ref 4–5.2)
SODIUM SERPL-SCNC: 137 MMOL/L (ref 136–145)
WBC # BLD AUTO: 12.7 X10*3/UL (ref 4.4–11.3)

## 2025-01-01 PROCEDURE — 2500000005 HC RX 250 GENERAL PHARMACY W/O HCPCS: Performed by: STUDENT IN AN ORGANIZED HEALTH CARE EDUCATION/TRAINING PROGRAM

## 2025-01-01 PROCEDURE — 94640 AIRWAY INHALATION TREATMENT: CPT

## 2025-01-01 PROCEDURE — 2500000002 HC RX 250 W HCPCS SELF ADMINISTERED DRUGS (ALT 637 FOR MEDICARE OP, ALT 636 FOR OP/ED): Performed by: STUDENT IN AN ORGANIZED HEALTH CARE EDUCATION/TRAINING PROGRAM

## 2025-01-01 PROCEDURE — 2060000001 HC INTERMEDIATE ICU ROOM DAILY

## 2025-01-01 PROCEDURE — 36415 COLL VENOUS BLD VENIPUNCTURE: CPT | Performed by: NURSE PRACTITIONER

## 2025-01-01 PROCEDURE — 2500000001 HC RX 250 WO HCPCS SELF ADMINISTERED DRUGS (ALT 637 FOR MEDICARE OP): Performed by: NURSE PRACTITIONER

## 2025-01-01 PROCEDURE — 2500000002 HC RX 250 W HCPCS SELF ADMINISTERED DRUGS (ALT 637 FOR MEDICARE OP, ALT 636 FOR OP/ED): Performed by: NURSE PRACTITIONER

## 2025-01-01 PROCEDURE — 85027 COMPLETE CBC AUTOMATED: CPT | Performed by: NURSE PRACTITIONER

## 2025-01-01 PROCEDURE — 80048 BASIC METABOLIC PNL TOTAL CA: CPT | Performed by: NURSE PRACTITIONER

## 2025-01-01 PROCEDURE — 2500000004 HC RX 250 GENERAL PHARMACY W/ HCPCS (ALT 636 FOR OP/ED): Performed by: INTERNAL MEDICINE

## 2025-01-01 PROCEDURE — 82947 ASSAY GLUCOSE BLOOD QUANT: CPT

## 2025-01-01 PROCEDURE — 2500000004 HC RX 250 GENERAL PHARMACY W/ HCPCS (ALT 636 FOR OP/ED): Performed by: NURSE PRACTITIONER

## 2025-01-01 RX ADMIN — QUETIAPINE FUMARATE 50 MG: 25 TABLET ORAL at 00:56

## 2025-01-01 RX ADMIN — IPRATROPIUM BROMIDE AND ALBUTEROL SULFATE 3 ML: .5; 3 SOLUTION RESPIRATORY (INHALATION) at 12:25

## 2025-01-01 RX ADMIN — IPRATROPIUM BROMIDE AND ALBUTEROL SULFATE 3 ML: .5; 3 SOLUTION RESPIRATORY (INHALATION) at 23:04

## 2025-01-01 RX ADMIN — ROSUVASTATIN CALCIUM 40 MG: 10 TABLET, FILM COATED ORAL at 12:30

## 2025-01-01 RX ADMIN — HEPARIN SODIUM 7500 UNITS: 5000 INJECTION INTRAVENOUS; SUBCUTANEOUS at 14:59

## 2025-01-01 RX ADMIN — TIOTROPIUM BROMIDE INHALATION SPRAY 2 PUFF: 3.12 SPRAY, METERED RESPIRATORY (INHALATION) at 06:55

## 2025-01-01 RX ADMIN — DULOXETINE HYDROCHLORIDE 30 MG: 30 CAPSULE, DELAYED RELEASE ORAL at 20:41

## 2025-01-01 RX ADMIN — METHYLPREDNISOLONE SODIUM SUCCINATE 40 MG: 40 INJECTION, POWDER, FOR SOLUTION INTRAMUSCULAR; INTRAVENOUS at 12:12

## 2025-01-01 RX ADMIN — PANTOPRAZOLE SODIUM 40 MG: 40 TABLET, DELAYED RELEASE ORAL at 06:00

## 2025-01-01 RX ADMIN — INSULIN LISPRO 6 UNITS: 100 INJECTION, SOLUTION INTRAVENOUS; SUBCUTANEOUS at 12:12

## 2025-01-01 RX ADMIN — METHYLPREDNISOLONE SODIUM SUCCINATE 40 MG: 40 INJECTION, POWDER, FOR SOLUTION INTRAMUSCULAR; INTRAVENOUS at 20:41

## 2025-01-01 RX ADMIN — LEVOTHYROXINE SODIUM 150 MCG: 0.15 TABLET ORAL at 06:00

## 2025-01-01 RX ADMIN — Medication 6 L/MIN: at 12:07

## 2025-01-01 RX ADMIN — HEPARIN SODIUM 7500 UNITS: 5000 INJECTION INTRAVENOUS; SUBCUTANEOUS at 20:41

## 2025-01-01 RX ADMIN — METHYLPREDNISOLONE SODIUM SUCCINATE 40 MG: 40 INJECTION, POWDER, FOR SOLUTION INTRAMUSCULAR; INTRAVENOUS at 00:56

## 2025-01-01 RX ADMIN — LISINOPRIL 5 MG: 5 TABLET ORAL at 12:12

## 2025-01-01 RX ADMIN — AZITHROMYCIN MONOHYDRATE 500 MG: 500 INJECTION, POWDER, LYOPHILIZED, FOR SOLUTION INTRAVENOUS at 12:12

## 2025-01-01 RX ADMIN — HEPARIN SODIUM 7500 UNITS: 5000 INJECTION INTRAVENOUS; SUBCUTANEOUS at 06:00

## 2025-01-01 RX ADMIN — FLUTICASONE FUROATE AND VILANTEROL TRIFENATATE 1 PUFF: 100; 25 POWDER RESPIRATORY (INHALATION) at 06:54

## 2025-01-01 RX ADMIN — GUAIFENESIN 600 MG: 600 TABLET, EXTENDED RELEASE ORAL at 12:12

## 2025-01-01 RX ADMIN — OXYBUTYNIN CHLORIDE 5 MG: 5 TABLET ORAL at 12:30

## 2025-01-01 RX ADMIN — IPRATROPIUM BROMIDE AND ALBUTEROL SULFATE 3 ML: .5; 3 SOLUTION RESPIRATORY (INHALATION) at 06:52

## 2025-01-01 RX ADMIN — OXYBUTYNIN CHLORIDE 5 MG: 5 TABLET ORAL at 21:13

## 2025-01-01 RX ADMIN — GUAIFENESIN 600 MG: 600 TABLET, EXTENDED RELEASE ORAL at 20:41

## 2025-01-01 RX ADMIN — QUETIAPINE FUMARATE 50 MG: 25 TABLET ORAL at 20:41

## 2025-01-01 ASSESSMENT — PAIN SCALES - GENERAL
PAINLEVEL_OUTOF10: 0 - NO PAIN
PAINLEVEL_OUTOF10: 2
PAINLEVEL_OUTOF10: 0 - NO PAIN
PAINLEVEL_OUTOF10: 0 - NO PAIN

## 2025-01-01 ASSESSMENT — PAIN - FUNCTIONAL ASSESSMENT
PAIN_FUNCTIONAL_ASSESSMENT: 0-10

## 2025-01-01 ASSESSMENT — COGNITIVE AND FUNCTIONAL STATUS - GENERAL
DAILY ACTIVITIY SCORE: 24
MOBILITY SCORE: 23
CLIMB 3 TO 5 STEPS WITH RAILING: A LITTLE

## 2025-01-01 NOTE — PROGRESS NOTES
Danelle Jacobs is a 64 y.o. female on day 1 of admission presenting with Acute hypoxic respiratory failure (Multi).      Subjective   Patient fully evaluated on January 1 and requiring 5 L nasal cannula.  Azithromycin added to present regimen as well as pulmonary consultation obtained.  Recheck labs in AM.       Objective     Last Recorded Vitals  /63 (BP Location: Right arm, Patient Position: Lying)   Pulse 86   Temp 36.5 °C (97.7 °F) (Temporal)   Resp 18   Wt 120 kg (265 lb)   SpO2 (!) 93%   Intake/Output last 3 Shifts:    Intake/Output Summary (Last 24 hours) at 1/1/2025 1452  Last data filed at 1/1/2025 1312  Gross per 24 hour   Intake 250 ml   Output --   Net 250 ml       Admission Weight  Weight: 120 kg (265 lb) (12/30/24 1532)    Daily Weight  12/30/24 : 120 kg (265 lb)    Image Results  ECG 12 lead  Normal sinus rhythm  Rightward axis  Nonspecific ST abnormality  Abnormal ECG  No previous ECGs available  CT angio chest for pulmonary embolism  Narrative: STUDY:  CT Angiogram of the Chest; 12/31/2024 2:29 AM  INDICATION:  Hypoxia.  Tachycardia.  COMPARISON:  CXR 12/30/2024.  ACCESSION NUMBER(S):  [id number]  ORDERING CLINICIAN:  JO VAZQUEZ  TECHNIQUE:  CTA of the chest was performed with intravenous contrast.   Images are reviewed and processed at a workstation according to the CT  angiogram protocol with 3-D and/or MIP post processing imaging  generated.  Omnipaque 350 80 mL was administered intravenously.  Automated mA/kV exposure control was utilized and patient examination  was performed in strict accordance with principles of ALARA.  FINDINGS:  Image quality is limited due to the patient's body habitus as well as  mild respiratory motion limiting assessment of the smaller pulmonary  arteries.  Pulmonary arteries are adequately opacified without acute  or chronic filling defects.  The thoracic aorta is normal in course  and caliber without dissection or aneurysm.  The heart is  markedly enlarged in size without pericardial effusion.   Slightly prominent but nonpathologically enlarged mediastinal and  bilateral hilar lymph nodes are identified.  Trachea and mainstem bronchi are patent and clear of debris.  Pleural  parenchymal scarring is noted along the right upper lobe.  There is a  subpleural soft tissue density in the lateral basilar segment of the  right lower lobe measuring 2.1 x 1.1 cm in diameter on image 172 of  series 406 which may represent rounded atelectasis however neoplasm is  not entirely excluded.  Atelectasis is seen at the left lung base.   There is mild bilateral lower lobe bronchial wall thickening along  with mild bronchial wall thickening in the right upper lobe.  Minimal  centrilobular emphysema is seen in the bilateral upper lobes.  Upper abdomen demonstrates no acute pathology.  There are no acute fractures.  No suspicious bony lesions.   Generalized osseous demineralization is noted with a slightly  pronounced thoracic kyphosis.  Impression: Chronic changes suggesting COPD and mild emphysema with bronchial wall  thickening in the bilateral lower lobes and right upper lobe which may  indicate acute or chronic bronchitis.  Subpleural area of nodularity in the lateral aspect of the right lower  lobe measuring up to 2.1 cm which may represent rounded atelectasis  however neoplasm is not entirely excluded.  Further evaluation is  recommended with nonemergent PET/CT.  Right upper lobe pleural parenchymal scarring.  Prominent but nonpathologically enlarged mediastinal and bilateral  hilar lymph nodes, most likely reactive however these can also be  better assessed on PET/CT.  Pronounced cardiomegaly.  Signed by Luis Alberto Rosario      Physical Exam    Relevant Results               Assessment/Plan            Kong Darling MD  Physician  Internal Medicine     H&P     Addendum     Date of Service: 12/31/2024  1:02 PM     Addendum       Expand All Collapse All    History Of  Present Illness  Danelle Jacobs is a 64 y.o. female presenting to the emergency department for evaluation of cough and congestion.  Patient went to urgent care yesterday afternoon at approximately 3 PM and was found to have a low pulse ox of 79%.  Patient was sent by urgent care to emergency department for further evaluation.  Patient admits to history of COPD and states she is not normally on oxygen.  Patient states that she typically uses her nebulizer at home but has been unable to since she has been without power since Thursday.  Patient admits to a productive cough with yellow, greenish sputum and notes sinus drainage, runny nose, congestion, and intermittent shortness of breath that is worse with exertion.  Patient denies chest pain or dizziness.  Patient denies nausea, vomiting, diarrhea, fever, chills, abdominal, or urinary symptoms.  Patient was placed on high flow oxygen after she was moved to a room in the emergency department.     In ED, glucose 103, bicarb 33, hematocrit 46.7.  Magnesium, BNP, troponin all within normal limits.  Flu, COVID negative.  RSV ordered and pending.  Patient remains on high flow oxygen.  Blood pressure 139/64, heart rate 81, respirations 18, temperature 37.2 °C, SpO2 97%.  Patient given Solu-Medrol 125 mg IV, DuoNeb treatment in ED.  RT evaluation completed and DuoNeb treatments ordered every 2 hours.  CT angio of the chest completed showing chronic changes, COPD and emphysema, no acute process, lung nodule (subpleural) noted in the right lower lobe with recommendation of an outpatient nonemergent PET scan 2/2 findings of prominent interstitial/hilar lymph nodes per radiology review.  Chest x-ray completed and negative for acute process per radiology review.  Patient is a former smoker who quit in 2018.  Patient will be placed on SDU and admitted to Dr. Abreu who will continue to follow.  I was asked to do H&P and place initial admission orders.  PCP Dr. Kim.     *Prior  "medical records reviewed     Past Medical History  COPD, diabetes on Mounjaro, hypothyroidism, insomnia, hyperlipidemia, recurrent cold sores, right lower extremity arterial insufficiency, chronic low back pain    Surgical History  Appendectomy, hernia repair, cholecystectomy, foot surgery, lung biopsy     Social History  Former smoker (quit in 2018), no drug use, no alcohol use  Family History  CVA, Alzheimer's     Allergies  NKDA/NKA     Review of Systems  A 10 point review of systems was completed and negative except what is listed in HPI  Physical Exam  Constitutional:       Appearance: She is obese.   HENT:      Mouth/Throat:      Mouth: Mucous membranes are dry.      Pharynx: Oropharynx is clear.   Eyes:      Pupils: Pupils are equal, round, and reactive to light.   Cardiovascular:      Rate and Rhythm: Normal rate and regular rhythm.   Pulmonary:      Breath sounds: Decreased breath sounds present.   Abdominal:      General: Bowel sounds are normal.   Musculoskeletal:      Cervical back: Normal range of motion.      Comments: Mild edema, nonpitting, to right lower extremity   Skin:     General: Skin is warm and dry.      Capillary Refill: Capillary refill takes less than 2 seconds.   Neurological:      General: No focal deficit present.      Mental Status: She is alert and oriented to person, place, and time.   Psychiatric:         Mood and Affect: Mood normal.            Last Recorded Vitals  Blood pressure 139/66, pulse 79, temperature 36.8 °C (98.2 °F), temperature source Temporal, resp. rate 20, height 1.702 m (5' 7\"), weight 120 kg (265 lb), SpO2 95%.     Relevant Results  CT angio chest for pulmonary embolism     Result Date: 12/31/2024  STUDY: CT Angiogram of the Chest; 12/31/2024 2:29 AM INDICATION: Hypoxia.  Tachycardia. COMPARISON: CXR 12/30/2024. ACCESSION NUMBER(S): [id number] ORDERING CLINICIAN: JO VAZQUEZ TECHNIQUE:  CTA of the chest was performed with intravenous contrast. Images are " reviewed and processed at a workstation according to the CT angiogram protocol with 3-D and/or MIP post processing imaging generated.  Omnipaque 350 80 mL was administered intravenously. Automated mA/kV exposure control was utilized and patient examination was performed in strict accordance with principles of ALARA. FINDINGS: Image quality is limited due to the patient's body habitus as well as mild respiratory motion limiting assessment of the smaller pulmonary arteries.  Pulmonary arteries are adequately opacified without acute or chronic filling defects.  The thoracic aorta is normal in course and caliber without dissection or aneurysm. The heart is markedly enlarged in size without pericardial effusion. Slightly prominent but nonpathologically enlarged mediastinal and bilateral hilar lymph nodes are identified. Trachea and mainstem bronchi are patent and clear of debris.  Pleural parenchymal scarring is noted along the right upper lobe.  There is a subpleural soft tissue density in the lateral basilar segment of the right lower lobe measuring 2.1 x 1.1 cm in diameter on image 172 of series 406 which may represent rounded atelectasis however neoplasm is not entirely excluded.  Atelectasis is seen at the left lung base. There is mild bilateral lower lobe bronchial wall thickening along with mild bronchial wall thickening in the right upper lobe.  Minimal centrilobular emphysema is seen in the bilateral upper lobes. Upper abdomen demonstrates no acute pathology. There are no acute fractures.  No suspicious bony lesions. Generalized osseous demineralization is noted with a slightly pronounced thoracic kyphosis.     Chronic changes suggesting COPD and mild emphysema with bronchial wall thickening in the bilateral lower lobes and right upper lobe which may indicate acute or chronic bronchitis. Subpleural area of nodularity in the lateral aspect of the right lower lobe measuring up to 2.1 cm which may represent rounded  atelectasis however neoplasm is not entirely excluded.  Further evaluation is recommended with nonemergent PET/CT. Right upper lobe pleural parenchymal scarring. Prominent but nonpathologically enlarged mediastinal and bilateral hilar lymph nodes, most likely reactive however these can also be better assessed on PET/CT. Pronounced cardiomegaly. Signed by Luis Alberto Rosario     XR chest 2 views     Result Date: 12/30/2024  STUDY: Chest Radiographs;  12/20/2024, 4:02PM INDICATION: Shortness of breath and hypoxia. COMPARISON: None Available ACCESSION NUMBER(S): [id number] ORDERING CLINICIAN: JO VAZQUEZ TECHNIQUE:  Frontal and lateral chest. FINDINGS: Heart is top normal size. There is central vascular prominence without vascular congestion. No infiltrate is seen. There is minimal right costophrenic angle blunting, without significant pleural effusion on lateral view. There is no pneumothorax. The bony thorax reveals minimal thoracic spine arthritis and moderate kyphosis.     No detectable active cardiopulmonary disease. Remainder as above. Signed by Elton Montero MD         Results for orders placed or performed during the hospital encounter of 12/30/24 (from the past 24 hours)   ECG 12 lead   Result Value Ref Range     Ventricular Rate 68 BPM     Atrial Rate 68 BPM     NM Interval 164 ms     QRS Duration 84 ms     QT Interval 418 ms     QTC Calculation(Bazett) 444 ms     P Axis 67 degrees     R Axis 94 degrees     T Axis 62 degrees     QRS Count 11 beats     Q Onset 220 ms     P Onset 138 ms     P Offset 202 ms     T Offset 429 ms     QTC Fredericia 436 ms   Influenza A, and B PCR   Result Value Ref Range     Flu A Result Not Detected Not Detected     Flu B Result Not Detected Not Detected   Sars-CoV-2 PCR   Result Value Ref Range     Coronavirus 2019, PCR Not Detected Not Detected   RSV PCR   Result Value Ref Range     RSV PCR Not Detected Not Detected   CBC and Auto Differential   Result Value Ref Range     WBC  10.1 4.4 - 11.3 x10*3/uL     nRBC 0.0 0.0 - 0.0 /100 WBCs     RBC 5.23 (H) 4.00 - 5.20 x10*6/uL     Hemoglobin 14.3 12.0 - 16.0 g/dL     Hematocrit 46.7 (H) 36.0 - 46.0 %     MCV 89 80 - 100 fL     MCH 27.3 26.0 - 34.0 pg     MCHC 30.6 (L) 32.0 - 36.0 g/dL     RDW 16.4 (H) 11.5 - 14.5 %     Platelets 266 150 - 450 x10*3/uL     Neutrophils % 66.3 40.0 - 80.0 %     Immature Granulocytes %, Automated 0.2 0.0 - 0.9 %     Lymphocytes % 25.7 13.0 - 44.0 %     Monocytes % 5.3 2.0 - 10.0 %     Eosinophils % 1.8 0.0 - 6.0 %     Basophils % 0.7 0.0 - 2.0 %     Neutrophils Absolute 6.67 1.20 - 7.70 x10*3/uL     Immature Granulocytes Absolute, Automated 0.02 0.00 - 0.70 x10*3/uL     Lymphocytes Absolute 2.59 1.20 - 4.80 x10*3/uL     Monocytes Absolute 0.53 0.10 - 1.00 x10*3/uL     Eosinophils Absolute 0.18 0.00 - 0.70 x10*3/uL     Basophils Absolute 0.07 0.00 - 0.10 x10*3/uL   B-Type Natriuretic Peptide   Result Value Ref Range     BNP 24 0 - 99 pg/mL   Troponin I, High Sensitivity, Initial   Result Value Ref Range     Troponin I, High Sensitivity 7 0 - 13 ng/L   Blood Gas Venous Full Panel   Result Value Ref Range     POCT pH, Venous 7.40 7.33 - 7.43 pH     POCT pCO2, Venous 66 (H) 41 - 51 mm Hg     POCT pO2, Venous 58 (H) 35 - 45 mm Hg     POCT SO2, Venous 90 (H) 45 - 75 %     POCT Oxy Hemoglobin, Venous 86.9 (H) 45.0 - 75.0 %     POCT Hematocrit Calculated, Venous 44.0 36.0 - 46.0 %     POCT Sodium, Venous 137 136 - 145 mmol/L     POCT Potassium, Venous 4.3 3.5 - 5.3 mmol/L     POCT Chloride, Venous 101 98 - 107 mmol/L     POCT Ionized Calicum, Venous 1.37 (H) 1.10 - 1.33 mmol/L     POCT Glucose, Venous 132 (H) 74 - 99 mg/dL     POCT Lactate, Venous 1.5 0.4 - 2.0 mmol/L     POCT Base Excess, Venous 12.8 (H) -2.0 - 3.0 mmol/L     POCT HCO3 Calculated, Venous 40.9 (H) 22.0 - 26.0 mmol/L     POCT Hemoglobin, Venous 14.7 12.0 - 16.0 g/dL     POCT Anion Gap, Venous -1.0 (L) 10.0 - 25.0 mmol/L     Patient Temperature 37.0 degrees  Celsius     FiO2 36 %   Comprehensive metabolic panel   Result Value Ref Range     Glucose 103 (H) 74 - 99 mg/dL     Sodium 139 136 - 145 mmol/L     Potassium 4.0 3.5 - 5.3 mmol/L     Chloride 99 98 - 107 mmol/L     Bicarbonate 33 (H) 21 - 32 mmol/L     Anion Gap 11 10 - 20 mmol/L     Urea Nitrogen 10 6 - 23 mg/dL     Creatinine 0.71 0.50 - 1.05 mg/dL     eGFR >90 >60 mL/min/1.73m*2     Calcium 10.3 8.6 - 10.3 mg/dL     Albumin 3.9 3.4 - 5.0 g/dL     Alkaline Phosphatase 91 33 - 136 U/L     Total Protein 7.6 6.4 - 8.2 g/dL     AST 14 9 - 39 U/L     Bilirubin, Total 0.4 0.0 - 1.2 mg/dL     ALT 13 7 - 45 U/L   Magnesium   Result Value Ref Range     Magnesium 2.24 1.60 - 2.40 mg/dL   Troponin, High Sensitivity, 1 Hour   Result Value Ref Range     Troponin I, High Sensitivity 7 0 - 13 ng/L   POCT GLUCOSE   Result Value Ref Range     POCT Glucose 253 (H) 74 - 99 mg/dL   POCT GLUCOSE   Result Value Ref Range     POCT Glucose 266 (H) 74 - 99 mg/dL            Assessment/Plan  Danelle is a 64-year-old female patient presenting to emergency department for evaluation of cough and congestion.  Patient states that she has a history of COPD and is not on home oxygen, usually uses her nebulizer however her power has been out at home since Thursday.  Patient went to urgent care for evaluation and was found to be hypoxic with an SpO2 of 79%.  Patient was sent to ED for further evaluation.  Patient was placed on high flow oxygen in ED, imaging completed showing chronic changes, negative acute however pulmonary and hilar lymph nodes were found and a PET scan, nonemergent is recommended.  Patient was treated with IV Solu-Medrol and DuoNeb treatments, remains on high flow oxygen, admitted for further medical management.     COPD exacerbation/acute respiratory failure  Inpatient telemetry SDU admission to Dr. Abreu  See imaging results above   Continue high flow oxygen  Nebulizer treatments every 2 hours per RT  Continue Solu-Medrol 40 mg IV  push every 8 hours  Mucinex/Tessalon  Telemetry monitoring  COPD navigator     HTN/diabetes/COPD/hypothyroidism/insomnia/HLD/recurrent cold sores/chronic low back pain/right lower extremity insufficiency  #Chronic conditions  Continue home medications as ordered  Diabetic/cardiac diet  Hold home oral antidiabetic medication  Insulin sliding scale  Hypoglycemia protocol     DVT Ppx  SCDs  Heparin subcutaneous  Out of bed with assistance     Patient fully evaluated and on Airvo 60 L 60% FiO2.  Pulmonary consultation is pending.  Continue aggressive pulmonary hygiene  Patient fully evaluated and plan as above. .  Continue aggressive pulmonary hygiene.  I spent 60 minutes in the professional and overall care of this patient.  Kong Darling MD                    Revision History                 Assessment & Plan  Acute hypoxic respiratory failure (Multi)                  Kong Darling MD

## 2025-01-02 LAB
ALBUMIN SERPL BCP-MCNC: 3.9 G/DL (ref 3.4–5)
ALP SERPL-CCNC: 89 U/L (ref 33–136)
ALT SERPL W P-5'-P-CCNC: 18 U/L (ref 7–45)
ANION GAP SERPL CALC-SCNC: 8 MMOL/L (ref 10–20)
AST SERPL W P-5'-P-CCNC: 20 U/L (ref 9–39)
BILIRUB SERPL-MCNC: 0.3 MG/DL (ref 0–1.2)
BUN SERPL-MCNC: 21 MG/DL (ref 6–23)
CALCIUM SERPL-MCNC: 10.2 MG/DL (ref 8.6–10.3)
CHLORIDE SERPL-SCNC: 98 MMOL/L (ref 98–107)
CO2 SERPL-SCNC: 35 MMOL/L (ref 21–32)
CREAT SERPL-MCNC: 0.72 MG/DL (ref 0.5–1.05)
EGFRCR SERPLBLD CKD-EPI 2021: >90 ML/MIN/1.73M*2
ERYTHROCYTE [DISTWIDTH] IN BLOOD BY AUTOMATED COUNT: 15.9 % (ref 11.5–14.5)
GLUCOSE BLD MANUAL STRIP-MCNC: 262 MG/DL (ref 74–99)
GLUCOSE BLD MANUAL STRIP-MCNC: 287 MG/DL (ref 74–99)
GLUCOSE BLD MANUAL STRIP-MCNC: 289 MG/DL (ref 74–99)
GLUCOSE BLD MANUAL STRIP-MCNC: 355 MG/DL (ref 74–99)
GLUCOSE SERPL-MCNC: 288 MG/DL (ref 74–99)
HCT VFR BLD AUTO: 46.5 % (ref 36–46)
HGB BLD-MCNC: 13.8 G/DL (ref 12–16)
MCH RBC QN AUTO: 27.2 PG (ref 26–34)
MCHC RBC AUTO-ENTMCNC: 29.7 G/DL (ref 32–36)
MCV RBC AUTO: 92 FL (ref 80–100)
NRBC BLD-RTO: 0 /100 WBCS (ref 0–0)
PLATELET # BLD AUTO: 298 X10*3/UL (ref 150–450)
POTASSIUM SERPL-SCNC: 4.4 MMOL/L (ref 3.5–5.3)
PROT SERPL-MCNC: 7.6 G/DL (ref 6.4–8.2)
RBC # BLD AUTO: 5.08 X10*6/UL (ref 4–5.2)
SODIUM SERPL-SCNC: 137 MMOL/L (ref 136–145)
WBC # BLD AUTO: 13.7 X10*3/UL (ref 4.4–11.3)

## 2025-01-02 PROCEDURE — 2500000004 HC RX 250 GENERAL PHARMACY W/ HCPCS (ALT 636 FOR OP/ED): Performed by: INTERNAL MEDICINE

## 2025-01-02 PROCEDURE — 2500000005 HC RX 250 GENERAL PHARMACY W/O HCPCS: Performed by: STUDENT IN AN ORGANIZED HEALTH CARE EDUCATION/TRAINING PROGRAM

## 2025-01-02 PROCEDURE — 2500000001 HC RX 250 WO HCPCS SELF ADMINISTERED DRUGS (ALT 637 FOR MEDICARE OP): Performed by: STUDENT IN AN ORGANIZED HEALTH CARE EDUCATION/TRAINING PROGRAM

## 2025-01-02 PROCEDURE — 2500000002 HC RX 250 W HCPCS SELF ADMINISTERED DRUGS (ALT 637 FOR MEDICARE OP, ALT 636 FOR OP/ED): Performed by: STUDENT IN AN ORGANIZED HEALTH CARE EDUCATION/TRAINING PROGRAM

## 2025-01-02 PROCEDURE — 85027 COMPLETE CBC AUTOMATED: CPT | Performed by: INTERNAL MEDICINE

## 2025-01-02 PROCEDURE — 2500000002 HC RX 250 W HCPCS SELF ADMINISTERED DRUGS (ALT 637 FOR MEDICARE OP, ALT 636 FOR OP/ED): Performed by: NURSE PRACTITIONER

## 2025-01-02 PROCEDURE — 94640 AIRWAY INHALATION TREATMENT: CPT

## 2025-01-02 PROCEDURE — 2500000001 HC RX 250 WO HCPCS SELF ADMINISTERED DRUGS (ALT 637 FOR MEDICARE OP): Performed by: NURSE PRACTITIONER

## 2025-01-02 PROCEDURE — 99222 1ST HOSP IP/OBS MODERATE 55: CPT | Performed by: STUDENT IN AN ORGANIZED HEALTH CARE EDUCATION/TRAINING PROGRAM

## 2025-01-02 PROCEDURE — 2060000001 HC INTERMEDIATE ICU ROOM DAILY

## 2025-01-02 PROCEDURE — 82947 ASSAY GLUCOSE BLOOD QUANT: CPT

## 2025-01-02 PROCEDURE — 36415 COLL VENOUS BLD VENIPUNCTURE: CPT | Performed by: INTERNAL MEDICINE

## 2025-01-02 PROCEDURE — 80053 COMPREHEN METABOLIC PANEL: CPT | Performed by: INTERNAL MEDICINE

## 2025-01-02 PROCEDURE — 2500000004 HC RX 250 GENERAL PHARMACY W/ HCPCS (ALT 636 FOR OP/ED): Performed by: NURSE PRACTITIONER

## 2025-01-02 RX ORDER — LOPERAMIDE HYDROCHLORIDE 2 MG/1
2 CAPSULE ORAL 4 TIMES DAILY PRN
Status: DISCONTINUED | OUTPATIENT
Start: 2025-01-02 | End: 2025-01-06 | Stop reason: HOSPADM

## 2025-01-02 RX ORDER — L. ACIDOPHILUS/L.BULGARICUS 1MM CELL
1 TABLET ORAL DAILY
Status: DISCONTINUED | OUTPATIENT
Start: 2025-01-02 | End: 2025-01-06 | Stop reason: HOSPADM

## 2025-01-02 RX ORDER — IPRATROPIUM BROMIDE AND ALBUTEROL SULFATE 2.5; .5 MG/3ML; MG/3ML
3 SOLUTION RESPIRATORY (INHALATION)
Status: DISCONTINUED | OUTPATIENT
Start: 2025-01-03 | End: 2025-01-06 | Stop reason: HOSPADM

## 2025-01-02 RX ADMIN — Medication 40 PERCENT: at 07:52

## 2025-01-02 RX ADMIN — OXYBUTYNIN CHLORIDE 5 MG: 5 TABLET ORAL at 09:11

## 2025-01-02 RX ADMIN — IPRATROPIUM BROMIDE AND ALBUTEROL SULFATE 3 ML: .5; 3 SOLUTION RESPIRATORY (INHALATION) at 20:33

## 2025-01-02 RX ADMIN — INSULIN LISPRO 10 UNITS: 100 INJECTION, SOLUTION INTRAVENOUS; SUBCUTANEOUS at 16:56

## 2025-01-02 RX ADMIN — HEPARIN SODIUM 7500 UNITS: 5000 INJECTION INTRAVENOUS; SUBCUTANEOUS at 21:26

## 2025-01-02 RX ADMIN — TIOTROPIUM BROMIDE INHALATION SPRAY 2 PUFF: 3.12 SPRAY, METERED RESPIRATORY (INHALATION) at 07:48

## 2025-01-02 RX ADMIN — HEPARIN SODIUM 7500 UNITS: 5000 INJECTION INTRAVENOUS; SUBCUTANEOUS at 05:45

## 2025-01-02 RX ADMIN — GUAIFENESIN 600 MG: 600 TABLET, EXTENDED RELEASE ORAL at 20:18

## 2025-01-02 RX ADMIN — QUETIAPINE FUMARATE 50 MG: 25 TABLET ORAL at 20:18

## 2025-01-02 RX ADMIN — Medication 1 TABLET: at 09:11

## 2025-01-02 RX ADMIN — LEVOTHYROXINE SODIUM 150 MCG: 0.15 TABLET ORAL at 05:45

## 2025-01-02 RX ADMIN — AZITHROMYCIN MONOHYDRATE 500 MG: 500 INJECTION, POWDER, LYOPHILIZED, FOR SOLUTION INTRAVENOUS at 12:45

## 2025-01-02 RX ADMIN — INSULIN LISPRO 6 UNITS: 100 INJECTION, SOLUTION INTRAVENOUS; SUBCUTANEOUS at 11:48

## 2025-01-02 RX ADMIN — FLUTICASONE FUROATE AND VILANTEROL TRIFENATATE 1 PUFF: 100; 25 POWDER RESPIRATORY (INHALATION) at 07:53

## 2025-01-02 RX ADMIN — ROSUVASTATIN CALCIUM 40 MG: 10 TABLET, FILM COATED ORAL at 09:11

## 2025-01-02 RX ADMIN — DULOXETINE HYDROCHLORIDE 30 MG: 30 CAPSULE, DELAYED RELEASE ORAL at 20:18

## 2025-01-02 RX ADMIN — METHYLPREDNISOLONE SODIUM SUCCINATE 40 MG: 40 INJECTION, POWDER, FOR SOLUTION INTRAMUSCULAR; INTRAVENOUS at 11:48

## 2025-01-02 RX ADMIN — LOPERAMIDE HYDROCHLORIDE 2 MG: 2 CAPSULE ORAL at 12:45

## 2025-01-02 RX ADMIN — Medication 5 L/MIN: at 20:26

## 2025-01-02 RX ADMIN — INSULIN LISPRO 6 UNITS: 100 INJECTION, SOLUTION INTRAVENOUS; SUBCUTANEOUS at 07:53

## 2025-01-02 RX ADMIN — GUAIFENESIN 600 MG: 600 TABLET, EXTENDED RELEASE ORAL at 09:11

## 2025-01-02 RX ADMIN — LISINOPRIL 5 MG: 5 TABLET ORAL at 09:11

## 2025-01-02 RX ADMIN — OXYBUTYNIN CHLORIDE 5 MG: 5 TABLET ORAL at 20:18

## 2025-01-02 RX ADMIN — METHYLPREDNISOLONE SODIUM SUCCINATE 40 MG: 40 INJECTION, POWDER, FOR SOLUTION INTRAMUSCULAR; INTRAVENOUS at 04:51

## 2025-01-02 RX ADMIN — HEPARIN SODIUM 7500 UNITS: 5000 INJECTION INTRAVENOUS; SUBCUTANEOUS at 14:52

## 2025-01-02 RX ADMIN — PANTOPRAZOLE SODIUM 40 MG: 40 TABLET, DELAYED RELEASE ORAL at 05:45

## 2025-01-02 ASSESSMENT — COGNITIVE AND FUNCTIONAL STATUS - GENERAL
DAILY ACTIVITIY SCORE: 24
MOBILITY SCORE: 24

## 2025-01-02 ASSESSMENT — PAIN SCALES - GENERAL: PAINLEVEL_OUTOF10: 0 - NO PAIN

## 2025-01-02 NOTE — PROGRESS NOTES
"Danelle Jacobs is a 64 y.o. female on day 2 of admission presenting with Acute hypoxic respiratory failure (Multi).    Subjective   Still wheezign on 5 liter, feeling better       Objective     Physical Exam  Constitutional:       Appearance: Normal appearance.   HENT:      Head: Normocephalic and atraumatic.      Right Ear: Tympanic membrane and ear canal normal.      Left Ear: Tympanic membrane and ear canal normal.      Mouth/Throat:      Mouth: Mucous membranes are moist.      Pharynx: Oropharynx is clear.   Eyes:      Extraocular Movements: Extraocular movements intact.      Conjunctiva/sclera: Conjunctivae normal.      Pupils: Pupils are equal, round, and reactive to light.   Cardiovascular:      Rate and Rhythm: Normal rate and regular rhythm.      Pulses: Normal pulses.      Heart sounds: Normal heart sounds.   Pulmonary:      Effort: Pulmonary effort is normal.      Breath sounds: Normal breath sounds.   Abdominal:      General: Abdomen is flat. Bowel sounds are normal.      Palpations: Abdomen is soft.   Musculoskeletal:         General: Normal range of motion.      Cervical back: Normal range of motion and neck supple.   Skin:     General: Skin is warm and dry.      Capillary Refill: Capillary refill takes 2 to 3 seconds.   Neurological:      General: No focal deficit present.      Mental Status: She is alert and oriented to person, place, and time. Mental status is at baseline.   Psychiatric:         Mood and Affect: Mood normal.         Behavior: Behavior normal.         Thought Content: Thought content normal.         Judgment: Judgment normal.         Last Recorded Vitals  Blood pressure 131/63, pulse 63, temperature 36.6 °C (97.9 °F), temperature source Temporal, resp. rate 16, height 1.702 m (5' 7\"), weight 120 kg (265 lb), SpO2 93%.  Intake/Output last 3 Shifts:  I/O last 3 completed shifts:  In: 250 (2.1 mL/kg) [IV Piggyback:250]  Out: - (0 mL/kg)   Dosing Weight: 120 kg     Relevant " Results            This patient currently has cardiac telemetry ordered; if you would like to modify or discontinue the telemetry order, click here to go to the orders activity to modify/discontinue the order.                 Assessment/Plan   Assessment & Plan  Acute hypoxic respiratory failure (Multi)    Danelle is a 64-year-old female patient presenting to emergency department for evaluation of cough and congestion.  Patient states that she has a history of COPD and is not on home oxygen, usually uses her nebulizer however her power has been out at home since Thursday.  Patient went to urgent care for evaluation and was found to be hypoxic with an SpO2 of 79%.  Patient was sent to ED for further evaluation.  Patient was placed on high flow oxygen in ED, imaging completed showing chronic changes, negative acute however pulmonary and hilar lymph nodes were found and a PET scan, nonemergent is recommended.  Patient was treated with IV Solu-Medrol and DuoNeb treatments, remains on high flow oxygen, admitted for further medical management.     COPD exacerbation/acute respiratory failure  Inpatient telemetry SDU admission to Dr. Abreu  See imaging results above   Continue high flow oxygen  Nebulizer treatments every 2 hours per RT  Continue Solu-Medrol 40 mg IV push every 8 hours  Mucinex/Tessalon  Telemetry monitoring  COPD navigator     HTN/diabetes/COPD/hypothyroidism/insomnia/HLD/recurrent cold sores/chronic low back pain/right lower extremity insufficiency  #Chronic conditions  Continue home medications as ordered  Diabetic/cardiac diet  Hold home oral antidiabetic medication  Insulin sliding scale  Hypoglycemia protocol     DVT Ppx  SCDs  Heparin subcutaneous  Out of bed with assistance     Patient fully evaluated and on Airvo 60 L 60% FiO2.  Pulmonary consultation is pending.  Continue aggressive pulmonary hygiene  Patient fully evaluated and plan as above. .  Continue aggressive pulmonary hygiene.    1/2: doing  well , feeling better, still wheezing, on 5 liters still, contineu steroids       I spent 35 minutes in the professional and overall care of this patient.      Junior Abreu, DO

## 2025-01-02 NOTE — CARE PLAN
The patient's goals for the shift include  patient will have improved pulmonary hygiene    The clinical goals for the shift include patient will have improved pulmonary hygiene     Over the shift, the patient did not make progress toward the following goals. Barriers to progression include . Recommendations to address these barriers include .

## 2025-01-02 NOTE — PROGRESS NOTES
01/02/25 0952   Discharge Planning   Living Arrangements Family members   Support Systems Family members   Assistance Needed independent   Type of Residence Private residence  (ranch style home)   Number of Stairs to Enter Residence 3   Home or Post Acute Services None   Expected Discharge Disposition Home   Intensity of Service   Intensity of Service 0-30 min     Care transitions at bedside to complete assessment.  TCC introduced self and explained role.  Patient demographics verified.  Pt stated that she lives with son and granddaughter.  Denies using assistive devices or home oxygen.  Stated that she has been independent and driving prior to coming to the hospital.  Patient stated that she is a diabetic and has a glucometer at home.  Care transitions to follow.      PCP: Dr Abreu (pt stated that she saw Dr Zaid Doolye Dec 10 and he was able to fill her prescriptions)  Insurance: [facility]  Pharmacy:  [facility] on [address] and [address]

## 2025-01-02 NOTE — CARE PLAN
The patient's goals for the shift include      The clinical goals for the shift include      Over the shift, the patient did not make progress toward the following goals. Barriers to progression include . Recommendations to address these barriers include .    Problem: Pain - Adult  Goal: Verbalizes/displays adequate comfort level or baseline comfort level  Outcome: Not Progressing     Problem: Safety - Adult  Goal: Free from fall injury  Outcome: Not Progressing     Problem: Discharge Planning  Goal: Discharge to home or other facility with appropriate resources  Outcome: Not Progressing     Problem: Chronic Conditions and Co-morbidities  Goal: Patient's chronic conditions and co-morbidity symptoms are monitored and maintained or improved  Outcome: Not Progressing     Problem: Respiratory  Goal: Clear secretions with interventions this shift  Outcome: Not Progressing  Goal: Minimize anxiety/maximize coping throughout shift  Outcome: Not Progressing  Goal: Minimal/no exertional discomfort or dyspnea this shift  Outcome: Not Progressing  Goal: No signs of respiratory distress (eg. Use of accessory muscles. Peds grunting)  Outcome: Not Progressing  Goal: Patent airway maintained this shift  Outcome: Not Progressing  Goal: Tolerate pulmonary toileting this shift  Outcome: Not Progressing  Goal: Verbalize decreased shortness of breath this shift  Outcome: Not Progressing  Goal: Wean oxygen to maintain O2 saturation per order/standard this shift  Outcome: Not Progressing  Goal: Increase self care and/or family involvement in next 24 hours  Outcome: Not Progressing

## 2025-01-02 NOTE — CONSULTS
Pulmonary Critical Care note    Patient Name :Danelle Jacobs  Date of service : 01/02/25  MRN: [id number]  YOB: 1960      Interval History:   1/2/2025: Patient is resting in bed, appears comfortable. She is on 5L O2 and reports poor compliance with her home breathing treatment.    History of Present Illness: Danelle Jacobs is a 64 y.o. female presenting to the emergency department for evaluation of cough and congestion.  Patient went to urgent care yesterday afternoon at approximately 3 PM and was found to have a low pulse ox of 79%.  Patient was sent by urgent care to emergency department for further evaluation.  Patient admits to history of COPD and states she is not normally on oxygen.  Patient states that she typically uses her nebulizer at home but has been unable to since she has been without power since Thursday.  Patient admits to a productive cough with yellow, greenish sputum and notes sinus drainage, runny nose, congestion, and intermittent shortness of breath that is worse with exertion.  Patient denies chest pain or dizziness.  Patient denies nausea, vomiting, diarrhea, fever, chills, abdominal, or urinary symptoms.  Patient was placed on high flow oxygen after she was moved to a room in the emergency department.       64 y.o. female  on day  2 of admission presenting with Acute hypoxic respiratory failure (Multi).  Past Medical/Surgical History:    has a past medical history of Bronchopneumonia, unspecified organism, Cellulitis of right lower limb, COPD (chronic obstructive pulmonary disease) (Multi), Diabetes mellitus (Multi), Disease of thyroid gland, Fatty (change of) liver, not elsewhere classified, Hypertension, Localized edema (03/04/2015), Other long term (current) drug therapy, Other malaise, Otitis media, unspecified, left ear, Personal history of diseases of the skin and subcutaneous tissue, Personal history of other diseases of the circulatory system, Personal history of  other diseases of the female genital tract, Personal history of other diseases of the nervous system and sense organs, Personal history of other diseases of the nervous system and sense organs, Personal history of other diseases of the respiratory system, Personal history of other diseases of the respiratory system, Personal history of other diseases of the respiratory system, Personal history of other endocrine, nutritional and metabolic disease, Personal history of other endocrine, nutritional and metabolic disease, Personal history of other infectious and parasitic diseases, and Personal history of other mental and behavioral disorders.   has a past surgical history that includes Hernia repair (03/04/2015); Other surgical history (03/04/2015); Gallbladder surgery (03/04/2015); Other surgical history (03/04/2015); Cholecystectomy (12/31/2015); Foot surgery (12/31/2015); and Appendectomy (05/07/2015).   reports that she quit smoking about 7 years ago. Her smoking use included cigarettes. She has never used smokeless tobacco. She reports that she does not currently use alcohol. She reports that she does not use drugs.  Family History:   No relevant family history has been documented for this patient.    Allergies:     Patient has no known allergies.      Social history:   reports that she quit smoking about 7 years ago. Her smoking use included cigarettes. She has never used smokeless tobacco. She reports that she does not currently use alcohol. She reports that she does not use drugs.      Review of Systems:   General: denies weight gain, denies loss of appetite, fever, chills, night sweats.  HEENT: denies headaches, dizziness, head trauma, visual changes, eye pain, tinnitus, nosebleeds, hoarseness or throat pain    Respiratory: denies chest pain, dyspnea, cough and hemoptysis  Cardiovascular: denies orthopnea, paroxysmal nocturnal dyspnea, leg swelling, and previous heart attack.    Gastrointestinal: denies pain,  nausea vomiting, diarrhea, constipation, melena or bleeding.  Genitourinary: denies hematuria, frequency, urgency or dysuria  Neurology: denies syncope, seizures, paralysis, paraesthesia   Endocrine: denies polyuria, polydipsia, skin or hair changes, and heat or cold intolerance  Musculoskeletal: denies joint pain, swelling, arthritis or myalgia  Hematologic: denies bleeding, adenopathy and easy bruising  Skin: denies rashes and skin discoloration  Psychiatry: denies depression    Physical Exam:   Vital Signs:   Vitals:    01/02/25 1125   BP: 122/58   Pulse: 60   Resp: 18   Temp: 35.9 °C (96.6 °F)   SpO2: 94%     Vitals:    12/30/24 1532   Weight: 120 kg (265 lb)         Input/Output:    Intake/Output Summary (Last 24 hours) at 1/2/2025 1347  Last data filed at 1/2/2025 1245  Gross per 24 hour   Intake 250 ml   Output --   Net 250 ml       Oxygen requirements:    Ventilator Information:  FiO2 (%):  [40 %] 40 %  Oxygen Therapy/Pulse Ox  Medical Gas Therapy: Supplemental oxygen  Medical Gas Delivery Method: Nasal cannula  FiO2 (%): 40 %  SpO2: 94 %  Patient Activity During SpO2 Measurement: At rest  Temp: 35.9 °C (96.6 °F)        General appearance: Not in pain or distress, in no respiratory distress    HEENT: Atraumatic/normocephalic, EOMI, DEBI, pharynx clear, moist mucosa, redness of the uvula appreciated,   Neck: Supple, no jugular venous distension, lymphadenopathy, thyromegaly or carotid bruits  Chest: Decreased breath sounds  Cardiovascular: Normal S1, S2, regular rate and rhythm, no murmur, rub or gallop  Abdomen: Normal sounds present, soft, lax with no tenderness, no hepatosplenomegaly, and no masses  Extremities: No edema. Pulses are equally present.   Skin: intact, no rashes   Neurologic: Alert and oriented x 3, No focal deficit         Current Medications:   Scheduled medications  azithromycin, 500 mg, intravenous, q24h  DULoxetine, 30 mg, oral, Nightly  tiotropium, 2 puff, inhalation, Daily    And  fluticasone furoate-vilanteroL, 1 puff, inhalation, Daily  guaiFENesin, 600 mg, oral, BID  heparin (porcine), 7,500 Units, subcutaneous, q8h MALIHA  insulin lispro, 0-10 Units, subcutaneous, TID AC  lactobacillus acidophilus, 1 tablet, oral, Daily  levothyroxine, 150 mcg, oral, Daily  lisinopril, 5 mg, oral, Daily  [START ON 1/3/2025] methylPREDNISolone sodium succinate (PF), 40 mg, intravenous, q24h  oxybutynin, 5 mg, oral, BID  oxygen, , inhalation, Continuous - Inhalation  pantoprazole, 40 mg, oral, Daily  QUEtiapine, 50 mg, oral, Nightly  rosuvastatin, 40 mg, oral, Daily      Continuous medications     PRN medications  PRN medications: ammonium lactate, benzonatate, cyclobenzaprine, dextrose, dextrose, furosemide, glucagon, glucagon, ipratropium-albuteroL, loperamide, valACYclovir      Investigations:  Labs, radiological imaging and cardiac work up were personally reviewed    Results from last 7 days   Lab Units 01/02/25  0554 01/01/25  0602 12/30/24  2338   SODIUM mmol/L 137 137 139   POTASSIUM mmol/L 4.4 4.0 4.0   CHLORIDE mmol/L 98 102 99   CO2 mmol/L 35* 30 33*   BUN mg/dL 21 19 10   CREATININE mg/dL 0.72 0.79 0.71   GLUCOSE mg/dL 288* 277* 103*   CALCIUM mg/dL 10.2 9.9 10.3     Results from last 7 days   Lab Units 01/02/25  0554   WBC AUTO x10*3/uL 13.7*   HEMOGLOBIN g/dL 13.8   HEMATOCRIT % 46.5*   PLATELETS AUTO x10*3/uL 298         ECG 12 lead    Result Date: 12/31/2024  Normal sinus rhythm Rightward axis Nonspecific ST abnormality Abnormal ECG No previous ECGs available    CT angio chest for pulmonary embolism    Result Date: 12/31/2024  STUDY: CT Angiogram of the Chest; 12/31/2024 2:29 AM INDICATION: Hypoxia.  Tachycardia. COMPARISON: CXR 12/30/2024. ACCESSION NUMBER(S): [id number] ORDERING CLINICIAN: JO VAZQUEZ TECHNIQUE:  CTA of the chest was performed with intravenous contrast. Images are reviewed and processed at a workstation according to the CT angiogram protocol with 3-D and/or MIP  post processing imaging generated.  Omnipaque 350 80 mL was administered intravenously. Automated mA/kV exposure control was utilized and patient examination was performed in strict accordance with principles of ALARA. FINDINGS: Image quality is limited due to the patient's body habitus as well as mild respiratory motion limiting assessment of the smaller pulmonary arteries.  Pulmonary arteries are adequately opacified without acute or chronic filling defects.  The thoracic aorta is normal in course and caliber without dissection or aneurysm. The heart is markedly enlarged in size without pericardial effusion. Slightly prominent but nonpathologically enlarged mediastinal and bilateral hilar lymph nodes are identified. Trachea and mainstem bronchi are patent and clear of debris.  Pleural parenchymal scarring is noted along the right upper lobe.  There is a subpleural soft tissue density in the lateral basilar segment of the right lower lobe measuring 2.1 x 1.1 cm in diameter on image 172 of series 406 which may represent rounded atelectasis however neoplasm is not entirely excluded.  Atelectasis is seen at the left lung base. There is mild bilateral lower lobe bronchial wall thickening along with mild bronchial wall thickening in the right upper lobe.  Minimal centrilobular emphysema is seen in the bilateral upper lobes. Upper abdomen demonstrates no acute pathology. There are no acute fractures.  No suspicious bony lesions. Generalized osseous demineralization is noted with a slightly pronounced thoracic kyphosis.    Chronic changes suggesting COPD and mild emphysema with bronchial wall thickening in the bilateral lower lobes and right upper lobe which may indicate acute or chronic bronchitis. Subpleural area of nodularity in the lateral aspect of the right lower lobe measuring up to 2.1 cm which may represent rounded atelectasis however neoplasm is not entirely excluded.  Further evaluation is recommended with  nonemergent PET/CT. Right upper lobe pleural parenchymal scarring. Prominent but nonpathologically enlarged mediastinal and bilateral hilar lymph nodes, most likely reactive however these can also be better assessed on PET/CT. Pronounced cardiomegaly. Signed by Luis Alberto Rosario    XR chest 2 views    Result Date: 12/30/2024  STUDY: Chest Radiographs;  12/20/2024, 4:02PM INDICATION: Shortness of breath and hypoxia. COMPARISON: None Available ACCESSION NUMBER(S): [id number] ORDERING CLINICIAN: JO VAZQUEZ TECHNIQUE:  Frontal and lateral chest. FINDINGS: Heart is top normal size. There is central vascular prominence without vascular congestion. No infiltrate is seen. There is minimal right costophrenic angle blunting, without significant pleural effusion on lateral view. There is no pneumothorax. The bony thorax reveals minimal thoracic spine arthritis and moderate kyphosis.    No detectable active cardiopulmonary disease. Remainder as above. Signed by Elton Montero MD      Assessment  Danelle Jacobs IS 64 y.o. female  on day  2 of admission presenting with Acute hypoxic respiratory failure (Multi). Actively being treated for the  following medical Problems:    Suspected obesity hypoventilation syndrome  Acute hypoxic respiratory failure  Opacity c/f atelectasis vs neoplasm  COPD    Recommendation:  Given the patient's body habitus and VBG, OHS is suspected. She has never been formally diagnosed with OHS or LUISA, sleep study recommended outpatient.  Patient reports that she is not compliant with her home Trelegy, is recommended to adhere to her current home breathing treatments following discharge.  Solu-medrol decreased from q8h to once daily. Wean oxygen appropriately, baseline is room air.  Patient reports having fallen on her side in the past which may explain findings suggestive of right-sided chest trauma on CXR. Repeat CT chest in 4-6 weeks to follow up on 2.1 cm area of nodularity. If no resolution is  seen, consider biopsy.      Mark Barnes MD  PGY-1  01/02/25  1:47 PM    This is a preliminary note written by the resident. Please wait for attending addendum for finalization of note and recommendations.

## 2025-01-03 LAB
GLUCOSE BLD MANUAL STRIP-MCNC: 179 MG/DL (ref 74–99)
GLUCOSE BLD MANUAL STRIP-MCNC: 185 MG/DL (ref 74–99)
GLUCOSE BLD MANUAL STRIP-MCNC: 238 MG/DL (ref 74–99)
GLUCOSE BLD MANUAL STRIP-MCNC: 371 MG/DL (ref 74–99)

## 2025-01-03 PROCEDURE — 2500000004 HC RX 250 GENERAL PHARMACY W/ HCPCS (ALT 636 FOR OP/ED): Performed by: INTERNAL MEDICINE

## 2025-01-03 PROCEDURE — 9420000001 HC RT PATIENT EDUCATION 5 MIN

## 2025-01-03 PROCEDURE — 2500000004 HC RX 250 GENERAL PHARMACY W/ HCPCS (ALT 636 FOR OP/ED)

## 2025-01-03 PROCEDURE — 94640 AIRWAY INHALATION TREATMENT: CPT

## 2025-01-03 PROCEDURE — 2060000001 HC INTERMEDIATE ICU ROOM DAILY

## 2025-01-03 PROCEDURE — 82947 ASSAY GLUCOSE BLOOD QUANT: CPT

## 2025-01-03 PROCEDURE — 2500000004 HC RX 250 GENERAL PHARMACY W/ HCPCS (ALT 636 FOR OP/ED): Performed by: NURSE PRACTITIONER

## 2025-01-03 PROCEDURE — 2500000002 HC RX 250 W HCPCS SELF ADMINISTERED DRUGS (ALT 637 FOR MEDICARE OP, ALT 636 FOR OP/ED): Performed by: STUDENT IN AN ORGANIZED HEALTH CARE EDUCATION/TRAINING PROGRAM

## 2025-01-03 PROCEDURE — 2500000002 HC RX 250 W HCPCS SELF ADMINISTERED DRUGS (ALT 637 FOR MEDICARE OP, ALT 636 FOR OP/ED): Performed by: NURSE PRACTITIONER

## 2025-01-03 PROCEDURE — 2500000005 HC RX 250 GENERAL PHARMACY W/O HCPCS: Performed by: STUDENT IN AN ORGANIZED HEALTH CARE EDUCATION/TRAINING PROGRAM

## 2025-01-03 PROCEDURE — 99232 SBSQ HOSP IP/OBS MODERATE 35: CPT | Performed by: STUDENT IN AN ORGANIZED HEALTH CARE EDUCATION/TRAINING PROGRAM

## 2025-01-03 PROCEDURE — 2500000001 HC RX 250 WO HCPCS SELF ADMINISTERED DRUGS (ALT 637 FOR MEDICARE OP): Performed by: NURSE PRACTITIONER

## 2025-01-03 PROCEDURE — 2500000001 HC RX 250 WO HCPCS SELF ADMINISTERED DRUGS (ALT 637 FOR MEDICARE OP): Performed by: STUDENT IN AN ORGANIZED HEALTH CARE EDUCATION/TRAINING PROGRAM

## 2025-01-03 RX ADMIN — LEVOTHYROXINE SODIUM 150 MCG: 0.15 TABLET ORAL at 05:32

## 2025-01-03 RX ADMIN — QUETIAPINE FUMARATE 50 MG: 25 TABLET ORAL at 20:13

## 2025-01-03 RX ADMIN — INSULIN LISPRO 2 UNITS: 100 INJECTION, SOLUTION INTRAVENOUS; SUBCUTANEOUS at 11:59

## 2025-01-03 RX ADMIN — Medication 1 TABLET: at 08:56

## 2025-01-03 RX ADMIN — Medication 5 L/MIN: at 20:12

## 2025-01-03 RX ADMIN — BENZONATATE 100 MG: 100 CAPSULE ORAL at 05:32

## 2025-01-03 RX ADMIN — IPRATROPIUM BROMIDE AND ALBUTEROL SULFATE 3 ML: .5; 3 SOLUTION RESPIRATORY (INHALATION) at 07:15

## 2025-01-03 RX ADMIN — OXYBUTYNIN CHLORIDE 5 MG: 5 TABLET ORAL at 08:56

## 2025-01-03 RX ADMIN — ROSUVASTATIN CALCIUM 40 MG: 10 TABLET, FILM COATED ORAL at 08:56

## 2025-01-03 RX ADMIN — IPRATROPIUM BROMIDE AND ALBUTEROL SULFATE 3 ML: .5; 3 SOLUTION RESPIRATORY (INHALATION) at 12:31

## 2025-01-03 RX ADMIN — OXYBUTYNIN CHLORIDE 5 MG: 5 TABLET ORAL at 20:13

## 2025-01-03 RX ADMIN — Medication 38 PERCENT: at 07:12

## 2025-01-03 RX ADMIN — INSULIN LISPRO 2 UNITS: 100 INJECTION, SOLUTION INTRAVENOUS; SUBCUTANEOUS at 08:57

## 2025-01-03 RX ADMIN — HEPARIN SODIUM 7500 UNITS: 5000 INJECTION INTRAVENOUS; SUBCUTANEOUS at 05:32

## 2025-01-03 RX ADMIN — TIOTROPIUM BROMIDE INHALATION SPRAY 2 PUFF: 3.12 SPRAY, METERED RESPIRATORY (INHALATION) at 07:14

## 2025-01-03 RX ADMIN — GUAIFENESIN 600 MG: 600 TABLET, EXTENDED RELEASE ORAL at 08:56

## 2025-01-03 RX ADMIN — HEPARIN SODIUM 7500 UNITS: 5000 INJECTION INTRAVENOUS; SUBCUTANEOUS at 13:36

## 2025-01-03 RX ADMIN — LOPERAMIDE HYDROCHLORIDE 2 MG: 2 CAPSULE ORAL at 11:38

## 2025-01-03 RX ADMIN — AZITHROMYCIN MONOHYDRATE 500 MG: 500 INJECTION, POWDER, LYOPHILIZED, FOR SOLUTION INTRAVENOUS at 12:25

## 2025-01-03 RX ADMIN — PANTOPRAZOLE SODIUM 40 MG: 40 TABLET, DELAYED RELEASE ORAL at 05:32

## 2025-01-03 RX ADMIN — FLUTICASONE FUROATE AND VILANTEROL TRIFENATATE 1 PUFF: 100; 25 POWDER RESPIRATORY (INHALATION) at 07:12

## 2025-01-03 RX ADMIN — LISINOPRIL 5 MG: 5 TABLET ORAL at 08:56

## 2025-01-03 RX ADMIN — GUAIFENESIN 600 MG: 600 TABLET, EXTENDED RELEASE ORAL at 20:14

## 2025-01-03 RX ADMIN — METHYLPREDNISOLONE SODIUM SUCCINATE 40 MG: 40 INJECTION, POWDER, FOR SOLUTION INTRAMUSCULAR; INTRAVENOUS at 12:29

## 2025-01-03 RX ADMIN — HEPARIN SODIUM 7500 UNITS: 5000 INJECTION INTRAVENOUS; SUBCUTANEOUS at 20:13

## 2025-01-03 RX ADMIN — INSULIN LISPRO 4 UNITS: 100 INJECTION, SOLUTION INTRAVENOUS; SUBCUTANEOUS at 17:09

## 2025-01-03 RX ADMIN — IPRATROPIUM BROMIDE AND ALBUTEROL SULFATE 3 ML: .5; 3 SOLUTION RESPIRATORY (INHALATION) at 17:02

## 2025-01-03 RX ADMIN — DULOXETINE HYDROCHLORIDE 30 MG: 30 CAPSULE, DELAYED RELEASE ORAL at 20:13

## 2025-01-03 RX ADMIN — Medication 5 L/MIN: at 17:03

## 2025-01-03 ASSESSMENT — PAIN - FUNCTIONAL ASSESSMENT
PAIN_FUNCTIONAL_ASSESSMENT: 0-10
PAIN_FUNCTIONAL_ASSESSMENT: 0-10

## 2025-01-03 ASSESSMENT — COGNITIVE AND FUNCTIONAL STATUS - GENERAL
DAILY ACTIVITIY SCORE: 24
MOBILITY SCORE: 24
DAILY ACTIVITIY SCORE: 24
MOBILITY SCORE: 24

## 2025-01-03 ASSESSMENT — PAIN SCALES - GENERAL
PAINLEVEL_OUTOF10: 0 - NO PAIN

## 2025-01-03 NOTE — CARE PLAN
The clinical goals for the shift include wean oxygen as tolerated.    Problem: Safety - Adult  Goal: Free from fall injury  Outcome: Progressing     Problem: Respiratory  Goal: Minimize anxiety/maximize coping throughout shift  Outcome: Progressing  Goal: Wean oxygen to maintain O2 saturation per order/standard this shift  Outcome: Progressing

## 2025-01-03 NOTE — NURSING NOTE
Pulmonary Disease Navigator Documentation:    Pulmonary disease education was performed by the Respiratory Therapist with a good understanding: yes  Home oxygen: none prior to admission.  Found patient on 4.5 lpm nc weaned patient to 2 lpm nc throughout education and patient maintained SPO2 92%.  RN, made aware.  Patient may benefit from home oxygen evaluation prior to discharge.  COPD triggers discussed and when to notify physician? yes  COPD Education booklet given to patient with education? no  Benefits of participating in a Pulmonary Rehab program discussed: Yes  Pulmonary Rehab Referral written? Patient declined referral at this time, stating she would like to see if her sister would attend with her.  Program pamphlet provided to patient and patient encouraged to discuss with physician at follow-up  Home medication usage/spacer education done? Yes, spacer provided at this time.  Combivent, Duoneb, Trelegy.  Pursed lip breathing education done? yes  Vaccination status reviewed?  Yes  No PFTs on file.    Comments: Patient stated she had been following with her PCP, Dr. Dooley, for her outpatient pulmonary needs and now plans on following with Dr. Abreu.  Patient stated she had not been taking her Trelegy inhaler regularly.  Discussed differences between maintenance medications and rescue medications and importance of both.  Patient verbalized understanding and stated she plans to begin taking her Trelegy regularly at discharge.  Patient denied any need for respiratory medication refills or assistance making follow-up appointments at this time.  Navigator contact information provided and patient encouraged to call with any questions post discharge.

## 2025-01-03 NOTE — PROGRESS NOTES
Pulmonary Critical Care note    Patient Name :Danelle Jacobs  Date of service : 01/03/25  MRN: [id number]  YOB: 1960      Interval History:  1/2/2025: Patient is resting in bed, appears comfortable. She is on 5L O2 and reports poor compliance with her home breathing treatment.  1/3/2025: Patient resting in bed, she says that her breathing feels improved. She is on 4L O2 on exam.    History of Present Illness: Danelle Jacobs is a 64 y.o. female presenting to the emergency department for evaluation of cough and congestion. Patient went to urgent care yesterday afternoon at approximately 3 PM and was found to have a low pulse ox of 79%. Patient was sent by urgent care to emergency department for further evaluation. Patient admits to history of COPD and states she is not normally on oxygen. Patient states that she typically uses her nebulizer at home but has been unable to since she has been without power since Thursday. Patient admits to a productive cough with yellow, greenish sputum and notes sinus drainage, runny nose, congestion, and intermittent shortness of breath that is worse with exertion. Patient denies chest pain or dizziness. Patient denies nausea, vomiting, diarrhea, fever, chills, abdominal, or urinary symptoms. Patient was placed on high flow oxygen after she was moved to a room in the emergency department.      64 y.o. female  on day  3 of admission presenting with Acute hypoxic respiratory failure (Multi).  Past Medical/Surgical History:    has a past medical history of Bronchopneumonia, unspecified organism, Cellulitis of right lower limb, COPD (chronic obstructive pulmonary disease) (Multi), Diabetes mellitus (Multi), Disease of thyroid gland, Fatty (change of) liver, not elsewhere classified, Hypertension, Localized edema (03/04/2015), Other long term (current) drug therapy, Other malaise, Otitis media, unspecified, left ear, Personal history of diseases of the skin and  subcutaneous tissue, Personal history of other diseases of the circulatory system, Personal history of other diseases of the female genital tract, Personal history of other diseases of the nervous system and sense organs, Personal history of other diseases of the nervous system and sense organs, Personal history of other diseases of the respiratory system, Personal history of other diseases of the respiratory system, Personal history of other diseases of the respiratory system, Personal history of other endocrine, nutritional and metabolic disease, Personal history of other endocrine, nutritional and metabolic disease, Personal history of other infectious and parasitic diseases, and Personal history of other mental and behavioral disorders.   has a past surgical history that includes Hernia repair (03/04/2015); Other surgical history (03/04/2015); Gallbladder surgery (03/04/2015); Other surgical history (03/04/2015); Cholecystectomy (12/31/2015); Foot surgery (12/31/2015); and Appendectomy (05/07/2015).   reports that she quit smoking about 7 years ago. Her smoking use included cigarettes. She has never used smokeless tobacco. She reports that she does not currently use alcohol. She reports that she does not use drugs.  Family History:   No relevant family history has been documented for this patient.    Allergies:     Patient has no known allergies.      Social history:   reports that she quit smoking about 7 years ago. Her smoking use included cigarettes. She has never used smokeless tobacco. She reports that she does not currently use alcohol. She reports that she does not use drugs.      Review of Systems:   General: denies weight gain, denies loss of appetite, fever, chills, night sweats.  HEENT: denies headaches, dizziness, head trauma, visual changes, eye pain, tinnitus, nosebleeds, hoarseness or throat pain    Respiratory: denies chest pain, dyspnea, cough and hemoptysis  Cardiovascular: denies orthopnea,  paroxysmal nocturnal dyspnea, leg swelling, and previous heart attack.    Gastrointestinal: denies pain, nausea vomiting, diarrhea, constipation, melena or bleeding.  Genitourinary: denies hematuria, frequency, urgency or dysuria  Neurology: denies syncope, seizures, paralysis, paraesthesia   Endocrine: denies polyuria, polydipsia, skin or hair changes, and heat or cold intolerance  Musculoskeletal: denies joint pain, swelling, arthritis or myalgia  Hematologic: denies bleeding, adenopathy and easy bruising  Skin: denies rashes and skin discoloration  Psychiatry: denies depression    Physical Exam:   Vital Signs:   Vitals:    01/03/25 1231   BP:    Pulse:    Resp:    Temp:    SpO2: 96%     Vitals:    12/30/24 1532   Weight: 120 kg (265 lb)         Input/Output:  No intake or output data in the 24 hours ending 01/03/25 1322    Oxygen requirements:    Ventilator Information:  FiO2 (%):  [38 %-40 %] 38 %  Oxygen Therapy/Pulse Ox  Medical Gas Therapy: Supplemental oxygen  Medical Gas Delivery Method: Nasal cannula  FiO2 (%): 38 % (4.5 L/min)  SpO2: 96 %  Patient Activity During SpO2 Measurement: At rest  Temp: 35.9 °C (96.6 °F)        General appearance: Not in pain or distress, in no respiratory distress    HEENT: Atraumatic/normocephalic, EOMI, DEBI, pharynx clear, moist mucosa, redness of the uvula appreciated,   Neck: Supple, no jugular venous distension, lymphadenopathy, thyromegaly or carotid bruits  Chest: Mild rhonchi  Cardiovascular: Normal S1, S2, regular rate and rhythm, no murmur, rub or gallop  Abdomen: Normal sounds present, soft, lax with no tenderness, no hepatosplenomegaly, and no masses  Extremities: No edema. Pulses are equally present.   Skin: intact, no rashes   Neurologic: Alert and oriented x 3, No focal deficit         Current Medications:   Scheduled medications  azithromycin, 500 mg, intravenous, q24h  DULoxetine, 30 mg, oral, Nightly  tiotropium, 2 puff, inhalation, Daily   And  fluticasone  furoate-vilanteroL, 1 puff, inhalation, Daily  guaiFENesin, 600 mg, oral, BID  heparin (porcine), 7,500 Units, subcutaneous, q8h MALIHA  insulin lispro, 0-10 Units, subcutaneous, TID AC  ipratropium-albuteroL, 3 mL, nebulization, TID  lactobacillus acidophilus, 1 tablet, oral, Daily  levothyroxine, 150 mcg, oral, Daily  lisinopril, 5 mg, oral, Daily  methylPREDNISolone sodium succinate (PF), 40 mg, intravenous, q24h  oxybutynin, 5 mg, oral, BID  oxygen, , inhalation, Continuous - Inhalation  pantoprazole, 40 mg, oral, Daily  QUEtiapine, 50 mg, oral, Nightly  rosuvastatin, 40 mg, oral, Daily      Continuous medications     PRN medications  PRN medications: ammonium lactate, benzonatate, cyclobenzaprine, dextrose, dextrose, furosemide, glucagon, glucagon, ipratropium-albuteroL, loperamide, valACYclovir      Investigations:  Labs, radiological imaging and cardiac work up were personally reviewed    Results from last 7 days   Lab Units 01/02/25  0554 01/01/25  0602 12/30/24  2338   SODIUM mmol/L 137 137 139   POTASSIUM mmol/L 4.4 4.0 4.0   CHLORIDE mmol/L 98 102 99   CO2 mmol/L 35* 30 33*   BUN mg/dL 21 19 10   CREATININE mg/dL 0.72 0.79 0.71   GLUCOSE mg/dL 288* 277* 103*   CALCIUM mg/dL 10.2 9.9 10.3     Results from last 7 days   Lab Units 01/02/25  0554   WBC AUTO x10*3/uL 13.7*   HEMOGLOBIN g/dL 13.8   HEMATOCRIT % 46.5*   PLATELETS AUTO x10*3/uL 298         ECG 12 lead    Result Date: 12/31/2024  Normal sinus rhythm Rightward axis Nonspecific ST abnormality Abnormal ECG No previous ECGs available    CT angio chest for pulmonary embolism    Result Date: 12/31/2024  STUDY: CT Angiogram of the Chest; 12/31/2024 2:29 AM INDICATION: Hypoxia.  Tachycardia. COMPARISON: CXR 12/30/2024. ACCESSION NUMBER(S): [id number] ORDERING CLINICIAN: JO VAZQUEZ TECHNIQUE:  CTA of the chest was performed with intravenous contrast. Images are reviewed and processed at a workstation according to the CT angiogram protocol with 3-D  and/or MIP post processing imaging generated.  Omnipaque 350 80 mL was administered intravenously. Automated mA/kV exposure control was utilized and patient examination was performed in strict accordance with principles of ALARA. FINDINGS: Image quality is limited due to the patient's body habitus as well as mild respiratory motion limiting assessment of the smaller pulmonary arteries.  Pulmonary arteries are adequately opacified without acute or chronic filling defects.  The thoracic aorta is normal in course and caliber without dissection or aneurysm. The heart is markedly enlarged in size without pericardial effusion. Slightly prominent but nonpathologically enlarged mediastinal and bilateral hilar lymph nodes are identified. Trachea and mainstem bronchi are patent and clear of debris.  Pleural parenchymal scarring is noted along the right upper lobe.  There is a subpleural soft tissue density in the lateral basilar segment of the right lower lobe measuring 2.1 x 1.1 cm in diameter on image 172 of series 406 which may represent rounded atelectasis however neoplasm is not entirely excluded.  Atelectasis is seen at the left lung base. There is mild bilateral lower lobe bronchial wall thickening along with mild bronchial wall thickening in the right upper lobe.  Minimal centrilobular emphysema is seen in the bilateral upper lobes. Upper abdomen demonstrates no acute pathology. There are no acute fractures.  No suspicious bony lesions. Generalized osseous demineralization is noted with a slightly pronounced thoracic kyphosis.    Chronic changes suggesting COPD and mild emphysema with bronchial wall thickening in the bilateral lower lobes and right upper lobe which may indicate acute or chronic bronchitis. Subpleural area of nodularity in the lateral aspect of the right lower lobe measuring up to 2.1 cm which may represent rounded atelectasis however neoplasm is not entirely excluded.  Further evaluation is  recommended with nonemergent PET/CT. Right upper lobe pleural parenchymal scarring. Prominent but nonpathologically enlarged mediastinal and bilateral hilar lymph nodes, most likely reactive however these can also be better assessed on PET/CT. Pronounced cardiomegaly. Signed by Luis Alberto Rosario    XR chest 2 views    Result Date: 12/30/2024  STUDY: Chest Radiographs;  12/20/2024, 4:02PM INDICATION: Shortness of breath and hypoxia. COMPARISON: None Available ACCESSION NUMBER(S): [id number] ORDERING CLINICIAN: JO VAZQUEZ TECHNIQUE:  Frontal and lateral chest. FINDINGS: Heart is top normal size. There is central vascular prominence without vascular congestion. No infiltrate is seen. There is minimal right costophrenic angle blunting, without significant pleural effusion on lateral view. There is no pneumothorax. The bony thorax reveals minimal thoracic spine arthritis and moderate kyphosis.    No detectable active cardiopulmonary disease. Remainder as above. Signed by Elton Montero MD      Assessment  Danelle Jacobs IS 64 y.o. female  on day  3 of admission presenting with Acute hypoxic respiratory failure (Multi). Actively being treated for the  following medical Problems:    Suspected obesity hypoventilation syndrome  Acute hypoxic respiratory failure  Opacity c/f atelectasis vs neoplasm  COPD    Recommendation:  Continue to wean oxygen as tolerated.   Patient is recommended to follow up with CT chest and sleep study outpatient.      Mark Barnes MD  PGY-1  01/03/25  1:22 PM    This is a preliminary note written by the resident. Please wait for attending addendum for finalization of note and recommendations.

## 2025-01-03 NOTE — CARE PLAN
The patient's goals for the shift include improved pulmonary hygiene    The clinical goals for the shift include Patient will be free from falls this shift.    Problem: Pain - Adult  Goal: Verbalizes/displays adequate comfort level or baseline comfort level  Outcome: Progressing     Problem: Safety - Adult  Goal: Free from fall injury  Outcome: Progressing     Problem: Discharge Planning  Goal: Discharge to home or other facility with appropriate resources  Outcome: Progressing     Problem: Chronic Conditions and Co-morbidities  Goal: Patient's chronic conditions and co-morbidity symptoms are monitored and maintained or improved  Outcome: Progressing

## 2025-01-03 NOTE — PROGRESS NOTES
"Danelle Jacobs is a 64 y.o. female on day 3 of admission presenting with Acute hypoxic respiratory failure (Multi).    Subjective   Still wheezign on 5 liter, feeling better, breathign ismuch improved       Objective     Physical Exam  Constitutional:       Appearance: Normal appearance.   HENT:      Head: Normocephalic and atraumatic.      Right Ear: Tympanic membrane and ear canal normal.      Left Ear: Tympanic membrane and ear canal normal.      Mouth/Throat:      Mouth: Mucous membranes are moist.      Pharynx: Oropharynx is clear.   Eyes:      Extraocular Movements: Extraocular movements intact.      Conjunctiva/sclera: Conjunctivae normal.      Pupils: Pupils are equal, round, and reactive to light.   Cardiovascular:      Rate and Rhythm: Normal rate and regular rhythm.      Pulses: Normal pulses.      Heart sounds: Normal heart sounds.   Pulmonary:      Effort: Pulmonary effort is normal.      Breath sounds: Normal breath sounds.   Abdominal:      General: Abdomen is flat. Bowel sounds are normal.      Palpations: Abdomen is soft.   Musculoskeletal:         General: Normal range of motion.      Cervical back: Normal range of motion and neck supple.   Skin:     General: Skin is warm and dry.      Capillary Refill: Capillary refill takes 2 to 3 seconds.   Neurological:      General: No focal deficit present.      Mental Status: She is alert and oriented to person, place, and time. Mental status is at baseline.   Psychiatric:         Mood and Affect: Mood normal.         Behavior: Behavior normal.         Thought Content: Thought content normal.         Judgment: Judgment normal.         Last Recorded Vitals  Blood pressure 123/60, pulse 65, temperature 36.1 °C (97 °F), temperature source Temporal, resp. rate 16, height 1.702 m (5' 7\"), weight 120 kg (265 lb), SpO2 93%.  Intake/Output last 3 Shifts:  I/O last 3 completed shifts:  In: 250 (2.1 mL/kg) [IV Piggyback:250]  Out: - (0 mL/kg)   Dosing Weight: 120 kg "     Relevant Results            This patient currently has cardiac telemetry ordered; if you would like to modify or discontinue the telemetry order, click here to go to the orders activity to modify/discontinue the order.                 Assessment/Plan   Assessment & Plan  Acute hypoxic respiratory failure (Multi)    Danelle is a 64-year-old female patient presenting to emergency department for evaluation of cough and congestion.  Patient states that she has a history of COPD and is not on home oxygen, usually uses her nebulizer however her power has been out at home since Thursday.  Patient went to urgent care for evaluation and was found to be hypoxic with an SpO2 of 79%.  Patient was sent to ED for further evaluation.  Patient was placed on high flow oxygen in ED, imaging completed showing chronic changes, negative acute however pulmonary and hilar lymph nodes were found and a PET scan, nonemergent is recommended.  Patient was treated with IV Solu-Medrol and DuoNeb treatments, remains on high flow oxygen, admitted for further medical management.     COPD exacerbation/acute respiratory failure  Inpatient telemetry SDU admission to Dr. Abreu  See imaging results above   Continue high flow oxygen  Nebulizer treatments every 2 hours per RT  Continue Solu-Medrol 40 mg IV push every 8 hours  Mucinex/Tessalon  Telemetry monitoring  COPD navigator     HTN/diabetes/COPD/hypothyroidism/insomnia/HLD/recurrent cold sores/chronic low back pain/right lower extremity insufficiency  #Chronic conditions  Continue home medications as ordered  Diabetic/cardiac diet  Hold home oral antidiabetic medication  Insulin sliding scale  Hypoglycemia protocol     DVT Ppx  SCDs  Heparin subcutaneous  Out of bed with assistance     Patient fully evaluated and on Airvo 60 L 60% FiO2.  Pulmonary consultation is pending.  Continue aggressive pulmonary hygiene  Patient fully evaluated and plan as above. .  Continue aggressive pulmonary  hygiene.    1/2: doing well , feeling better, still wheezing, on 5 liters still, contineu steroids    1/3: doing well, breathign sounds a lot better, wean oxygen, hopefully dc in next 24-48 hrs       I spent 35 minutes in the professional and overall care of this patient.      Junior Abreu, DO

## 2025-01-04 LAB
GLUCOSE BLD MANUAL STRIP-MCNC: 158 MG/DL (ref 74–99)
GLUCOSE BLD MANUAL STRIP-MCNC: 165 MG/DL (ref 74–99)
GLUCOSE BLD MANUAL STRIP-MCNC: 217 MG/DL (ref 74–99)
GLUCOSE BLD MANUAL STRIP-MCNC: 308 MG/DL (ref 74–99)

## 2025-01-04 PROCEDURE — 2500000004 HC RX 250 GENERAL PHARMACY W/ HCPCS (ALT 636 FOR OP/ED): Performed by: INTERNAL MEDICINE

## 2025-01-04 PROCEDURE — 2500000001 HC RX 250 WO HCPCS SELF ADMINISTERED DRUGS (ALT 637 FOR MEDICARE OP): Performed by: NURSE PRACTITIONER

## 2025-01-04 PROCEDURE — 2500000002 HC RX 250 W HCPCS SELF ADMINISTERED DRUGS (ALT 637 FOR MEDICARE OP, ALT 636 FOR OP/ED): Performed by: NURSE PRACTITIONER

## 2025-01-04 PROCEDURE — 94640 AIRWAY INHALATION TREATMENT: CPT

## 2025-01-04 PROCEDURE — 94669 MECHANICAL CHEST WALL OSCILL: CPT

## 2025-01-04 PROCEDURE — 82947 ASSAY GLUCOSE BLOOD QUANT: CPT

## 2025-01-04 PROCEDURE — 2500000004 HC RX 250 GENERAL PHARMACY W/ HCPCS (ALT 636 FOR OP/ED): Mod: JZ

## 2025-01-04 PROCEDURE — 2500000001 HC RX 250 WO HCPCS SELF ADMINISTERED DRUGS (ALT 637 FOR MEDICARE OP): Performed by: STUDENT IN AN ORGANIZED HEALTH CARE EDUCATION/TRAINING PROGRAM

## 2025-01-04 PROCEDURE — 2060000001 HC INTERMEDIATE ICU ROOM DAILY

## 2025-01-04 PROCEDURE — 2500000004 HC RX 250 GENERAL PHARMACY W/ HCPCS (ALT 636 FOR OP/ED): Performed by: NURSE PRACTITIONER

## 2025-01-04 PROCEDURE — 2500000002 HC RX 250 W HCPCS SELF ADMINISTERED DRUGS (ALT 637 FOR MEDICARE OP, ALT 636 FOR OP/ED): Performed by: STUDENT IN AN ORGANIZED HEALTH CARE EDUCATION/TRAINING PROGRAM

## 2025-01-04 PROCEDURE — 2500000005 HC RX 250 GENERAL PHARMACY W/O HCPCS: Performed by: STUDENT IN AN ORGANIZED HEALTH CARE EDUCATION/TRAINING PROGRAM

## 2025-01-04 RX ORDER — GUAIFENESIN 600 MG/1
1200 TABLET, EXTENDED RELEASE ORAL 2 TIMES DAILY
Status: DISCONTINUED | OUTPATIENT
Start: 2025-01-04 | End: 2025-01-06 | Stop reason: HOSPADM

## 2025-01-04 RX ORDER — FUROSEMIDE 10 MG/ML
20 INJECTION INTRAMUSCULAR; INTRAVENOUS ONCE
Status: COMPLETED | OUTPATIENT
Start: 2025-01-04 | End: 2025-01-04

## 2025-01-04 RX ADMIN — IPRATROPIUM BROMIDE AND ALBUTEROL SULFATE 3 ML: .5; 3 SOLUTION RESPIRATORY (INHALATION) at 12:51

## 2025-01-04 RX ADMIN — OXYBUTYNIN CHLORIDE 5 MG: 5 TABLET ORAL at 20:58

## 2025-01-04 RX ADMIN — IPRATROPIUM BROMIDE AND ALBUTEROL SULFATE 3 ML: .5; 3 SOLUTION RESPIRATORY (INHALATION) at 07:13

## 2025-01-04 RX ADMIN — TIOTROPIUM BROMIDE INHALATION SPRAY 2 PUFF: 3.12 SPRAY, METERED RESPIRATORY (INHALATION) at 07:17

## 2025-01-04 RX ADMIN — HEPARIN SODIUM 7500 UNITS: 5000 INJECTION INTRAVENOUS; SUBCUTANEOUS at 05:20

## 2025-01-04 RX ADMIN — FLUTICASONE FUROATE AND VILANTEROL TRIFENATATE 1 PUFF: 100; 25 POWDER RESPIRATORY (INHALATION) at 07:19

## 2025-01-04 RX ADMIN — HEPARIN SODIUM 7500 UNITS: 5000 INJECTION INTRAVENOUS; SUBCUTANEOUS at 14:44

## 2025-01-04 RX ADMIN — Medication 5 L/MIN: at 07:16

## 2025-01-04 RX ADMIN — PANTOPRAZOLE SODIUM 40 MG: 40 TABLET, DELAYED RELEASE ORAL at 05:21

## 2025-01-04 RX ADMIN — DULOXETINE HYDROCHLORIDE 30 MG: 30 CAPSULE, DELAYED RELEASE ORAL at 20:58

## 2025-01-04 RX ADMIN — GUAIFENESIN 600 MG: 600 TABLET, EXTENDED RELEASE ORAL at 08:58

## 2025-01-04 RX ADMIN — ROSUVASTATIN CALCIUM 40 MG: 10 TABLET, FILM COATED ORAL at 08:58

## 2025-01-04 RX ADMIN — IPRATROPIUM BROMIDE AND ALBUTEROL SULFATE 3 ML: .5; 3 SOLUTION RESPIRATORY (INHALATION) at 18:33

## 2025-01-04 RX ADMIN — INSULIN LISPRO 2 UNITS: 100 INJECTION, SOLUTION INTRAVENOUS; SUBCUTANEOUS at 08:57

## 2025-01-04 RX ADMIN — LISINOPRIL 5 MG: 5 TABLET ORAL at 08:58

## 2025-01-04 RX ADMIN — LOPERAMIDE HYDROCHLORIDE 2 MG: 2 CAPSULE ORAL at 12:12

## 2025-01-04 RX ADMIN — FUROSEMIDE 20 MG: 10 INJECTION, SOLUTION INTRAMUSCULAR; INTRAVENOUS at 14:44

## 2025-01-04 RX ADMIN — AZITHROMYCIN MONOHYDRATE 500 MG: 500 INJECTION, POWDER, LYOPHILIZED, FOR SOLUTION INTRAVENOUS at 11:05

## 2025-01-04 RX ADMIN — HEPARIN SODIUM 7500 UNITS: 5000 INJECTION INTRAVENOUS; SUBCUTANEOUS at 20:57

## 2025-01-04 RX ADMIN — METHYLPREDNISOLONE SODIUM SUCCINATE 40 MG: 40 INJECTION, POWDER, FOR SOLUTION INTRAMUSCULAR; INTRAVENOUS at 11:36

## 2025-01-04 RX ADMIN — GUAIFENESIN 1200 MG: 600 TABLET, EXTENDED RELEASE ORAL at 20:58

## 2025-01-04 RX ADMIN — LEVOTHYROXINE SODIUM 150 MCG: 0.15 TABLET ORAL at 05:21

## 2025-01-04 RX ADMIN — INSULIN LISPRO 2 UNITS: 100 INJECTION, SOLUTION INTRAVENOUS; SUBCUTANEOUS at 11:52

## 2025-01-04 RX ADMIN — Medication 1 TABLET: at 08:58

## 2025-01-04 RX ADMIN — QUETIAPINE FUMARATE 50 MG: 25 TABLET ORAL at 20:58

## 2025-01-04 RX ADMIN — INSULIN LISPRO 4 UNITS: 100 INJECTION, SOLUTION INTRAVENOUS; SUBCUTANEOUS at 16:45

## 2025-01-04 RX ADMIN — OXYBUTYNIN CHLORIDE 5 MG: 5 TABLET ORAL at 08:58

## 2025-01-04 RX ADMIN — Medication 4 L/MIN: at 20:58

## 2025-01-04 ASSESSMENT — COGNITIVE AND FUNCTIONAL STATUS - GENERAL
CLIMB 3 TO 5 STEPS WITH RAILING: A LITTLE
MOBILITY SCORE: 23
DRESSING REGULAR LOWER BODY CLOTHING: A LITTLE
DAILY ACTIVITIY SCORE: 23
CLIMB 3 TO 5 STEPS WITH RAILING: A LITTLE
DAILY ACTIVITIY SCORE: 23
MOBILITY SCORE: 23
DRESSING REGULAR LOWER BODY CLOTHING: A LITTLE

## 2025-01-04 ASSESSMENT — PAIN SCALES - GENERAL
PAINLEVEL_OUTOF10: 0 - NO PAIN
PAINLEVEL_OUTOF10: 0 - NO PAIN

## 2025-01-04 ASSESSMENT — PAIN - FUNCTIONAL ASSESSMENT: PAIN_FUNCTIONAL_ASSESSMENT: 0-10

## 2025-01-04 NOTE — CARE PLAN
The patient's goals for the shift include improved pulmonary hygiene    The clinical goals for the shift include pt will continue to remain free of injury through end of shift

## 2025-01-05 VITALS
DIASTOLIC BLOOD PRESSURE: 57 MMHG | SYSTOLIC BLOOD PRESSURE: 119 MMHG | HEIGHT: 67 IN | OXYGEN SATURATION: 91 % | BODY MASS INDEX: 41.59 KG/M2 | TEMPERATURE: 96.8 F | RESPIRATION RATE: 18 BRPM | HEART RATE: 65 BPM | WEIGHT: 265 LBS

## 2025-01-05 LAB
ANION GAP SERPL CALC-SCNC: 9 MMOL/L (ref 10–20)
BUN SERPL-MCNC: 19 MG/DL (ref 6–23)
CALCIUM SERPL-MCNC: 10.4 MG/DL (ref 8.6–10.3)
CHLORIDE SERPL-SCNC: 93 MMOL/L (ref 98–107)
CO2 SERPL-SCNC: 40 MMOL/L (ref 21–32)
CREAT SERPL-MCNC: 0.75 MG/DL (ref 0.5–1.05)
EGFRCR SERPLBLD CKD-EPI 2021: 88 ML/MIN/1.73M*2
ERYTHROCYTE [DISTWIDTH] IN BLOOD BY AUTOMATED COUNT: 15.7 % (ref 11.5–14.5)
GLUCOSE BLD MANUAL STRIP-MCNC: 162 MG/DL (ref 74–99)
GLUCOSE BLD MANUAL STRIP-MCNC: 198 MG/DL (ref 74–99)
GLUCOSE BLD MANUAL STRIP-MCNC: 234 MG/DL (ref 74–99)
GLUCOSE BLD MANUAL STRIP-MCNC: 333 MG/DL (ref 74–99)
GLUCOSE SERPL-MCNC: 127 MG/DL (ref 74–99)
HCT VFR BLD AUTO: 49.3 % (ref 36–46)
HGB BLD-MCNC: 14.5 G/DL (ref 12–16)
MAGNESIUM SERPL-MCNC: 2.72 MG/DL (ref 1.6–2.4)
MCH RBC QN AUTO: 26.8 PG (ref 26–34)
MCHC RBC AUTO-ENTMCNC: 29.4 G/DL (ref 32–36)
MCV RBC AUTO: 91 FL (ref 80–100)
NRBC BLD-RTO: 0 /100 WBCS (ref 0–0)
PLATELET # BLD AUTO: 265 X10*3/UL (ref 150–450)
POTASSIUM SERPL-SCNC: 4.7 MMOL/L (ref 3.5–5.3)
RBC # BLD AUTO: 5.42 X10*6/UL (ref 4–5.2)
SODIUM SERPL-SCNC: 137 MMOL/L (ref 136–145)
WBC # BLD AUTO: 11.3 X10*3/UL (ref 4.4–11.3)

## 2025-01-05 PROCEDURE — 2500000001 HC RX 250 WO HCPCS SELF ADMINISTERED DRUGS (ALT 637 FOR MEDICARE OP): Performed by: STUDENT IN AN ORGANIZED HEALTH CARE EDUCATION/TRAINING PROGRAM

## 2025-01-05 PROCEDURE — 85027 COMPLETE CBC AUTOMATED: CPT | Performed by: INTERNAL MEDICINE

## 2025-01-05 PROCEDURE — 2500000004 HC RX 250 GENERAL PHARMACY W/ HCPCS (ALT 636 FOR OP/ED): Mod: JZ

## 2025-01-05 PROCEDURE — 83735 ASSAY OF MAGNESIUM: CPT | Performed by: INTERNAL MEDICINE

## 2025-01-05 PROCEDURE — 36415 COLL VENOUS BLD VENIPUNCTURE: CPT | Performed by: INTERNAL MEDICINE

## 2025-01-05 PROCEDURE — 2500000004 HC RX 250 GENERAL PHARMACY W/ HCPCS (ALT 636 FOR OP/ED): Performed by: INTERNAL MEDICINE

## 2025-01-05 PROCEDURE — 82947 ASSAY GLUCOSE BLOOD QUANT: CPT

## 2025-01-05 PROCEDURE — 2500000005 HC RX 250 GENERAL PHARMACY W/O HCPCS: Performed by: STUDENT IN AN ORGANIZED HEALTH CARE EDUCATION/TRAINING PROGRAM

## 2025-01-05 PROCEDURE — 2500000002 HC RX 250 W HCPCS SELF ADMINISTERED DRUGS (ALT 637 FOR MEDICARE OP, ALT 636 FOR OP/ED): Performed by: NURSE PRACTITIONER

## 2025-01-05 PROCEDURE — 94640 AIRWAY INHALATION TREATMENT: CPT

## 2025-01-05 PROCEDURE — 2060000001 HC INTERMEDIATE ICU ROOM DAILY

## 2025-01-05 PROCEDURE — 80048 BASIC METABOLIC PNL TOTAL CA: CPT | Performed by: INTERNAL MEDICINE

## 2025-01-05 PROCEDURE — 2500000004 HC RX 250 GENERAL PHARMACY W/ HCPCS (ALT 636 FOR OP/ED): Performed by: NURSE PRACTITIONER

## 2025-01-05 PROCEDURE — 2500000002 HC RX 250 W HCPCS SELF ADMINISTERED DRUGS (ALT 637 FOR MEDICARE OP, ALT 636 FOR OP/ED): Performed by: STUDENT IN AN ORGANIZED HEALTH CARE EDUCATION/TRAINING PROGRAM

## 2025-01-05 PROCEDURE — 2500000001 HC RX 250 WO HCPCS SELF ADMINISTERED DRUGS (ALT 637 FOR MEDICARE OP): Performed by: NURSE PRACTITIONER

## 2025-01-05 RX ORDER — FUROSEMIDE 10 MG/ML
20 INJECTION INTRAMUSCULAR; INTRAVENOUS ONCE
Status: COMPLETED | OUTPATIENT
Start: 2025-01-05 | End: 2025-01-05

## 2025-01-05 RX ORDER — ACETAZOLAMIDE 500 MG/5ML
500 INJECTION, POWDER, LYOPHILIZED, FOR SOLUTION INTRAVENOUS ONCE
Status: COMPLETED | OUTPATIENT
Start: 2025-01-05 | End: 2025-01-05

## 2025-01-05 RX ADMIN — INSULIN LISPRO 2 UNITS: 100 INJECTION, SOLUTION INTRAVENOUS; SUBCUTANEOUS at 09:13

## 2025-01-05 RX ADMIN — OXYBUTYNIN CHLORIDE 5 MG: 5 TABLET ORAL at 20:44

## 2025-01-05 RX ADMIN — PANTOPRAZOLE SODIUM 40 MG: 40 TABLET, DELAYED RELEASE ORAL at 05:25

## 2025-01-05 RX ADMIN — HEPARIN SODIUM 7500 UNITS: 5000 INJECTION INTRAVENOUS; SUBCUTANEOUS at 15:00

## 2025-01-05 RX ADMIN — IPRATROPIUM BROMIDE AND ALBUTEROL SULFATE 3 ML: .5; 3 SOLUTION RESPIRATORY (INHALATION) at 11:38

## 2025-01-05 RX ADMIN — Medication 5 L/MIN: at 07:05

## 2025-01-05 RX ADMIN — ROSUVASTATIN CALCIUM 40 MG: 10 TABLET, FILM COATED ORAL at 09:16

## 2025-01-05 RX ADMIN — IPRATROPIUM BROMIDE AND ALBUTEROL SULFATE 3 ML: .5; 3 SOLUTION RESPIRATORY (INHALATION) at 18:36

## 2025-01-05 RX ADMIN — DULOXETINE HYDROCHLORIDE 30 MG: 30 CAPSULE, DELAYED RELEASE ORAL at 20:44

## 2025-01-05 RX ADMIN — LEVOTHYROXINE SODIUM 150 MCG: 0.15 TABLET ORAL at 05:21

## 2025-01-05 RX ADMIN — LISINOPRIL 5 MG: 5 TABLET ORAL at 09:16

## 2025-01-05 RX ADMIN — HEPARIN SODIUM 7500 UNITS: 5000 INJECTION INTRAVENOUS; SUBCUTANEOUS at 05:21

## 2025-01-05 RX ADMIN — METHYLPREDNISOLONE SODIUM SUCCINATE 40 MG: 40 INJECTION, POWDER, FOR SOLUTION INTRAMUSCULAR; INTRAVENOUS at 12:34

## 2025-01-05 RX ADMIN — QUETIAPINE FUMARATE 50 MG: 25 TABLET ORAL at 20:44

## 2025-01-05 RX ADMIN — FLUTICASONE FUROATE AND VILANTEROL TRIFENATATE 1 PUFF: 100; 25 POWDER RESPIRATORY (INHALATION) at 07:10

## 2025-01-05 RX ADMIN — AZITHROMYCIN MONOHYDRATE 500 MG: 500 INJECTION, POWDER, LYOPHILIZED, FOR SOLUTION INTRAVENOUS at 12:34

## 2025-01-05 RX ADMIN — LOPERAMIDE HYDROCHLORIDE 2 MG: 2 CAPSULE ORAL at 13:07

## 2025-01-05 RX ADMIN — Medication 3 L/MIN: at 18:38

## 2025-01-05 RX ADMIN — Medication 3 L/MIN: at 11:39

## 2025-01-05 RX ADMIN — IPRATROPIUM BROMIDE AND ALBUTEROL SULFATE 3 ML: .5; 3 SOLUTION RESPIRATORY (INHALATION) at 07:05

## 2025-01-05 RX ADMIN — OXYBUTYNIN CHLORIDE 5 MG: 5 TABLET ORAL at 09:16

## 2025-01-05 RX ADMIN — INSULIN LISPRO 2 UNITS: 100 INJECTION, SOLUTION INTRAVENOUS; SUBCUTANEOUS at 16:37

## 2025-01-05 RX ADMIN — GUAIFENESIN 1200 MG: 600 TABLET, EXTENDED RELEASE ORAL at 09:16

## 2025-01-05 RX ADMIN — INSULIN LISPRO 4 UNITS: 100 INJECTION, SOLUTION INTRAVENOUS; SUBCUTANEOUS at 12:48

## 2025-01-05 RX ADMIN — LOPERAMIDE HYDROCHLORIDE 2 MG: 2 CAPSULE ORAL at 09:22

## 2025-01-05 RX ADMIN — ACETAZOLAMIDE 500 MG: 500 INJECTION, POWDER, LYOPHILIZED, FOR SOLUTION INTRAVENOUS at 13:07

## 2025-01-05 RX ADMIN — FUROSEMIDE 20 MG: 10 INJECTION, SOLUTION INTRAMUSCULAR; INTRAVENOUS at 12:34

## 2025-01-05 RX ADMIN — Medication 1 TABLET: at 09:16

## 2025-01-05 RX ADMIN — TIOTROPIUM BROMIDE INHALATION SPRAY 2 PUFF: 3.12 SPRAY, METERED RESPIRATORY (INHALATION) at 07:12

## 2025-01-05 RX ADMIN — HEPARIN SODIUM 7500 UNITS: 5000 INJECTION INTRAVENOUS; SUBCUTANEOUS at 20:44

## 2025-01-05 RX ADMIN — GUAIFENESIN 1200 MG: 600 TABLET, EXTENDED RELEASE ORAL at 20:44

## 2025-01-05 ASSESSMENT — COGNITIVE AND FUNCTIONAL STATUS - GENERAL
MOBILITY SCORE: 24
DAILY ACTIVITIY SCORE: 24

## 2025-01-05 ASSESSMENT — PAIN - FUNCTIONAL ASSESSMENT: PAIN_FUNCTIONAL_ASSESSMENT: 0-10

## 2025-01-05 ASSESSMENT — PAIN SCALES - GENERAL
PAINLEVEL_OUTOF10: 0 - NO PAIN
PAINLEVEL_OUTOF10: 0 - NO PAIN

## 2025-01-05 NOTE — PROGRESS NOTES
"Danelle Jacobs is a 64 y.o. female on day 5 of admission presenting with Acute hypoxic respiratory failure (Multi).    Subjective   No events overnight.  Patient stable on nasal cannula.  Patient seen and examined at bedside.  She has no new complaints.       Objective     Physical Exam  Constitutional:       Appearance: Normal appearance.   HENT:      Head: Normocephalic and atraumatic.      Right Ear: Tympanic membrane and ear canal normal.      Left Ear: Tympanic membrane and ear canal normal.      Mouth/Throat:      Mouth: Mucous membranes are moist.      Pharynx: Oropharynx is clear.   Eyes:      Extraocular Movements: Extraocular movements intact.      Conjunctiva/sclera: Conjunctivae normal.      Pupils: Pupils are equal, round, and reactive to light.   Cardiovascular:      Rate and Rhythm: Normal rate and regular rhythm.      Pulses: Normal pulses.      Heart sounds: Normal heart sounds.   Pulmonary:      Effort: Pulmonary effort is normal.      Breath sounds: Normal breath sounds.   Abdominal:      General: Abdomen is flat. Bowel sounds are normal.      Palpations: Abdomen is soft.   Musculoskeletal:         General: Normal range of motion.      Cervical back: Normal range of motion and neck supple.   Skin:     General: Skin is warm and dry.      Capillary Refill: Capillary refill takes 2 to 3 seconds.   Neurological:      General: No focal deficit present.      Mental Status: She is alert and oriented to person, place, and time. Mental status is at baseline.   Psychiatric:         Mood and Affect: Mood normal.         Behavior: Behavior normal.         Thought Content: Thought content normal.         Judgment: Judgment normal.         Last Recorded Vitals  Blood pressure 119/57, pulse 60, temperature 36 °C (96.8 °F), temperature source Temporal, resp. rate 19, height 1.702 m (5' 7\"), weight 120 kg (265 lb), SpO2 95%.  Intake/Output last 3 Shifts:  No intake/output data recorded.    Relevant Results       "      This patient currently has cardiac telemetry ordered; if you would like to modify or discontinue the telemetry order, click here to go to the orders activity to modify/discontinue the order.                 Assessment/Plan   Assessment & Plan  Acute hypoxic respiratory failure (Multi)    Danelle is a 64-year-old female patient presenting to emergency department for evaluation of cough and congestion.  Patient states that she has a history of COPD and is not on home oxygen, usually uses her nebulizer however her power has been out at home since Thursday.  Patient went to urgent care for evaluation and was found to be hypoxic with an SpO2 of 79%.  Patient was sent to ED for further evaluation.  Patient was placed on high flow oxygen in ED, imaging completed showing chronic changes, negative acute however pulmonary and hilar lymph nodes were found and a PET scan, nonemergent is recommended.  Patient was treated with IV Solu-Medrol and DuoNeb treatments, remains on high flow oxygen, admitted for further medical management.     COPD exacerbation/acute respiratory failure  Inpatient telemetry SDU admission to Dr. Abreu  See imaging results above   Continue high flow oxygen  Nebulizer treatments every 2 hours per RT  Continue Solu-Medrol 40 mg IV push every 8 hours  Mucinex/Tessalon  Telemetry monitoring  COPD navigator     HTN/diabetes/COPD/hypothyroidism/insomnia/HLD/recurrent cold sores/chronic low back pain/right lower extremity insufficiency  #Chronic conditions  Continue home medications as ordered  Diabetic/cardiac diet  Hold home oral antidiabetic medication  Insulin sliding scale  Hypoglycemia protocol     DVT Ppx  SCDs  Heparin subcutaneous  Out of bed with assistance     Patient fully evaluated and on Airvo 60 L 60% FiO2.  Pulmonary consultation is pending.  Continue aggressive pulmonary hygiene  Patient fully evaluated and plan as above. .  Continue aggressive pulmonary hygiene.    1/2: doing well , feeling  better, still wheezing, on 5 liters still, contineu steroids    1/3: doing well, breathign sounds a lot better, wean oxygen, hopefully dc in next 24-48 hrs    1/4: Will give a dose of IV Lasix today as patient appears to have some component of fluid overload.  Continue nebulizers and mucolytic's.  Patient will need outpatient sleep study and CT chest per pulmonology.  Reevaluate tomorrow.    1/5: Leukocytosis improved from 13 down to 11.  Supplemental O2 decreased from 4 L to 3 L.  Patient with a component of contraction alkalosis, will give Diamox along with an additional dose of Lasix today as her creatinine is stable.  Continue to wean O2.       I spent 35 minutes in the professional and overall care of this patient.      Andrea Triana, DO

## 2025-01-05 NOTE — CARE PLAN
The patient's goals for the shift include breathe easier.    The clinical goals for the shift include pt will tolerate lasix and diamox.        Problem: Pain - Adult  Goal: Verbalizes/displays adequate comfort level or baseline comfort level  Outcome: Progressing     Problem: Safety - Adult  Goal: Free from fall injury  Outcome: Progressing     Problem: Discharge Planning  Goal: Discharge to home or other facility with appropriate resources  Outcome: Progressing     Problem: Chronic Conditions and Co-morbidities  Goal: Patient's chronic conditions and co-morbidity symptoms are monitored and maintained or improved  Outcome: Progressing     Problem: Respiratory  Goal: Clear secretions with interventions this shift  Outcome: Progressing     Problem: Respiratory  Goal: No signs of respiratory distress (eg. Use of accessory muscles. Peds grunting)  Outcome: Progressing     Problem: Respiratory  Goal: Minimal/no exertional discomfort or dyspnea this shift  Outcome: Progressing     Problem: Diabetes  Goal: Maintain electrolyte levels within acceptable range throughout shift  Outcome: Progressing     Problem: Diabetes  Goal: Decrease in ketones present in urine by end of shift  Outcome: Progressing

## 2025-01-05 NOTE — PROGRESS NOTES
"Danelle Jacobs is a 64 y.o. female on day 5 of admission presenting with Acute hypoxic respiratory failure (Multi).    Subjective   No events overnight.  Patient stable on nasal cannula.  Patient seen and examined at bedside.  She has no new complaints.       Objective     Physical Exam  Constitutional:       Appearance: Normal appearance.   HENT:      Head: Normocephalic and atraumatic.      Right Ear: Tympanic membrane and ear canal normal.      Left Ear: Tympanic membrane and ear canal normal.      Mouth/Throat:      Mouth: Mucous membranes are moist.      Pharynx: Oropharynx is clear.   Eyes:      Extraocular Movements: Extraocular movements intact.      Conjunctiva/sclera: Conjunctivae normal.      Pupils: Pupils are equal, round, and reactive to light.   Cardiovascular:      Rate and Rhythm: Normal rate and regular rhythm.      Pulses: Normal pulses.      Heart sounds: Normal heart sounds.   Pulmonary:      Effort: Pulmonary effort is normal.      Breath sounds: Normal breath sounds.   Abdominal:      General: Abdomen is flat. Bowel sounds are normal.      Palpations: Abdomen is soft.   Musculoskeletal:         General: Normal range of motion.      Cervical back: Normal range of motion and neck supple.   Skin:     General: Skin is warm and dry.      Capillary Refill: Capillary refill takes 2 to 3 seconds.   Neurological:      General: No focal deficit present.      Mental Status: She is alert and oriented to person, place, and time. Mental status is at baseline.   Psychiatric:         Mood and Affect: Mood normal.         Behavior: Behavior normal.         Thought Content: Thought content normal.         Judgment: Judgment normal.         Last Recorded Vitals  Blood pressure 127/63, pulse 64, temperature 35.9 °C (96.6 °F), temperature source Temporal, resp. rate 18, height 1.702 m (5' 7\"), weight 120 kg (265 lb), SpO2 90%.  Intake/Output last 3 Shifts:  No intake/output data recorded.    Relevant " Results            This patient currently has cardiac telemetry ordered; if you would like to modify or discontinue the telemetry order, click here to go to the orders activity to modify/discontinue the order.                 Assessment/Plan   Assessment & Plan  Acute hypoxic respiratory failure (Multi)    Danelle is a 64-year-old female patient presenting to emergency department for evaluation of cough and congestion.  Patient states that she has a history of COPD and is not on home oxygen, usually uses her nebulizer however her power has been out at home since Thursday.  Patient went to urgent care for evaluation and was found to be hypoxic with an SpO2 of 79%.  Patient was sent to ED for further evaluation.  Patient was placed on high flow oxygen in ED, imaging completed showing chronic changes, negative acute however pulmonary and hilar lymph nodes were found and a PET scan, nonemergent is recommended.  Patient was treated with IV Solu-Medrol and DuoNeb treatments, remains on high flow oxygen, admitted for further medical management.     COPD exacerbation/acute respiratory failure  Inpatient telemetry SDU admission to Dr. Abreu  See imaging results above   Continue high flow oxygen  Nebulizer treatments every 2 hours per RT  Continue Solu-Medrol 40 mg IV push every 8 hours  Mucinex/Tessalon  Telemetry monitoring  COPD navigator     HTN/diabetes/COPD/hypothyroidism/insomnia/HLD/recurrent cold sores/chronic low back pain/right lower extremity insufficiency  #Chronic conditions  Continue home medications as ordered  Diabetic/cardiac diet  Hold home oral antidiabetic medication  Insulin sliding scale  Hypoglycemia protocol     DVT Ppx  SCDs  Heparin subcutaneous  Out of bed with assistance     Patient fully evaluated and on Airvo 60 L 60% FiO2.  Pulmonary consultation is pending.  Continue aggressive pulmonary hygiene  Patient fully evaluated and plan as above. .  Continue aggressive pulmonary hygiene.    1/2: doing  well , feeling better, still wheezing, on 5 liters still, contineu steroids    1/3: doing well, breathign sounds a lot better, wean oxygen, hopefully dc in next 24-48 hrs    1/4: Will give a dose of IV Lasix today as patient appears to have some component of fluid overload.  Continue nebulizers and mucolytic's.  Patient will need outpatient sleep study and CT chest per pulmonology.  Reevaluate tomorrow.         I spent 35 minutes in the professional and overall care of this patient.      Andrea Triana, DO

## 2025-01-06 VITALS
SYSTOLIC BLOOD PRESSURE: 126 MMHG | BODY MASS INDEX: 41.59 KG/M2 | RESPIRATION RATE: 18 BRPM | DIASTOLIC BLOOD PRESSURE: 57 MMHG | HEIGHT: 67 IN | WEIGHT: 265 LBS | TEMPERATURE: 97.3 F | HEART RATE: 81 BPM | OXYGEN SATURATION: 89 %

## 2025-01-06 LAB
GLUCOSE BLD MANUAL STRIP-MCNC: 159 MG/DL (ref 74–99)
GLUCOSE BLD MANUAL STRIP-MCNC: 240 MG/DL (ref 74–99)
GLUCOSE BLD MANUAL STRIP-MCNC: 246 MG/DL (ref 74–99)
GLUCOSE BLD MANUAL STRIP-MCNC: 298 MG/DL (ref 74–99)

## 2025-01-06 PROCEDURE — 2500000001 HC RX 250 WO HCPCS SELF ADMINISTERED DRUGS (ALT 637 FOR MEDICARE OP): Performed by: NURSE PRACTITIONER

## 2025-01-06 PROCEDURE — 94640 AIRWAY INHALATION TREATMENT: CPT

## 2025-01-06 PROCEDURE — 2500000005 HC RX 250 GENERAL PHARMACY W/O HCPCS: Performed by: STUDENT IN AN ORGANIZED HEALTH CARE EDUCATION/TRAINING PROGRAM

## 2025-01-06 PROCEDURE — 82947 ASSAY GLUCOSE BLOOD QUANT: CPT

## 2025-01-06 PROCEDURE — 94761 N-INVAS EAR/PLS OXIMETRY MLT: CPT

## 2025-01-06 PROCEDURE — 2500000002 HC RX 250 W HCPCS SELF ADMINISTERED DRUGS (ALT 637 FOR MEDICARE OP, ALT 636 FOR OP/ED): Performed by: STUDENT IN AN ORGANIZED HEALTH CARE EDUCATION/TRAINING PROGRAM

## 2025-01-06 PROCEDURE — 2500000004 HC RX 250 GENERAL PHARMACY W/ HCPCS (ALT 636 FOR OP/ED): Performed by: INTERNAL MEDICINE

## 2025-01-06 PROCEDURE — 2500000002 HC RX 250 W HCPCS SELF ADMINISTERED DRUGS (ALT 637 FOR MEDICARE OP, ALT 636 FOR OP/ED): Performed by: NURSE PRACTITIONER

## 2025-01-06 PROCEDURE — 99238 HOSP IP/OBS DSCHRG MGMT 30/<: CPT | Performed by: STUDENT IN AN ORGANIZED HEALTH CARE EDUCATION/TRAINING PROGRAM

## 2025-01-06 PROCEDURE — 2500000004 HC RX 250 GENERAL PHARMACY W/ HCPCS (ALT 636 FOR OP/ED): Performed by: NURSE PRACTITIONER

## 2025-01-06 PROCEDURE — 2500000001 HC RX 250 WO HCPCS SELF ADMINISTERED DRUGS (ALT 637 FOR MEDICARE OP): Performed by: STUDENT IN AN ORGANIZED HEALTH CARE EDUCATION/TRAINING PROGRAM

## 2025-01-06 PROCEDURE — 9420000001 HC RT PATIENT EDUCATION 5 MIN

## 2025-01-06 PROCEDURE — 2500000004 HC RX 250 GENERAL PHARMACY W/ HCPCS (ALT 636 FOR OP/ED): Mod: JZ

## 2025-01-06 RX ORDER — OMEPRAZOLE 40 MG/1
40 CAPSULE, DELAYED RELEASE ORAL DAILY PRN
Qty: 90 CAPSULE | Refills: 3 | Status: SHIPPED | OUTPATIENT
Start: 2025-01-06

## 2025-01-06 RX ORDER — PREDNISONE 10 MG/1
TABLET ORAL
Qty: 30 TABLET | Refills: 0 | Status: SHIPPED | OUTPATIENT
Start: 2025-01-06 | End: 2025-01-17 | Stop reason: WASHOUT

## 2025-01-06 RX ORDER — AZITHROMYCIN 250 MG/1
500 TABLET, FILM COATED ORAL
Status: DISCONTINUED | OUTPATIENT
Start: 2025-01-07 | End: 2025-01-06 | Stop reason: HOSPADM

## 2025-01-06 RX ORDER — AZITHROMYCIN 250 MG/1
TABLET, FILM COATED ORAL
Qty: 6 TABLET | Refills: 0 | Status: SHIPPED | OUTPATIENT
Start: 2025-01-06 | End: 2025-01-17 | Stop reason: WASHOUT

## 2025-01-06 RX ORDER — TIRZEPATIDE 7.5 MG/.5ML
7.5 INJECTION, SOLUTION SUBCUTANEOUS WEEKLY
Qty: 3 ML | Refills: 3 | Status: SHIPPED | OUTPATIENT
Start: 2025-01-06 | End: 2025-01-17

## 2025-01-06 RX ADMIN — LOPERAMIDE HYDROCHLORIDE 2 MG: 2 CAPSULE ORAL at 11:57

## 2025-01-06 RX ADMIN — IPRATROPIUM BROMIDE AND ALBUTEROL SULFATE 3 ML: .5; 3 SOLUTION RESPIRATORY (INHALATION) at 07:01

## 2025-01-06 RX ADMIN — ROSUVASTATIN CALCIUM 40 MG: 10 TABLET, FILM COATED ORAL at 08:47

## 2025-01-06 RX ADMIN — LISINOPRIL 5 MG: 5 TABLET ORAL at 08:47

## 2025-01-06 RX ADMIN — METHYLPREDNISOLONE SODIUM SUCCINATE 40 MG: 40 INJECTION, POWDER, FOR SOLUTION INTRAMUSCULAR; INTRAVENOUS at 11:55

## 2025-01-06 RX ADMIN — INSULIN LISPRO 2 UNITS: 100 INJECTION, SOLUTION INTRAVENOUS; SUBCUTANEOUS at 11:55

## 2025-01-06 RX ADMIN — HEPARIN SODIUM 7500 UNITS: 5000 INJECTION INTRAVENOUS; SUBCUTANEOUS at 05:05

## 2025-01-06 RX ADMIN — Medication 3 L/MIN: at 07:19

## 2025-01-06 RX ADMIN — AZITHROMYCIN MONOHYDRATE 500 MG: 500 INJECTION, POWDER, LYOPHILIZED, FOR SOLUTION INTRAVENOUS at 12:00

## 2025-01-06 RX ADMIN — GUAIFENESIN 1200 MG: 600 TABLET, EXTENDED RELEASE ORAL at 08:47

## 2025-01-06 RX ADMIN — INSULIN LISPRO 6 UNITS: 100 INJECTION, SOLUTION INTRAVENOUS; SUBCUTANEOUS at 16:31

## 2025-01-06 RX ADMIN — FLUTICASONE FUROATE AND VILANTEROL TRIFENATATE 1 PUFF: 100; 25 POWDER RESPIRATORY (INHALATION) at 07:18

## 2025-01-06 RX ADMIN — Medication 1 TABLET: at 08:47

## 2025-01-06 RX ADMIN — HEPARIN SODIUM 7500 UNITS: 5000 INJECTION INTRAVENOUS; SUBCUTANEOUS at 14:04

## 2025-01-06 RX ADMIN — OXYBUTYNIN CHLORIDE 5 MG: 5 TABLET ORAL at 08:47

## 2025-01-06 RX ADMIN — PANTOPRAZOLE SODIUM 40 MG: 40 TABLET, DELAYED RELEASE ORAL at 05:05

## 2025-01-06 RX ADMIN — LEVOTHYROXINE SODIUM 150 MCG: 0.15 TABLET ORAL at 05:05

## 2025-01-06 RX ADMIN — INSULIN LISPRO 4 UNITS: 100 INJECTION, SOLUTION INTRAVENOUS; SUBCUTANEOUS at 08:47

## 2025-01-06 RX ADMIN — IPRATROPIUM BROMIDE AND ALBUTEROL SULFATE 3 ML: .5; 3 SOLUTION RESPIRATORY (INHALATION) at 12:56

## 2025-01-06 RX ADMIN — TIOTROPIUM BROMIDE INHALATION SPRAY 2 PUFF: 3.12 SPRAY, METERED RESPIRATORY (INHALATION) at 07:17

## 2025-01-06 ASSESSMENT — PAIN SCALES - GENERAL: PAINLEVEL_OUTOF10: 0 - NO PAIN

## 2025-01-06 ASSESSMENT — PAIN - FUNCTIONAL ASSESSMENT: PAIN_FUNCTIONAL_ASSESSMENT: 0-10

## 2025-01-06 NOTE — CARE PLAN
The clinical goals for the shift include rest.    Problem: Safety - Adult  Goal: Free from fall injury  Outcome: Progressing     Problem: Respiratory  Goal: Minimal/no exertional discomfort or dyspnea this shift  Outcome: Progressing

## 2025-01-06 NOTE — PROGRESS NOTES
Home Oxygen Evaluation        Date/Time SpO2 Medical Gas Therapy Medical Gas Delivery Method Oxygen L/min Patient is on During the Study Patient Activity During Study             01/06/25 1130 91 %  None (Room air)  --  --  At rest     01/06/25 1135 87 %  None (Room air)  --  --  Ambulating     01/06/25 1136 92 %  Supplemental oxygen  Nasal cannula  2 L/min  Ambulating                     Was a Home Oxygen Evaluation Performed? Yes  Based on the Home Oxygen Evaluation, Does the Patient Qualify for Home Oxygen Therapy? Yes    "[employer] is the Patient's Preferred DME Company.  Was the DME Company Notified of Home Oxygen Therapy Needs? Yes    Recommendations:       Recommended Oxygen Dose with Activity is 2 L/min

## 2025-01-06 NOTE — NURSING NOTE
Walking pulse ox completed by Jesus Umanzor. Patient able to ambulate approximately 300ft. Patient started at  93% O2 on room air with HR in 80s- [id number] throughout ambulation. Towards mid of walking pulse ox dropped to 90% and by the end of walk and return to room, patient 87% on RA.

## 2025-01-06 NOTE — NURSING NOTE
Met with patient and son and family at the bedside. Discharge Planning discussed. AVS updated with follow-ups, education, and medication information. Verified/ entered a PCP and pharmacy. New medications and side effects discussed. Discussed PCP, and All questions answered.  Updated nurse on this info. AVS printed and hi-lighted. IV and tele removed.

## 2025-01-06 NOTE — PROGRESS NOTES
IV to PO Conversion    Danelle Jacobs is a 64 y.o. female who qualifies for IV to PO therapy conversion.    Inclusion and exclusion criteria have been evaluated. Will change existing order for azithromycin 500 mg IV every 24 hours  to azithromycin 500 mg PO every 24 hours  per protocol/policy.      Please call with any questions.    Francy Robertson, PharmD, BCPS   1/6/2025 2:55 PM

## 2025-01-06 NOTE — NURSING NOTE
Pulmonary Disease Navigator Documentation:    Comments:  Met with patient to re-enforce education, answer questions and discuss any concerns regarding discharge.  Patient evaluated for home oxygen and is requiring 2 lpm nc with activity (see separate testing).  Patient stated she plans on following with Dr. Enrique for her outpatient pulmonary needs, contact information provided at this time.  Patient denied need for respiratory medication refills or assistance making follow-up appointments at this time.  Patient has navigator contact information for any questions post discharge.

## 2025-01-06 NOTE — PROGRESS NOTES
Pulmonary Critical Care note    Patient Name :Danelle Jacobs  Date of service : 01/06/25  MRN: [id number]  YOB: 1960      Interval History:  1/2/2025: Patient is resting in bed, appears comfortable. She is on 5L O2 and reports poor compliance with her home breathing treatment.  1/3/2025: Patient resting in bed, she says that her breathing feels improved. She is on 4L O2 on exam.  1/6/2025: Patient sitting in bed, she says that she feels well. On exam in the morning, she is on 3L O2. She is not wearing oxygen during re-examination in the afternoon.    History of Present Illness: Danelle Jacobs is a 64 y.o. female presenting to the emergency department for evaluation of cough and congestion. Patient went to urgent care yesterday afternoon at approximately 3 PM and was found to have a low pulse ox of 79%. Patient was sent by urgent care to emergency department for further evaluation. Patient admits to history of COPD and states she is not normally on oxygen. Patient states that she typically uses her nebulizer at home but has been unable to since she has been without power since Thursday. Patient admits to a productive cough with yellow, greenish sputum and notes sinus drainage, runny nose, congestion, and intermittent shortness of breath that is worse with exertion. Patient denies chest pain or dizziness. Patient denies nausea, vomiting, diarrhea, fever, chills, abdominal, or urinary symptoms. Patient was placed on high flow oxygen after she was moved to a room in the emergency department.      64 y.o. female  on day  6 of admission presenting with Acute hypoxic respiratory failure (Multi).  Past Medical/Surgical History:    has a past medical history of Bronchopneumonia, unspecified organism, Cellulitis of right lower limb, COPD (chronic obstructive pulmonary disease) (Multi), Diabetes mellitus (Multi), Disease of thyroid gland, Fatty (change of) liver, not elsewhere classified, Hypertension,  Localized edema (03/04/2015), Other long term (current) drug therapy, Other malaise, Otitis media, unspecified, left ear, Personal history of diseases of the skin and subcutaneous tissue, Personal history of other diseases of the circulatory system, Personal history of other diseases of the female genital tract, Personal history of other diseases of the nervous system and sense organs, Personal history of other diseases of the nervous system and sense organs, Personal history of other diseases of the respiratory system, Personal history of other diseases of the respiratory system, Personal history of other diseases of the respiratory system, Personal history of other endocrine, nutritional and metabolic disease, Personal history of other endocrine, nutritional and metabolic disease, Personal history of other infectious and parasitic diseases, and Personal history of other mental and behavioral disorders.   has a past surgical history that includes Hernia repair (03/04/2015); Other surgical history (03/04/2015); Gallbladder surgery (03/04/2015); Other surgical history (03/04/2015); Cholecystectomy (12/31/2015); Foot surgery (12/31/2015); and Appendectomy (05/07/2015).   reports that she quit smoking about 7 years ago. Her smoking use included cigarettes. She has never used smokeless tobacco. She reports that she does not currently use alcohol. She reports that she does not use drugs.  Family History:   No relevant family history has been documented for this patient.    Allergies:     Patient has no known allergies.      Social history:   reports that she quit smoking about 7 years ago. Her smoking use included cigarettes. She has never used smokeless tobacco. She reports that she does not currently use alcohol. She reports that she does not use drugs.      Review of Systems:   General: denies weight gain, denies loss of appetite, fever, chills, night sweats.  HEENT: denies headaches, dizziness, head trauma, visual  changes, eye pain, tinnitus, nosebleeds, hoarseness or throat pain    Respiratory: denies chest pain, dyspnea, cough and hemoptysis  Cardiovascular: denies orthopnea, paroxysmal nocturnal dyspnea, leg swelling, and previous heart attack.    Gastrointestinal: denies pain, nausea vomiting, diarrhea, constipation, melena or bleeding.  Genitourinary: denies hematuria, frequency, urgency or dysuria  Neurology: denies syncope, seizures, paralysis, paraesthesia   Endocrine: denies polyuria, polydipsia, skin or hair changes, and heat or cold intolerance  Musculoskeletal: denies joint pain, swelling, arthritis or myalgia  Hematologic: denies bleeding, adenopathy and easy bruising  Skin: denies rashes and skin discoloration  Psychiatry: denies depression    Physical Exam:   Vital Signs:   Vitals:    01/06/25 1136   BP:    Pulse:    Resp:    Temp:    SpO2: 92%     Vitals:    12/30/24 1532   Weight: 120 kg (265 lb)         Input/Output:  No intake or output data in the 24 hours ending 01/06/25 1435    Oxygen requirements:    Ventilator Information:  FiO2 (%):  [32 %] 32 %  Oxygen Therapy/Pulse Ox  Medical Gas Therapy: Supplemental oxygen  Medical Gas Delivery Method: Nasal cannula  FiO2 (%): 32 %  SpO2: 92 %  Patient Activity During SpO2 Measurement: At rest  $ Pulse Oximetry Charge: Multiple  Temp: 36 °C (96.8 °F)        General appearance: Not in pain or distress, in no respiratory distress    HEENT: Atraumatic/normocephalic, EOMI, DEBI, pharynx clear, moist mucosa, redness of the uvula appreciated,   Neck: Supple, no jugular venous distension, lymphadenopathy, thyromegaly or carotid bruits  Chest: Clear to auscultation  Cardiovascular: Normal S1, S2, regular rate and rhythm, no murmur, rub or gallop  Abdomen: Normal sounds present, soft, lax with no tenderness, no hepatosplenomegaly, and no masses  Extremities: No edema. Pulses are equally present.   Skin: intact, no rashes   Neurologic: Alert and oriented x 3, No focal  deficit         Current Medications:   Scheduled medications  azithromycin, 500 mg, intravenous, q24h  DULoxetine, 30 mg, oral, Nightly  tiotropium, 2 puff, inhalation, Daily   And  fluticasone furoate-vilanteroL, 1 puff, inhalation, Daily  guaiFENesin, 1,200 mg, oral, BID  heparin (porcine), 7,500 Units, subcutaneous, q8h MALIHA  insulin lispro, 0-10 Units, subcutaneous, TID AC  ipratropium-albuteroL, 3 mL, nebulization, TID  lactobacillus acidophilus, 1 tablet, oral, Daily  levothyroxine, 150 mcg, oral, Daily  lisinopril, 5 mg, oral, Daily  methylPREDNISolone sodium succinate (PF), 40 mg, intravenous, q24h  oxybutynin, 5 mg, oral, BID  oxygen, , inhalation, Continuous - Inhalation  pantoprazole, 40 mg, oral, Daily  QUEtiapine, 50 mg, oral, Nightly  rosuvastatin, 40 mg, oral, Daily      Continuous medications     PRN medications  PRN medications: ammonium lactate, benzonatate, cyclobenzaprine, dextrose, dextrose, glucagon, glucagon, ipratropium-albuteroL, loperamide, valACYclovir      Investigations:  Labs, radiological imaging and cardiac work up were personally reviewed    Results from last 7 days   Lab Units 01/05/25  0646 01/02/25  0554 01/01/25  0602   SODIUM mmol/L 137 137 137   POTASSIUM mmol/L 4.7 4.4 4.0   CHLORIDE mmol/L 93* 98 102   CO2 mmol/L 40* 35* 30   BUN mg/dL 19 21 19   CREATININE mg/dL 0.75 0.72 0.79   GLUCOSE mg/dL 127* 288* 277*   CALCIUM mg/dL 10.4* 10.2 9.9     Results from last 7 days   Lab Units 01/05/25  0646   WBC AUTO x10*3/uL 11.3   HEMOGLOBIN g/dL 14.5   HEMATOCRIT % 49.3*   PLATELETS AUTO x10*3/uL 265         ECG 12 lead    Result Date: 12/31/2024  Normal sinus rhythm Rightward axis Nonspecific ST abnormality Abnormal ECG No previous ECGs available    CT angio chest for pulmonary embolism    Result Date: 12/31/2024  STUDY: CT Angiogram of the Chest; 12/31/2024 2:29 AM INDICATION: Hypoxia.  Tachycardia. COMPARISON: CXR 12/30/2024. ACCESSION NUMBER(S): [id number] ORDERING CLINICIAN:  JO VAZQUEZ TECHNIQUE:  CTA of the chest was performed with intravenous contrast. Images are reviewed and processed at a workstation according to the CT angiogram protocol with 3-D and/or MIP post processing imaging generated.  Omnipaque 350 80 mL was administered intravenously. Automated mA/kV exposure control was utilized and patient examination was performed in strict accordance with principles of ALARA. FINDINGS: Image quality is limited due to the patient's body habitus as well as mild respiratory motion limiting assessment of the smaller pulmonary arteries.  Pulmonary arteries are adequately opacified without acute or chronic filling defects.  The thoracic aorta is normal in course and caliber without dissection or aneurysm. The heart is markedly enlarged in size without pericardial effusion. Slightly prominent but nonpathologically enlarged mediastinal and bilateral hilar lymph nodes are identified. Trachea and mainstem bronchi are patent and clear of debris.  Pleural parenchymal scarring is noted along the right upper lobe.  There is a subpleural soft tissue density in the lateral basilar segment of the right lower lobe measuring 2.1 x 1.1 cm in diameter on image 172 of series 406 which may represent rounded atelectasis however neoplasm is not entirely excluded.  Atelectasis is seen at the left lung base. There is mild bilateral lower lobe bronchial wall thickening along with mild bronchial wall thickening in the right upper lobe.  Minimal centrilobular emphysema is seen in the bilateral upper lobes. Upper abdomen demonstrates no acute pathology. There are no acute fractures.  No suspicious bony lesions. Generalized osseous demineralization is noted with a slightly pronounced thoracic kyphosis.    Chronic changes suggesting COPD and mild emphysema with bronchial wall thickening in the bilateral lower lobes and right upper lobe which may indicate acute or chronic bronchitis. Subpleural area of  nodularity in the lateral aspect of the right lower lobe measuring up to 2.1 cm which may represent rounded atelectasis however neoplasm is not entirely excluded.  Further evaluation is recommended with nonemergent PET/CT. Right upper lobe pleural parenchymal scarring. Prominent but nonpathologically enlarged mediastinal and bilateral hilar lymph nodes, most likely reactive however these can also be better assessed on PET/CT. Pronounced cardiomegaly. Signed by Luis Alberto Rosario    XR chest 2 views    Result Date: 12/30/2024  STUDY: Chest Radiographs;  12/20/2024, 4:02PM INDICATION: Shortness of breath and hypoxia. COMPARISON: None Available ACCESSION NUMBER(S): [id number] ORDERING CLINICIAN: JO VAZQUEZ TECHNIQUE:  Frontal and lateral chest. FINDINGS: Heart is top normal size. There is central vascular prominence without vascular congestion. No infiltrate is seen. There is minimal right costophrenic angle blunting, without significant pleural effusion on lateral view. There is no pneumothorax. The bony thorax reveals minimal thoracic spine arthritis and moderate kyphosis.    No detectable active cardiopulmonary disease. Remainder as above. Signed by Elton Montero MD      Assessment  Danelle Jacobs IS 64 y.o. female  on day  6 of admission presenting with Acute hypoxic respiratory failure (Multi). Actively being treated for the  following medical Problems:    Suspected obesity hypoventilation syndrome  Acute hypoxic respiratory failure  Opacity c/f atelectasis vs neoplasm  COPD    Recommendation:  Follow up CT chest and sleep study outpatient.   Consider BiPAP in the setting of chronic CO2 retention due to hypoventilation.      Mark Barnes MD  PGY-1  01/06/25  2:35 PM    This is a preliminary note written by the resident. Please wait for attending addendum for finalization of note and recommendations.

## 2025-01-07 NOTE — DISCHARGE SUMMARY
Discharge Diagnosis  Acute hypoxic respiratory failure (Multi)    Issues Requiring Follow-Up  copd    Test Results Pending At Discharge  Pending Labs       No current pending labs.            Hospital Course   Danelle is a 64-year-old female patient presenting to emergency department for evaluation of cough and congestion.  Patient states that she has a history of COPD and is not on home oxygen, usually uses her nebulizer however her power has been out at home since Thursday.  Patient went to urgent care for evaluation and was found to be hypoxic with an SpO2 of 79%.  Patient was sent to ED for further evaluation.  Patient was placed on high flow oxygen in ED, imaging completed showing chronic changes, negative acute however pulmonary and hilar lymph nodes were found and a PET scan, nonemergent is recommended.  Patient was treated with IV Solu-Medrol and DuoNeb treatments, remains on high flow oxygen, admitted for further medical management.     COPD exacerbation/acute respiratory failure  Inpatient telemetry SDU admission to Dr. Abreu  See imaging results above   Continue high flow oxygen  Nebulizer treatments every 2 hours per RT  Continue Solu-Medrol 40 mg IV push every 8 hours  Mucinex/Tessalon  Telemetry monitoring  COPD navigator     HTN/diabetes/COPD/hypothyroidism/insomnia/HLD/recurrent cold sores/chronic low back pain/right lower extremity insufficiency  #Chronic conditions  Continue home medications as ordered  Diabetic/cardiac diet  Hold home oral antidiabetic medication  Insulin sliding scale  Hypoglycemia protocol     DVT Ppx  SCDs  Heparin subcutaneous  Out of bed with assistance     Patient fully evaluated and on Airvo 60 L 60% FiO2.  Pulmonary consultation is pending.  Continue aggressive pulmonary hygiene  Patient fully evaluated and plan as above. .  Continue aggressive pulmonary hygiene.     1/2: doing well , feeling better, still wheezing, on 5 liters still, contineu steroids     1/3: doing well,  breathign sounds a lot better, wean oxygen, hopefully dc in next 24-48 hrs     1/4: Will give a dose of IV Lasix today as patient appears to have some component of fluid overload.  Continue nebulizers and mucolytic's.  Patient will need outpatient sleep study and CT chest per pulmonology.  Reevaluate tomorrow.     1/5: Leukocytosis improved from 13 down to 11.  Supplemental O2 decreased from 4 L to 3 L.  Patient with a component of contraction alkalosis, will give Diamox along with an additional dose of Lasix today as her creatinine is stable.  Continue to wean O2.       Dc on hoem oxygen, follow up in office in 1 week    Pertinent Physical Exam At Time of Discharge  Physical Exam  Constitutional:       Appearance: Normal appearance.   HENT:      Head: Normocephalic and atraumatic.      Right Ear: Tympanic membrane and ear canal normal.      Left Ear: Tympanic membrane and ear canal normal.      Mouth/Throat:      Mouth: Mucous membranes are moist.      Pharynx: Oropharynx is clear.   Eyes:      Extraocular Movements: Extraocular movements intact.      Conjunctiva/sclera: Conjunctivae normal.      Pupils: Pupils are equal, round, and reactive to light.   Cardiovascular:      Rate and Rhythm: Normal rate and regular rhythm.      Pulses: Normal pulses.      Heart sounds: Normal heart sounds.   Pulmonary:      Effort: Pulmonary effort is normal.      Breath sounds: Normal breath sounds.   Abdominal:      General: Abdomen is flat. Bowel sounds are normal.      Palpations: Abdomen is soft.   Musculoskeletal:         General: Normal range of motion.      Cervical back: Normal range of motion and neck supple.   Skin:     General: Skin is warm and dry.      Capillary Refill: Capillary refill takes 2 to 3 seconds.   Neurological:      General: No focal deficit present.      Mental Status: She is alert and oriented to person, place, and time. Mental status is at baseline.   Psychiatric:         Mood and Affect: Mood normal.          Behavior: Behavior normal.         Thought Content: Thought content normal.         Judgment: Judgment normal.         Home Medications     Medication List      START taking these medications     azithromycin 250 mg tablet; Commonly known as: Zithromax; Take 2 tabs   (500 mg) by mouth today, than 1 tab (250 mg) daily for 4 days.   predniSONE 10 mg tablet; Commonly known as: Deltasone; Take 5 tablets   (50 mg) by mouth once daily for 2 days, THEN 4 tablets (40 mg) once daily   for 2 days, THEN 3 tablets (30 mg) once daily for 2 days, THEN 2 tablets   (20 mg) once daily for 2 days, THEN 1 tablet (10 mg) once daily for 2   days.; Start taking on: January 6, 2025     CHANGE how you take these medications     * ipratropium-albuteroL  mcg/actuation inhaler; Commonly known as:   Combivent Respimat; Inhale 1 puff 4 times a day.; What changed: when to   take this, reasons to take this   * ipratropium-albuteroL 0.5-2.5 mg/3 mL nebulizer solution; Commonly   known as: Duo-Neb; Take 3 mL by nebulization every 4 hours if needed for   wheezing.; What changed: Another medication with the same name was   changed. Make sure you understand how and when to take each.  * This list has 2 medication(s) that are the same as other medications   prescribed for you. Read the directions carefully, and ask your doctor or   other care provider to review them with you.     CONTINUE taking these medications     ammonium lactate 12 % lotion; Commonly known as: Lac-Hydrin   Blood Glucose Test strip; Generic drug: blood sugar diagnostic; Test   once to twice daily   blood-glucose meter misc; Test once to twice daily   cyclobenzaprine 10 mg tablet; Commonly known as: Flexeril; Take 1 tablet   (10 mg) by mouth 3 times a day as needed for muscle spasms.   fluticasone-umeclidin-vilanter 100-62.5-25 mcg blister with device;   Commonly known as: TRELEGY-ELLIPTA; Inhale 1 puff once daily.   furosemide 20 mg tablet; Commonly known as: Lasix;  Take 1 tablet (20 mg)   by mouth once daily as needed (swelling).   lancets misc; Test once daily as directed   levothyroxine 150 mcg tablet; Commonly known as: Synthroid, Levoxyl;   Take 1 tablet (150 mcg) by mouth early in the morning..   lisinopril 5 mg tablet; TAKE 1 TABLET DAILY   metFORMIN 500 mg tablet; Commonly known as: Glucophage; Take 1 tablet   (500 mg) by mouth 2 times a day.   Mounjaro 7.5 mg/0.5 mL pen injector; Generic drug: tirzepatide; Inject   7.5 mg under the skin 1 (one) time per week. Every Sunday   omeprazole 40 mg DR capsule; Commonly known as: PriLOSEC; Take 1 capsule   (40 mg) by mouth once daily as needed (heartburn).   oxybutynin XL 15 mg 24 hr tablet; Commonly known as: Ditropan-XL   PHENobarbital-hyoscyamine-atropine-scopoloamine 16.2-0.1037 -0.0194 mg   tablet; Commonly known as: Donnatal; Take 1-2 tablets by mouth 3 times a   day.   QUEtiapine 50 mg tablet; Commonly known as: SEROquel; Take 1 tablet (50   mg) by mouth once daily at bedtime.   rosuvastatin 40 mg tablet; Commonly known as: Crestor; Take 1 tablet (40   mg) by mouth once daily.   valACYclovir 500 mg tablet; Commonly known as: Valtrex; Take 1 tablet   (500 mg) by mouth 3 times a day as needed (cold sores).     STOP taking these medications     DULoxetine 30 mg DR capsule; Commonly known as: Cymbalta       Outpatient Follow-Up  No future appointments.    Junior Abreu,

## 2025-01-08 ENCOUNTER — PATIENT OUTREACH (OUTPATIENT)
Dept: CARE COORDINATION | Facility: CLINIC | Age: 65
End: 2025-01-08
Payer: COMMERCIAL

## 2025-01-08 LAB
ATRIAL RATE: 68 BPM
P AXIS: 67 DEGREES
P OFFSET: 202 MS
P ONSET: 138 MS
PR INTERVAL: 164 MS
Q ONSET: 220 MS
QRS COUNT: 11 BEATS
QRS DURATION: 84 MS
QT INTERVAL: 418 MS
QTC CALCULATION(BAZETT): 444 MS
QTC FREDERICIA: 436 MS
R AXIS: 94 DEGREES
T AXIS: 62 DEGREES
T OFFSET: 429 MS
VENTRICULAR RATE: 68 BPM

## 2025-01-08 NOTE — PROGRESS NOTES
First attempt made to reach patient for transitions of care outreach and no contact made with patient. Left voicemail with my name and contact information. Patient is to follow up with Junior Abreu DO and pulm.   Discharge Diagnosis  Acute hypoxic respiratory failure (Multi)     Issues Requiring Follow-Up  Copd    Thank you,   Leticia Moses , RN   Nurse Care Manager   [facility]   [phone]

## 2025-01-09 ENCOUNTER — PATIENT OUTREACH (OUTPATIENT)
Dept: CARE COORDINATION | Facility: CLINIC | Age: 65
End: 2025-01-09
Payer: COMMERCIAL

## 2025-01-09 SDOH — ECONOMIC STABILITY: FOOD INSECURITY
ARE ANY OF YOUR NEEDS URGENT? FOR EXAMPLE, UNCERTAINTY OF WHERE YOU WILL GET YOUR NEXT MEAL OR NOT HAVING THE MEDICATIONS YOU NEED TO TAKE TOMORROW.: NO

## 2025-01-09 SDOH — ECONOMIC STABILITY: GENERAL: WOULD YOU LIKE HELP WITH ANY OF THE FOLLOWING NEEDS?: I DONT NEED HELP WITH ANY OF THESE

## 2025-01-09 NOTE — PROGRESS NOTES
Outreach call to patient to support a smooth transition of care from recent admission.  Spoke with patient, reviewed discharge medications, discharge instructions, assessed social needs, and provided education on importance of follow-up appointment with provider.  Will continue to monitor through transition period.     Consult placed for ACO Dietitian . A1C elevated.     Medications  Medications reviewed with patient/caregiver?: Yes (1/9/2025  9:29 AM)  Is the patient having any side effects they believe may be caused by any medication additions or changes?: No (1/9/2025  9:29 AM)  Does the patient have all medications ordered at discharge?: Yes (1/9/2025  9:29 AM)  Care Management Interventions: Provided patient education (1/9/2025  9:29 AM)  Care Management Interventions: Provided patient education (1/9/2025  9:29 AM)    Appointments  Does the patient have a primary care provider?: Yes (1/9/2025  9:29 AM)  Care Management Interventions: Verified appointment date/time/provider (1/9/2025  9:29 AM)  Has the patient kept scheduled appointments due by today?: Yes (1/9/2025  9:29 AM)    Self Management  What is the home health agency?: none for Mrs. Jacobs (1/9/2025  9:29 AM)  Has home health visited the patient within 72 hours of discharge?: Not applicable (1/9/2025  9:29 AM)  What Durable Medical Equipment (DME) was ordered?: has oxygen at home is wearing 3 liters (1/9/2025  9:29 AM)    Patient Teaching  Does the patient have access to their discharge instructions?: Yes (1/9/2025  9:29 AM)  Care Management Interventions: Reviewed instructions with patient (1/9/2025  9:29 AM)  What is the patient's perception of their health status since discharge?: Improving (1/9/2025  9:29 AM)          Thank you,  Leticia Moses , RN   Nurse Care Manager   [facility]   [phone]

## 2025-01-13 ENCOUNTER — APPOINTMENT (OUTPATIENT)
Dept: PRIMARY CARE | Facility: CLINIC | Age: 65
End: 2025-01-13
Payer: COMMERCIAL

## 2025-01-13 VITALS
BODY MASS INDEX: 40.72 KG/M2 | HEART RATE: 113 BPM | OXYGEN SATURATION: 92 % | WEIGHT: 260 LBS | SYSTOLIC BLOOD PRESSURE: 110 MMHG | DIASTOLIC BLOOD PRESSURE: 66 MMHG

## 2025-01-13 DIAGNOSIS — G47.33 OSA (OBSTRUCTIVE SLEEP APNEA): Primary | ICD-10-CM

## 2025-01-13 LAB
ALBUMIN SERPL BCP-MCNC: 3.9 G/DL (ref 3.4–5)
ALP SERPL-CCNC: 100 U/L (ref 33–136)
ALT SERPL W P-5'-P-CCNC: 16 U/L (ref 7–45)
ANION GAP SERPL CALC-SCNC: 15 MMOL/L (ref 10–20)
AST SERPL W P-5'-P-CCNC: 9 U/L (ref 9–39)
BASOPHILS # BLD AUTO: 0.07 X10*3/UL (ref 0–0.1)
BASOPHILS NFR BLD AUTO: 0.4 %
BILIRUB SERPL-MCNC: 0.6 MG/DL (ref 0–1.2)
BUN SERPL-MCNC: 16 MG/DL (ref 6–23)
CALCIUM SERPL-MCNC: 11 MG/DL (ref 8.6–10.6)
CHLORIDE SERPL-SCNC: 96 MMOL/L (ref 98–107)
CO2 SERPL-SCNC: 30 MMOL/L (ref 21–32)
CREAT SERPL-MCNC: 0.96 MG/DL (ref 0.5–1.05)
EGFRCR SERPLBLD CKD-EPI 2021: 65 ML/MIN/1.73M*2
EOSINOPHIL # BLD AUTO: 0.13 X10*3/UL (ref 0–0.7)
EOSINOPHIL NFR BLD AUTO: 0.7 %
ERYTHROCYTE [DISTWIDTH] IN BLOOD BY AUTOMATED COUNT: 15.8 % (ref 11.5–14.5)
GLUCOSE SERPL-MCNC: 244 MG/DL (ref 74–99)
HCT VFR BLD AUTO: 50 % (ref 36–46)
HGB BLD-MCNC: 15 G/DL (ref 12–16)
IMM GRANULOCYTES # BLD AUTO: 0.09 X10*3/UL (ref 0–0.7)
IMM GRANULOCYTES NFR BLD AUTO: 0.5 % (ref 0–0.9)
LYMPHOCYTES # BLD AUTO: 2.31 X10*3/UL (ref 1.2–4.8)
LYMPHOCYTES NFR BLD AUTO: 12.9 %
MCH RBC QN AUTO: 26.9 PG (ref 26–34)
MCHC RBC AUTO-ENTMCNC: 30 G/DL (ref 32–36)
MCV RBC AUTO: 90 FL (ref 80–100)
MONOCYTES # BLD AUTO: 1.03 X10*3/UL (ref 0.1–1)
MONOCYTES NFR BLD AUTO: 5.8 %
NEUTROPHILS # BLD AUTO: 14.21 X10*3/UL (ref 1.2–7.7)
NEUTROPHILS NFR BLD AUTO: 79.7 %
NRBC BLD-RTO: 0 /100 WBCS (ref 0–0)
PLATELET # BLD AUTO: 316 X10*3/UL (ref 150–450)
POTASSIUM SERPL-SCNC: 4.4 MMOL/L (ref 3.5–5.3)
PROT SERPL-MCNC: 7.1 G/DL (ref 6.4–8.2)
RBC # BLD AUTO: 5.58 X10*6/UL (ref 4–5.2)
SODIUM SERPL-SCNC: 137 MMOL/L (ref 136–145)
WBC # BLD AUTO: 17.8 X10*3/UL (ref 4.4–11.3)

## 2025-01-13 PROCEDURE — 99496 TRANSJ CARE MGMT HIGH F2F 7D: CPT | Performed by: STUDENT IN AN ORGANIZED HEALTH CARE EDUCATION/TRAINING PROGRAM

## 2025-01-13 PROCEDURE — 4010F ACE/ARB THERAPY RXD/TAKEN: CPT | Performed by: STUDENT IN AN ORGANIZED HEALTH CARE EDUCATION/TRAINING PROGRAM

## 2025-01-13 PROCEDURE — 85025 COMPLETE CBC W/AUTO DIFF WBC: CPT

## 2025-01-13 PROCEDURE — 1158F ADVNC CARE PLAN TLK DOCD: CPT | Performed by: STUDENT IN AN ORGANIZED HEALTH CARE EDUCATION/TRAINING PROGRAM

## 2025-01-13 PROCEDURE — 3074F SYST BP LT 130 MM HG: CPT | Performed by: STUDENT IN AN ORGANIZED HEALTH CARE EDUCATION/TRAINING PROGRAM

## 2025-01-13 PROCEDURE — 1123F ACP DISCUSS/DSCN MKR DOCD: CPT | Performed by: STUDENT IN AN ORGANIZED HEALTH CARE EDUCATION/TRAINING PROGRAM

## 2025-01-13 PROCEDURE — 1111F DSCHRG MED/CURRENT MED MERGE: CPT | Performed by: STUDENT IN AN ORGANIZED HEALTH CARE EDUCATION/TRAINING PROGRAM

## 2025-01-13 PROCEDURE — 1159F MED LIST DOCD IN RCRD: CPT | Performed by: STUDENT IN AN ORGANIZED HEALTH CARE EDUCATION/TRAINING PROGRAM

## 2025-01-13 PROCEDURE — 3078F DIAST BP <80 MM HG: CPT | Performed by: STUDENT IN AN ORGANIZED HEALTH CARE EDUCATION/TRAINING PROGRAM

## 2025-01-13 PROCEDURE — 1036F TOBACCO NON-USER: CPT | Performed by: STUDENT IN AN ORGANIZED HEALTH CARE EDUCATION/TRAINING PROGRAM

## 2025-01-13 PROCEDURE — 80053 COMPREHEN METABOLIC PANEL: CPT

## 2025-01-13 ASSESSMENT — PATIENT HEALTH QUESTIONNAIRE - PHQ9
1. LITTLE INTEREST OR PLEASURE IN DOING THINGS: NOT AT ALL
SUM OF ALL RESPONSES TO PHQ9 QUESTIONS 1 AND 2: 0
2. FEELING DOWN, DEPRESSED OR HOPELESS: NOT AT ALL

## 2025-01-13 ASSESSMENT — ENCOUNTER SYMPTOMS: DEPRESSION: 0

## 2025-01-13 NOTE — PROGRESS NOTES
Subjective   Patient ID: Danelle Jacobs is a 64 y.o. female who presents for Hospital Follow-up (Acute hypoxic respiratory failure (Multi)/).    HPI   Patient was recently admitted to [facility] from 12/30/24 to 1/6/25 for treatment of acute hypoxic respiratory failure secondary to COPD exacerbation. Patient endorses mild fatigue and mild cough but overall feels like she has been doing well. She completed a course of azithromycin at home. She says she has 4 days left of steroid taper. She was discharged on home oxygen. She is currently on 3L NC only recommended to be used with exertion. Patient says she is sating at 93-95% on RA at home with at rest. Denies headaches, dizzy spells, palpitations, chest pain.    Review of Systems   All other systems reviewed and are negative.    Objective   /66 (BP Location: Left arm, Patient Position: Sitting, BP Cuff Size: Large adult)   Pulse (!) 113   Wt 118 kg (260 lb)   SpO2 92%   BMI 40.72 kg/m²     Physical Exam  Physical Exam  Constitutional:       Appearance: Normal appearance.   HENT:      Head: Normocephalic and atraumatic.      Right Ear: Tympanic membrane and ear canal normal.      Left Ear: Tympanic membrane and ear canal normal.      Mouth/Throat:      Mouth: Mucous membranes are moist.      Pharynx: Oropharynx is clear.   Eyes:      Extraocular Movements: Extraocular movements intact.      Conjunctiva/sclera: Conjunctivae normal.      Pupils: Pupils are equal, round, and reactive to light.   Cardiovascular:      Rate and Rhythm: Normal rate and regular rhythm.      Pulses: Normal pulses.      Heart sounds: Normal heart sounds.   Pulmonary:      Effort: Pulmonary effort is normal.      Breath sounds: Normal breath sounds.   Abdominal:      General: Abdomen is flat. Bowel sounds are normal.      Palpations: Abdomen is soft.   Musculoskeletal:         General: Normal range of motion. Trace lower extremity edema.     Cervical back: Normal range of motion and neck  supple.   Skin:     General: Skin is warm and dry.      Capillary Refill: Capillary refill takes 2 to 3 seconds.   Neurological:      General: No focal deficit present.      Mental Status: She is alert and oriented to person, place, and time. Mental status is at baseline.   Psychiatric:         Mood and Affect: Mood normal.         Behavior: Behavior normal.         Thought Content: Thought content normal.         Judgment: Judgment normal.     Assessment/Plan   1. COPD   - Patient will complete steroid taper at home  - Continue recommended 3L NC with exertion   - Continue Trelegy-Ellipta 1 puff once daily    2. HTN/T2DM, Hypothyroidism/insomnia/HLD/chronic lower back pain/ right lower extremity insufficiency, chronic cold sores.  Patient last A1c in Early Dec 2024 was 8  - Currently on metformin 500 mg BID and mounjaro once a week.  - Continue home medications

## 2025-01-13 NOTE — PROGRESS NOTES
Subjective   Reason for Visit: Danelle Jacosb is an 64 y.o. female here for a Medicare Wellness visit.               HPI    Danelle is a 64-year-old female patient presenting to emergency department for evaluation of cough and congestion. Patient states that she has a history of COPD and is not on home oxygen, usually uses her nebulizer however her power has been out at home since Thursday. Patient went to urgent care for evaluation and was found to be hypoxic with an SpO2 of 79%. Patient was sent to ED for further evaluation. Patient was placed on high flow oxygen in ED, imaging completed showing chronic changes, negative acute however pulmonary and hilar lymph nodes were found and a PET scan, nonemergent is recommended. Patient was treated with IV Solu-Medrol and DuoNeb treatments, remains on high flow oxygen, admitted for further medical management.     Doing much better today since MD procedures with her son breathing stable she does use oxygen at rest to use when moving.  She does not use at night she is sleeping well.  Patient antibiotic course taking her steroids.  She is on Trelegy as well.  She still does not feel able to go to work.  Plan [facility] paperwork for.  Causation labs and imaging was all reviewed in detail.  She does have a lung nodule on CT was also noted to be ago as well will follow-up with us in couple months.    Patient Care Team:  Junior Abreu DO as PCP - General (Internal Medicine)  Zaid Dooley DO as PCP - MMO ACO PCP  Leticia Nicolas RN as Care Manager (Case Management)     Review of Systems   All other systems reviewed and are negative.      Objective   Vitals:  Vitals:    01/13/25 1015   BP: 110/66   Pulse: (!) 113   SpO2: 92%       Wt 118 kg (260 lb)   BMI 40.72 kg/m²       Physical Exam  Constitutional:       Appearance: Normal appearance.   HENT:      Head: Normocephalic and atraumatic.      Right Ear: Tympanic membrane and ear canal normal.      Left Ear: Tympanic membrane  and ear canal normal.      Mouth/Throat:      Mouth: Mucous membranes are moist.      Pharynx: Oropharynx is clear.   Eyes:      Extraocular Movements: Extraocular movements intact.      Conjunctiva/sclera: Conjunctivae normal.      Pupils: Pupils are equal, round, and reactive to light.   Cardiovascular:      Rate and Rhythm: Normal rate and regular rhythm.      Pulses: Normal pulses.      Heart sounds: Normal heart sounds.   Pulmonary:      Effort: Pulmonary effort is normal.      Breath sounds: Normal breath sounds.   Abdominal:      General: Abdomen is flat. Bowel sounds are normal.      Palpations: Abdomen is soft.   Musculoskeletal:         General: Normal range of motion.      Cervical back: Normal range of motion and neck supple.   Skin:     General: Skin is warm and dry.      Capillary Refill: Capillary refill takes 2 to 3 seconds.   Neurological:      General: No focal deficit present.      Mental Status: She is alert and oriented to person, place, and time. Mental status is at baseline.   Psychiatric:         Mood and Affect: Mood normal.         Behavior: Behavior normal.         Thought Content: Thought content normal.         Judgment: Judgment normal.       Assessment & Plan     1. LUISA (obstructive sleep apnea) (Primary)    - Home sleep apnea test (HSAT); Future  - CBC and Auto Differential  - Comprehensive Metabolic Panel    1. COPD   - Patient will complete steroid taper at home  - Continue recommended 3L NC with exertion   - Continue Trelegy-Ellipta 1 puff once daily     2. HTN/T2DM, Hypothyroidism/insomnia/HLD/chronic lower back pain/ right lower extremity insufficiency, chronic cold sores.  Patient last A1c in Early Dec 2024 was 8  - Currently on metformin 500 mg BID and mounjaro once a week.  - Continue home medications

## 2025-01-14 DIAGNOSIS — J20.9 ACUTE BRONCHITIS WITH CHRONIC OBSTRUCTIVE PULMONARY DISEASE (COPD) (MULTI): ICD-10-CM

## 2025-01-14 DIAGNOSIS — E11.9 DM TYPE 2, NOT AT GOAL: ICD-10-CM

## 2025-01-14 DIAGNOSIS — J44.0 ACUTE BRONCHITIS WITH CHRONIC OBSTRUCTIVE PULMONARY DISEASE (COPD) (MULTI): ICD-10-CM

## 2025-01-14 DIAGNOSIS — G45.9 TRANSIENT ISCHEMIC ATTACK: Primary | ICD-10-CM

## 2025-01-17 ENCOUNTER — TELEMEDICINE (OUTPATIENT)
Dept: PHARMACY | Facility: HOSPITAL | Age: 65
End: 2025-01-17
Payer: COMMERCIAL

## 2025-01-17 DIAGNOSIS — E66.9 OBESITY WITH BODY MASS INDEX 30 OR GREATER: ICD-10-CM

## 2025-01-17 DIAGNOSIS — J20.9 ACUTE BRONCHITIS WITH CHRONIC OBSTRUCTIVE PULMONARY DISEASE (COPD) (MULTI): Primary | ICD-10-CM

## 2025-01-17 DIAGNOSIS — G45.9 TRANSIENT ISCHEMIC ATTACK: ICD-10-CM

## 2025-01-17 DIAGNOSIS — F17.200 NICOTINE DEPENDENCE, UNCOMPLICATED, UNSPECIFIED NICOTINE PRODUCT TYPE: ICD-10-CM

## 2025-01-17 DIAGNOSIS — J96.01 ACUTE HYPOXIC RESPIRATORY FAILURE (MULTI): ICD-10-CM

## 2025-01-17 DIAGNOSIS — I73.9 PERIPHERAL ARTERY INSUFFICIENCY (CMS-HCC): ICD-10-CM

## 2025-01-17 DIAGNOSIS — E11.9 DM TYPE 2, NOT AT GOAL: ICD-10-CM

## 2025-01-17 DIAGNOSIS — K58.9 IRRITABLE BOWEL SYNDROME, UNSPECIFIED TYPE: ICD-10-CM

## 2025-01-17 DIAGNOSIS — R05.3 PERSISTENT COUGH: ICD-10-CM

## 2025-01-17 DIAGNOSIS — I73.9: ICD-10-CM

## 2025-01-17 DIAGNOSIS — R11.2 NAUSEA AND VOMITING, UNSPECIFIED VOMITING TYPE: ICD-10-CM

## 2025-01-17 DIAGNOSIS — I10 PRIMARY HYPERTENSION: ICD-10-CM

## 2025-01-17 DIAGNOSIS — J44.0 ACUTE BRONCHITIS WITH CHRONIC OBSTRUCTIVE PULMONARY DISEASE (COPD) (MULTI): Primary | ICD-10-CM

## 2025-01-17 RX ORDER — TIRZEPATIDE 10 MG/.5ML
10 INJECTION, SOLUTION SUBCUTANEOUS WEEKLY
Qty: 2 ML | Refills: 2 | Status: SHIPPED | OUTPATIENT
Start: 2025-01-17

## 2025-01-17 RX ORDER — FUROSEMIDE 20 MG/1
20 TABLET ORAL DAILY PRN
Qty: 90 TABLET | Refills: 3 | Status: SHIPPED | OUTPATIENT
Start: 2025-01-17

## 2025-01-17 ASSESSMENT — ENCOUNTER SYMPTOMS
VISUAL CHANGE: 0
POLYDIPSIA: 1
FATIGUE: 1
POLYPHAGIA: 0
BLURRED VISION: 0
WEAKNESS: 1

## 2025-01-17 NOTE — PROGRESS NOTES
Pharmacy Post-Discharge Clinical Pharmacist Appointment    Patient ID: [id number]  Danelle Jacobs is a 64 y.o. female who presents for First appointment. She was referred to Clinical Pharmacy Team to complete a post-discharge medication optimization and monitoring visit.  Reason for Referral: cost assistance with trelegy and Mounjaro Referring Provider: Junior Abreu DO  PCP: Junior Abreu DO   Last visit with PCP: 1/13/25   LUISA (obstructive sleep apnea) (Primary): Home sleep apnea test (HSAT), CMP, CBC w/diff  COPD: Patient will complete steroid taper at home, Continue recommended 3L NC with exertion, Continue Trelegy-Ellipta 1 puff once daily  HTN/T2DM, Hypothyroidism/insomnia/HLD/chronic lower back pain/ right lower extremity insufficiency, chronic cold sores, Patient last A1c in Early Dec 2024 was 8, Currently on metformin 500 mg BID and mounjaro once a week  Next visit with PCP: 2/3/25    Recent Hospitalization 12/30/2024 - 1/6/2025 (7 days) at [facility]  Acute hypoxic respiratory failure (Multi).   Cough and congestion. Patient states that she has a history of COPD and is not on home oxygen, usually uses her nebulizer however her power has been out at home since Thursday. Patient went to urgent care for evaluation and was found to be hypoxic with an SpO2 of 79%. Patient was sent to ED for further evaluation. Patient was placed on high flow oxygen in ED, imaging completed showing chronic changes, negative acute however pulmonary and hilar lymph nodes were found and a PET scan, nonemergent is recommended. Patient was treated with IV Solu-Medrol and DuoNeb treatments, remains on high flow oxygen, admitted for further medical management.   Suspected obesity hypoventilation syndrome  Acute hypoxic respiratory failure  Opacity c/f atelectasis vs neoplasm  COPD  Pulmonology Recommendation:  Follow up CT chest and sleep study outpatient  Consider BiPAP in the setting of chronic CO2 retention due to  hypoventilation.    Subjective     Past Medical History:   Diagnosis Date    Bronchopneumonia, unspecified organism     Bronchial pneumonia    Cellulitis of right lower limb     Cellulitis of right leg    COPD (chronic obstructive pulmonary disease) (Multi)     Diabetes mellitus (Multi)     Disease of thyroid gland     Fatty (change of) liver, not elsewhere classified     Fatty infiltration of liver    Hypertension     Localized edema 03/04/2015    Leg edema    Other long term (current) drug therapy     Long-term use of high-risk medication    Other malaise     Malaise and fatigue    Otitis media, unspecified, left ear     Left otitis media    Personal history of diseases of the skin and subcutaneous tissue     History of actinic keratosis    Personal history of other diseases of the circulatory system     History of rheumatic fever    Personal history of other diseases of the female genital tract     History of ovarian cyst    Personal history of other diseases of the nervous system and sense organs     History of sciatica    Personal history of other diseases of the nervous system and sense organs     History of carpal tunnel syndrome    Personal history of other diseases of the respiratory system     History of bronchitis    Personal history of other diseases of the respiratory system     History of sinusitis    Personal history of other diseases of the respiratory system     History of chronic obstructive lung disease    Personal history of other endocrine, nutritional and metabolic disease     History of hypothyroidism    Personal history of other endocrine, nutritional and metabolic disease     History of hyperlipidemia    Personal history of other infectious and parasitic diseases     History of herpes labialis    Personal history of other mental and behavioral disorders     History of depression      Social Hx:  Reports that she quit smoking about 7 years ago. Her smoking use included cigarettes. She has  never used smokeless tobacco. She reports that she does not currently use alcohol. She reports that she does not use drugs.     Family Hx:  Family History   Problem Relation Name Age of Onset    Other (alzheimers dementia) Mother      Other (cerebrovascular accident) Father      Other (htn) Father         COPD  Patient has been diagnosed with: COPD and Acute exacerbation of chronic bronchitis  Does patient see pulmonology: No March 18, 2025  has not had PFT's completed in last 2 years    Current Regimen  3 L of supplemental O2  Trelegy once daily  Combivent QID prn  Duonebs prn    Clarifications to above regimen: 3 L oxygen with exertion, takes combivent prn  Adverse Effects: none   Appropriate technique? Yes  Prior to hospitalization the patient was only taking trelegy prn and not daily. Now that she takes this daily she feels like it has helped decrease SOB, wheezing, etc    Historical Treatment  Was not on oxygen prior to recent hospitalization    Symptom Management  Current symptoms: dyspnea, cough, wheezing, and fatigue  Triggers: exertion and tired cold  Alleviating factors: oxygen and nebulizer    Exacerbation Hx  When was your last hospitalization for an exacerbation? 1/6 discharge  Last day of abx was Tuesday  Today was last dose of steroid    Rescue Inhaler Use  How often do you use your rescue inhaler? 2 x a day    Immunization History:  Influenza: Date [2024]  COVID: Date [DUE]  RSV: Date [2024]    Diabetes  She presents for her initial diabetic visit. She has type 2 diabetes mellitus. The initial diagnosis of diabetes was made 6 years ago. Her disease course has been improving. Associated symptoms include fatigue, polydipsia and weakness. Pertinent negatives for diabetes include no blurred vision, no chest pain, no foot paresthesias, no foot ulcerations, no polyphagia, no polyuria and no visual change. Symptoms are stable. Risk factors for coronary artery disease include hypertension, obesity, tobacco  exposure, dyslipidemia and diabetes mellitus. Current diabetic treatments: Mounjaro 7.5 mg once weekly and metformin 500 mg twice daily.     Known diabetic complications: peripheral neuropathy, cardiovascular disease, cerebrovascular disease, peripheral vascular disease, and obesity  Does patient follow with Endocrinology: No  **-- patient denies sores or cuts on feet today     Current diabetic medications include:  Mounjaro 7.5 mg once weekly  Metformin 500 mg twice daily      Clarifications to above regimen: takes Mounjaro on Sundays  Adverse Effects: GI upset with metformin    Glucose Readings: patient does not check sugars she states she just got a new meter and hasn't started using it  Glucometer/CGM Type: Trumetrix    BMI: 40; >/= 35? Yes, Class 3 obesity  Current Weight:  260 lbs    Lifestyle: will assess at next appt    Secondary Prevention:  Statin? Yes - atorvastatin 40 mg daily  ACE-I/ARB? Yes lisinopril 5 mg daily  Aspirin? No    Pertinent PMH Review:  PMH of Pancreatitis: No  PMH of Retinopathy: No  PMH of Urinary Tract Infections: Yes  PMH of MTC: No  HF: No  _____________________________________________________________________________      PHARMACIST MEDICATION REVIEW    Drug Interactions  The following drug interactions were noted:    High anticholinergic burden in elderly  In patients over 65 years of age these can cause adverse events, such as confusion, dizziness and falls. These have been shown to increase patient mortality.  Anticholinergic Calculator   A score of 3+ is associated with an increased cognitive impairment and mortality  Patient's Score - 12  Scheduled medications  Quetiapine = 3   Recommend to trial trazodone as an alternative  Oxybutynin = 3  PRN  Cyclobenzaprine = 3  Donnatal = 3    Medication System Management  Patient's preferred pharmacy:   [facility]  Medication Access:  Combivent has $35 copay- copay coupon is for $35 copay  Patient states [employer] sent her a letter  that they weren't covering this? Test billing shows it is covered  Mounjaro  copay card  RXBIN: [id number]  PCN: [id number]  GRP: [id number]  ID: [id number]  Expiration Date: 12/31/2025  [facility] has $50 copay  Signed patient up for Copay card  BIN: [id number]  PCN: [id number]  Grp: [id number]  ID: [id number]  Adherence: satisfactory  Prior was only taking trelegy prn, but after hospitalization she has been taking it correctly  Have you had any missed doses? No  Allergies   Reviewed? Yes  Changes? No    Medication Reconciliation:  OTCs? none  _____________________________________________________________________________  Objective     Lab Review  Hepatic dysfunction?  Yes, LFTs wnl but patient has either active/past steatosis liver  Kidney  Use adjusted body weight  IBW: 135lbs  AdjBW: 185 lbs    CrCl: 167; GFR: 65 (77)    CT angio chest for pulmonary embolism Result Date: 12/31/2024  Chronic changes suggesting COPD and mild emphysema with bronchial wall thickening in the bilateral lower lobes and right upper lobe which may indicate acute or chronic bronchitis. Subpleural area of nodularity in the lateral aspect of the right lower lobe measuring up to 2.1 cm which may represent rounded atelectasis however neoplasm is not entirely excluded. Further evaluation is recommended with nonemergent PET/CT. Right upper lobe pleural parenchymal scarring. Prominent but nonpathologically enlarged mediastinal and bilateral hilar lymph nodes, most likely reactive however these can also be better assessed on PET/CT. Pronounced cardiomegaly.   Medication Review  Current Outpatient Medications   Medication Instructions    ammonium lactate (Lac-Hydrin) 12 % lotion As needed    blood sugar diagnostic (Blood Glucose Test) strip Test once to twice daily    blood-glucose meter misc Test once to twice daily    cyclobenzaprine (FLEXERIL) 10 mg, oral, 3 times daily PRN    fluticasone-umeclidin-vilanter (TRELEGY-ELLIPTA) 100-62.5-25 mcg blister with device 1 puff,  inhalation, Daily    furosemide (LASIX) 20 mg, oral, Daily PRN    ipratropium-albuteroL (Combivent Respimat)  mcg/actuation inhaler 1 puff, inhalation, 4 times daily RT    ipratropium-albuteroL (Duo-Neb) 0.5-2.5 mg/3 mL nebulizer solution 3 mL, nebulization, Every 4 hours PRN    lancets misc Test once daily as directed    levothyroxine (SYNTHROID, LEVOXYL) 150 mcg, oral, Daily    lisinopril 5 mg, oral, Daily    metFORMIN (GLUCOPHAGE) 500 mg, oral, 2 times daily    omeprazole (PRILOSEC) 40 mg, oral, Daily PRN    oxybutynin XL (Ditropan-XL) 15 mg 24 hr tablet 1 tablet, Daily (0630)    PHENobarbital-hyoscyamine-atropine-scopoloamine (Donnatal) 16.2-0.1037 -0.0194 mg tablet 1-2 tablets, oral, 3 times daily    QUEtiapine (SEROQUEL) 50 mg, oral, Nightly    rosuvastatin (CRESTOR) 40 mg, oral, Daily    valACYclovir (VALTREX) 500 mg, oral, 3 times daily PRN      Vitals  BP Readings from Last 2 Encounters:   01/13/25 110/66   01/06/25 126/57     BMI Readings from Last 1 Encounters:   01/13/25 40.72 kg/m²      Labs  A1C  Lab Results   Component Value Date    HGBA1C 8.0 (H) 12/10/2024    HGBA1C 7.9 (H) 08/06/2024    HGBA1C 11.1 (H) 05/01/2024     BMP  Lab Results   Component Value Date    CALCIUM 11.0 (H) 01/13/2025     01/13/2025    K 4.4 01/13/2025    CO2 30 01/13/2025    CL 96 (L) 01/13/2025    BUN 16 01/13/2025    CREATININE 0.96 01/13/2025    EGFR 65 01/13/2025     LFTs  Lab Results   Component Value Date    ALT 16 01/13/2025    AST 9 01/13/2025    ALKPHOS 100 01/13/2025    BILITOT 0.6 01/13/2025     FLP  Lab Results   Component Value Date    TRIG 258 (H) 09/12/2023    CHOL 138 09/12/2023    LDLF 48 09/12/2023    HDL 38.7 (A) 09/12/2023     Urine Microalbumin  Lab Results   Component Value Date    MICROALBCREA 13.0 09/12/2023     Weight Management  Wt Readings from Last 3 Encounters:   01/13/25 118 kg (260 lb)   12/30/24 120 kg (265 lb)   12/30/24 120 kg (265 lb)       _____________________________________________________________________________    Assessment/Plan   Problem List Items Addressed This Visit       Acute bronchitis with chronic obstructive pulmonary disease (COPD) (Multi) - Primary    Nicotine dependence    Obesity with body mass index 30 or greater    Persistent cough    DM type 2, not at goal    HTN (hypertension)    Irritable bowel syndrome    Nausea and vomiting    Peripheral artery insufficiency (CMS-HCC)    Transient ischemic attack    Acute hypoxic respiratory failure (Multi)     Patient Goals  Fasting B - 130 mg/dL  Postprandial BG: less than 180 mg/dL  A1c: less than 7%;  Is pt at goal? No, 8  Patient's SMBGs are unknown    COPD  Patient with COPD that is improved, loss of control due to intercurrent illness, needs further observation, and needs improvement.  Medication Changes:  CONTINUE  All medications  Monitoring and Education:  Adherence   Rescue inhaler/nebulizer use  Supplemental O2 use  Symptoms of COPD  Exacerbations    DIABETES MELLITUS TYPE 2   Diabetes mellitus Type II, under unsatisfactory control. Patient's hhba1c has worsened to 8.0% on 12/10/24 from 7.9% in 2024. Patient denies ADRs from mounjaro but does endorse diarrhea from metformin. Patient agreeable to increased dose.  Reinforced healthy diet, lifestyle, and exercise.  Prescription medication ordered.  Lab work ordered.  Rationale: Further titrate mounjaro for further glycemic benefit, T2DM disease control, weight loss, and CVD/renal protection  Candidate for GLP? Yes; on mounjaro 7.5 mg   Candidate for SGLT2? potentially but does have a history of UTIs  Candidate for DPP4? No; not on glp-1  Candidate for Metformin? Yes; patient on 500 mg BID  Candidate for Insulin? No; A1c >/= 11? No  Medication Changes:  INCREASE Mounjaro to 12.5 mg once weekly  Future Considerations:  Plan to discontinue metformin after repeat hba1c   Monitoring  RFP, LFTs, weight, bmi, hba1c,  lipids  Discussed general issues about diabetes pathophysiology and management.  Addressed ADA diet.  Suggested low cholesterol diet.  Encouraged aerobic exercise.  Discussed foot care.  Education:   Diet   Exercise    Clinical Pharmacist follow-up: 4 weeks, Telehealth visit  Upcoming Appointments: PCP 2/3/25    Continue all meds under the continuation of care with the referring provider and clinical pharmacy team.    Thank you,  Jeanette Parekh, PharmD   Clinical Pharmacist Specialist, Primary Care   Phone: [phone]   Fax: [phone]   Email: [email or web address]    Time Spent  Prep time on day of patient encounter: 20 minutes  Time spent directly with patient, family or caregiver: 29 minutes  Additional Time Spent on Patient Care Activities: 10 minutes  Documentation Time: 30 minutes    Verbal consent to manage patient's drug therapy was obtained from the patient. They were informed they may decline to participate or withdraw from participation in pharmacy services at any time

## 2025-01-17 NOTE — PATIENT INSTRUCTIONS
NAME: Danelle Jacobs   DATE: 2025     Your Pharmacist Today: Jeanette Parekh, Deirdre  Your Primary Care Physician: Junior Abreu,    Your Referring Provider: Junior Abreu,     Thank you for your time today, Ms. Danelle Jacobs. It was a pleasure managing your health together! Below is a summary of your treatment plan, other important information, and our pharmacy team's contact numbers:  If you need to schedule or re-schedule an appointment with us, please call our scheduling line at [phone], option 1.   To schedule non-pharmacy appointments please call [phone]  If you are out of medication refills or have a medical question, please contact me at my direct line, [phone]. Please allow for up to 48 hours for a return call. You can also contact me through [employer].    DIAGNOSIS:    Problem List Items Addressed This Visit       Acute bronchitis with chronic obstructive pulmonary disease (COPD) (Multi) - Primary    Nicotine dependence    Obesity with body mass index 30 or greater    Persistent cough    DM type 2, not at goal    HTN (hypertension)    Irritable bowel syndrome    Nausea and vomiting    Peripheral artery insufficiency (CMS-HCC)    Transient ischemic attack    Acute hypoxic respiratory failure (Multi)     TREATMENT PLAN     Medication Changes:   INCREASE Mounjaro to 12.5 mg once weekly     Patient Goals  Diabetes  Fasting B - 130 mg/dL  Postprandial BG: less than 180 mg/dL  A1c: less than 6.5%   Weight Loss  BMI <25  2.5% weight loss in one month   At least 10% weight loss in 6 months  Cholesterol  LDL-C <55  TG <150   Blood Pressure   /80      Referrals:  I am referring you to:   Make an appt with pulmonologist  Complete annual diabetic foot and eye exam    Follow-up Appointment:   Follow-up with me in 1 month.    Mounjaro copay card  RXBIN: [id number]  PCN: [id number]  GRP: [id number]  ID: [id number]  Expiration Date: 2025  Trelegy Copay card  BIN: [id number]  PCN: [id number]  Grp:  [id number]  ID: [id number]    IMPORTANT INFORMATION     Please call 911 for medical emergencies.  Our pharmacy team is generally available from Monday-Friday, 8 am - 5 pm.  If you need to get in touch with me, you may either call me at my direct line or you can use [employer].  If you are unable to make your appointment, kindly call your our pharmacy scheduling line at [phone] at least 48 hours in advance to cancel and reschedule.  There are no supporting services by the pharmacy team on weekends and holidays, or after 5 PM on weekdays.      RETAIL PHARMACY     [facility]    [address]  [place] [id number]  Phone: [phone]  Hours: M-F: 8 am - 6 pm;   Saturday: 8 am - 4 pm; Sunday 9 am - 1 pm

## 2025-01-22 ENCOUNTER — PATIENT OUTREACH (OUTPATIENT)
Dept: CARE COORDINATION | Facility: CLINIC | Age: 65
End: 2025-01-22
Payer: COMMERCIAL

## 2025-01-28 ENCOUNTER — TELEPHONE (OUTPATIENT)
Dept: PRIMARY CARE | Facility: CLINIC | Age: 65
End: 2025-01-28
Payer: COMMERCIAL

## 2025-01-28 DIAGNOSIS — G47.33 OSA (OBSTRUCTIVE SLEEP APNEA): ICD-10-CM

## 2025-01-28 NOTE — TELEPHONE ENCOUNTER
The order for her sleep study test was cancelled because her insurance said based on the order she doesn't meet the requirements. Can see in scanned docs 1/28/25 what the authorization said   Please advise

## 2025-02-03 ENCOUNTER — APPOINTMENT (OUTPATIENT)
Dept: PRIMARY CARE | Facility: CLINIC | Age: 65
End: 2025-02-03
Payer: COMMERCIAL

## 2025-02-03 VITALS
WEIGHT: 257 LBS | BODY MASS INDEX: 40.34 KG/M2 | OXYGEN SATURATION: 94 % | SYSTOLIC BLOOD PRESSURE: 138 MMHG | HEART RATE: 88 BPM | DIASTOLIC BLOOD PRESSURE: 82 MMHG | HEIGHT: 67 IN

## 2025-02-03 DIAGNOSIS — G47.33 OSA (OBSTRUCTIVE SLEEP APNEA): Primary | ICD-10-CM

## 2025-02-03 DIAGNOSIS — Z00.00 ROUTINE GENERAL MEDICAL EXAMINATION AT A HEALTH CARE FACILITY: ICD-10-CM

## 2025-02-03 PROCEDURE — 1111F DSCHRG MED/CURRENT MED MERGE: CPT | Performed by: STUDENT IN AN ORGANIZED HEALTH CARE EDUCATION/TRAINING PROGRAM

## 2025-02-03 PROCEDURE — 99397 PER PM REEVAL EST PAT 65+ YR: CPT | Performed by: STUDENT IN AN ORGANIZED HEALTH CARE EDUCATION/TRAINING PROGRAM

## 2025-02-03 PROCEDURE — 3008F BODY MASS INDEX DOCD: CPT | Performed by: STUDENT IN AN ORGANIZED HEALTH CARE EDUCATION/TRAINING PROGRAM

## 2025-02-03 PROCEDURE — 4010F ACE/ARB THERAPY RXD/TAKEN: CPT | Performed by: STUDENT IN AN ORGANIZED HEALTH CARE EDUCATION/TRAINING PROGRAM

## 2025-02-03 PROCEDURE — 1159F MED LIST DOCD IN RCRD: CPT | Performed by: STUDENT IN AN ORGANIZED HEALTH CARE EDUCATION/TRAINING PROGRAM

## 2025-02-03 PROCEDURE — 1036F TOBACCO NON-USER: CPT | Performed by: STUDENT IN AN ORGANIZED HEALTH CARE EDUCATION/TRAINING PROGRAM

## 2025-02-03 PROCEDURE — 3079F DIAST BP 80-89 MM HG: CPT | Performed by: STUDENT IN AN ORGANIZED HEALTH CARE EDUCATION/TRAINING PROGRAM

## 2025-02-03 PROCEDURE — 3075F SYST BP GE 130 - 139MM HG: CPT | Performed by: STUDENT IN AN ORGANIZED HEALTH CARE EDUCATION/TRAINING PROGRAM

## 2025-02-03 PROCEDURE — 1123F ACP DISCUSS/DSCN MKR DOCD: CPT | Performed by: STUDENT IN AN ORGANIZED HEALTH CARE EDUCATION/TRAINING PROGRAM

## 2025-02-03 ASSESSMENT — PATIENT HEALTH QUESTIONNAIRE - PHQ9
SUM OF ALL RESPONSES TO PHQ9 QUESTIONS 1 AND 2: 0
2. FEELING DOWN, DEPRESSED OR HOPELESS: NOT AT ALL
1. LITTLE INTEREST OR PLEASURE IN DOING THINGS: NOT AT ALL

## 2025-02-03 ASSESSMENT — ENCOUNTER SYMPTOMS: DEPRESSION: 0

## 2025-02-03 NOTE — PROGRESS NOTES
Subjective   Patient ID: Danelle Jacobs is a 64 y.o. female who presents for Follow-up.    HPI     Danelle is a 64-year-old female patient presenting to emergency department for evaluation of cough and congestion. Patient states that she has a history of COPD and is not on home oxygen, usually uses her nebulizer however her power has been out at home since Thursday. Patient went to urgent care for evaluation and was found to be hypoxic with an SpO2 of 79%. Patient was sent to ED for further evaluation. Patient was placed on high flow oxygen in ED, imaging completed showing chronic changes, negative acute however pulmonary and hilar lymph nodes were found and a PET scan, nonemergent is recommended. Patient was treated with IV Solu-Medrol and DuoNeb treatments, remains on high flow oxygen, admitted for further medical management.      Doing much better today since MD procedures with her son breathing stable she does use oxygen at rest to use when moving.  She does not use at night she is sleeping well.  Patient antibiotic course taking her steroids.  She is on Trelegy as well.  She still does not feel able to go to work.  Plan [facility] paperwork for.  Causation labs and imaging was all reviewed in detail.  She does have a lung nodule on CT was also noted to be ago as well will follow-up with us in couple months.    Still sleeps and falls asleep doing the day, snores at night excessive daytime sleepiness, still on oxygen, but doing well         Review of Systems   All other systems reviewed and are negative.      Objective   /82 (BP Location: Left arm, Patient Position: Sitting, BP Cuff Size: Large adult)   Pulse 88   Wt 117 kg (257 lb)   SpO2 94%   BMI 40.25 kg/m²     Physical Exam  Constitutional:       Appearance: Normal appearance.   HENT:      Head: Normocephalic and atraumatic.      Right Ear: Tympanic membrane and ear canal normal.      Left Ear: Tympanic membrane and ear canal normal.      Mouth/Throat:       Mouth: Mucous membranes are moist.      Pharynx: Oropharynx is clear.   Eyes:      Extraocular Movements: Extraocular movements intact.      Conjunctiva/sclera: Conjunctivae normal.      Pupils: Pupils are equal, round, and reactive to light.   Cardiovascular:      Rate and Rhythm: Normal rate and regular rhythm.      Pulses: Normal pulses.      Heart sounds: Normal heart sounds.   Pulmonary:      Effort: Pulmonary effort is normal.      Breath sounds: Normal breath sounds.   Abdominal:      General: Abdomen is flat. Bowel sounds are normal.      Palpations: Abdomen is soft.   Musculoskeletal:         General: Normal range of motion.      Cervical back: Normal range of motion and neck supple.   Skin:     General: Skin is warm and dry.      Capillary Refill: Capillary refill takes 2 to 3 seconds.   Neurological:      General: No focal deficit present.      Mental Status: She is alert and oriented to person, place, and time. Mental status is at baseline.   Psychiatric:         Mood and Affect: Mood normal.         Behavior: Behavior normal.         Thought Content: Thought content normal.         Judgment: Judgment normal.       Assessment/Plan   1. LUISA (obstructive sleep apnea) (Primary)    - In-Center Sleep Study (Non-Sleep Provider Only); Future  - need in house sleep study, discussed with sleep PA      2. Routine general medical examination at a health care facility    - CBC and Auto Differential  - Comprehensive Metabolic Panel  - Lipid Panel  - TSH with reflex to Free T4 if abnormal  - Hemoglobin A1C

## 2025-02-04 LAB
ALBUMIN SERPL-MCNC: 4.1 G/DL (ref 3.6–5.1)
ALP SERPL-CCNC: 88 U/L (ref 37–153)
ALT SERPL-CCNC: 20 U/L (ref 6–29)
ANION GAP SERPL CALCULATED.4IONS-SCNC: 10 MMOL/L (CALC) (ref 7–17)
AST SERPL-CCNC: 20 U/L (ref 10–35)
BASOPHILS # BLD AUTO: 88 CELLS/UL (ref 0–200)
BASOPHILS NFR BLD AUTO: 1 %
BILIRUB SERPL-MCNC: 0.5 MG/DL (ref 0.2–1.2)
BUN SERPL-MCNC: 16 MG/DL (ref 7–25)
CALCIUM SERPL-MCNC: 10.8 MG/DL (ref 8.6–10.4)
CHLORIDE SERPL-SCNC: 102 MMOL/L (ref 98–110)
CHOLEST SERPL-MCNC: 121 MG/DL
CHOLEST/HDLC SERPL: 3.4 (CALC)
CO2 SERPL-SCNC: 30 MMOL/L (ref 20–32)
CREAT SERPL-MCNC: 0.98 MG/DL (ref 0.5–1.05)
EGFRCR SERPLBLD CKD-EPI 2021: 64 ML/MIN/1.73M2
EOSINOPHIL # BLD AUTO: 202 CELLS/UL (ref 15–500)
EOSINOPHIL NFR BLD AUTO: 2.3 %
ERYTHROCYTE [DISTWIDTH] IN BLOOD BY AUTOMATED COUNT: 14.7 % (ref 11–15)
EST. AVERAGE GLUCOSE BLD GHB EST-MCNC: 240 MG/DL
EST. AVERAGE GLUCOSE BLD GHB EST-SCNC: 13.3 MMOL/L
GLUCOSE SERPL-MCNC: 137 MG/DL (ref 65–99)
HBA1C MFR BLD: 10 % OF TOTAL HGB
HCT VFR BLD AUTO: 46 % (ref 35–45)
HDLC SERPL-MCNC: 36 MG/DL
HGB BLD-MCNC: 14.6 G/DL (ref 11.7–15.5)
LDLC SERPL CALC-MCNC: 56 MG/DL (CALC)
LYMPHOCYTES # BLD AUTO: 2429 CELLS/UL (ref 850–3900)
LYMPHOCYTES NFR BLD AUTO: 27.6 %
MCH RBC QN AUTO: 27.2 PG (ref 27–33)
MCHC RBC AUTO-ENTMCNC: 31.7 G/DL (ref 32–36)
MCV RBC AUTO: 85.7 FL (ref 80–100)
MONOCYTES # BLD AUTO: 449 CELLS/UL (ref 200–950)
MONOCYTES NFR BLD AUTO: 5.1 %
NEUTROPHILS # BLD AUTO: 5632 CELLS/UL (ref 1500–7800)
NEUTROPHILS NFR BLD AUTO: 64 %
NONHDLC SERPL-MCNC: 85 MG/DL (CALC)
PLATELET # BLD AUTO: 259 THOUSAND/UL (ref 140–400)
PMV BLD REES-ECKER: 10.2 FL (ref 7.5–12.5)
POTASSIUM SERPL-SCNC: 3.8 MMOL/L (ref 3.5–5.3)
PROT SERPL-MCNC: 7.1 G/DL (ref 6.1–8.1)
RBC # BLD AUTO: 5.37 MILLION/UL (ref 3.8–5.1)
SODIUM SERPL-SCNC: 142 MMOL/L (ref 135–146)
T4 FREE SERPL-MCNC: 1.2 NG/DL (ref 0.8–1.8)
TRIGL SERPL-MCNC: 250 MG/DL
TSH SERPL-ACNC: 12.97 MIU/L (ref 0.4–4.5)
WBC # BLD AUTO: 8.8 THOUSAND/UL (ref 3.8–10.8)

## 2025-02-11 ENCOUNTER — APPOINTMENT (OUTPATIENT)
Dept: PHARMACY | Facility: HOSPITAL | Age: 65
End: 2025-02-11
Payer: COMMERCIAL

## 2025-02-11 ENCOUNTER — APPOINTMENT (OUTPATIENT)
Dept: SLEEP MEDICINE | Facility: CLINIC | Age: 65
End: 2025-02-11
Payer: COMMERCIAL

## 2025-02-11 DIAGNOSIS — R05.3 PERSISTENT COUGH: ICD-10-CM

## 2025-02-11 DIAGNOSIS — I10 PRIMARY HYPERTENSION: ICD-10-CM

## 2025-02-11 DIAGNOSIS — G45.9 TRANSIENT ISCHEMIC ATTACK: ICD-10-CM

## 2025-02-11 DIAGNOSIS — J44.0 ACUTE BRONCHITIS WITH CHRONIC OBSTRUCTIVE PULMONARY DISEASE (COPD) (MULTI): ICD-10-CM

## 2025-02-11 DIAGNOSIS — J20.9 ACUTE BRONCHITIS WITH CHRONIC OBSTRUCTIVE PULMONARY DISEASE (COPD) (MULTI): ICD-10-CM

## 2025-02-11 DIAGNOSIS — I73.9 PERIPHERAL ARTERY INSUFFICIENCY (CMS-HCC): ICD-10-CM

## 2025-02-11 DIAGNOSIS — J96.01 ACUTE HYPOXIC RESPIRATORY FAILURE (MULTI): ICD-10-CM

## 2025-02-11 DIAGNOSIS — F17.200 NICOTINE DEPENDENCE, UNCOMPLICATED, UNSPECIFIED NICOTINE PRODUCT TYPE: ICD-10-CM

## 2025-02-11 DIAGNOSIS — E11.9 DM TYPE 2, NOT AT GOAL: Primary | ICD-10-CM

## 2025-02-11 DIAGNOSIS — E66.9 OBESITY WITH BODY MASS INDEX 30 OR GREATER: ICD-10-CM

## 2025-02-11 RX ORDER — TIRZEPATIDE 12.5 MG/.5ML
12.5 INJECTION, SOLUTION SUBCUTANEOUS WEEKLY
Qty: 2 ML | Refills: 1 | Status: SHIPPED | OUTPATIENT
Start: 2025-02-11

## 2025-02-11 ASSESSMENT — ENCOUNTER SYMPTOMS
POLYDIPSIA: 1
VISUAL CHANGE: 0
BLURRED VISION: 0
POLYPHAGIA: 0
FATIGUE: 1
WEAKNESS: 1

## 2025-02-11 NOTE — PROGRESS NOTES
Pharmacy Post-Discharge Clinical Pharmacist Appointment    Patient ID: [id number]  Danelle Jacobs is a 64 y.o. female who presents for Follow Up appointment. She was referred to Clinical Pharmacy Team to complete a post-discharge medication optimization and monitoring visit.  Reason for Referral: cost assistance with trelegy and Mounjaro   Referring Provider and PCP: Junior Abreu DO   Last visit with PCP: 2/3/25  Patient states that she has a history of COPD and is not on home oxygen, usually uses her nebulizer however her power has been out at home since Thursday. Patient went to urgent care for evaluation and was found to be hypoxic with an SpO2 of 79%. Patient was sent to ED for further evaluation. Patient was placed on high flow oxygen in ED, imaging completed showing chronic changes, negative acute however pulmonary and hilar lymph nodes were found and a PET scan, nonemergent is recommended. Patient was treated with IV Solu-Medrol and DuoNeb treatments, remains on high flow oxygen, admitted for further medical management.    Next visit with PCP: 2/3/25    Recent Hospitalization 12/30/2024 - 1/6/2025 (7 days) at [facility]  Acute hypoxic respiratory failure (Multi).   Cough and congestion. Patient states that she has a history of COPD and is not on home oxygen, usually uses her nebulizer however her power has been out at home since Thursday. Patient went to urgent care for evaluation and was found to be hypoxic with an SpO2 of 79%. Patient was sent to ED for further evaluation. Patient was placed on high flow oxygen in ED, imaging completed showing chronic changes, negative acute however pulmonary and hilar lymph nodes were found and a PET scan, nonemergent is recommended. Patient was treated with IV Solu-Medrol and DuoNeb treatments, remains on high flow oxygen, admitted for further medical management.   Suspected obesity hypoventilation syndrome  Acute hypoxic respiratory failure  Opacity c/f  atelectasis vs neoplasm  COPD  Pulmonology Recommendation:  Follow up CT chest and sleep study outpatient  Consider BiPAP in the setting of chronic CO2 retention due to hypoventilation.    Subjective     Past Medical History:   Diagnosis Date    Bronchopneumonia, unspecified organism     Bronchial pneumonia    Cellulitis of right lower limb     Cellulitis of right leg    COPD (chronic obstructive pulmonary disease) (Multi)     Diabetes mellitus (Multi)     Disease of thyroid gland     Fatty (change of) liver, not elsewhere classified     Fatty infiltration of liver    Hypertension     Localized edema 03/04/2015    Leg edema    Other long term (current) drug therapy     Long-term use of high-risk medication    Other malaise     Malaise and fatigue    Otitis media, unspecified, left ear     Left otitis media    Personal history of diseases of the skin and subcutaneous tissue     History of actinic keratosis    Personal history of other diseases of the circulatory system     History of rheumatic fever    Personal history of other diseases of the female genital tract     History of ovarian cyst    Personal history of other diseases of the nervous system and sense organs     History of sciatica    Personal history of other diseases of the nervous system and sense organs     History of carpal tunnel syndrome    Personal history of other diseases of the respiratory system     History of bronchitis    Personal history of other diseases of the respiratory system     History of sinusitis    Personal history of other diseases of the respiratory system     History of chronic obstructive lung disease    Personal history of other endocrine, nutritional and metabolic disease     History of hypothyroidism    Personal history of other endocrine, nutritional and metabolic disease     History of hyperlipidemia    Personal history of other infectious and parasitic diseases     History of herpes labialis    Personal history of other  mental and behavioral disorders     History of depression      Social Hx:  Reports that she quit smoking about 7 years ago. Her smoking use included cigarettes. She has never used smokeless tobacco. She reports that she does not currently use alcohol. She reports that she does not use drugs.     Family Hx:  Family History   Problem Relation Name Age of Onset    Other (alzheimers dementia) Mother      Other (cerebrovascular accident) Father      Other (htn) Father         COPD  Patient has been diagnosed with: COPD and Acute exacerbation of chronic bronchitis  Does patient see pulmonology: No March 18, 2025  has not had PFT's completed in last 2 years    Current Regimen  3 L of supplemental O2  Trelegy once daily  Combivent QID prn  Duonebs prn    Clarifications to above regimen: 3 L oxygen with exertion, takes combivent prn  Adverse Effects: none   Appropriate technique? Yes  Prior to hospitalization the patient was only taking trelegy prn and not daily. Now that she takes this daily she feels like it has helped decrease SOB, wheezing, etc    Historical Treatment  Was not on oxygen prior to recent hospitalization    Symptom Management  Current symptoms: dyspnea, cough, wheezing, and fatigue  Triggers: exertion and tired cold  Alleviating factors: oxygen and nebulizer    Exacerbation Hx  When was your last hospitalization for an exacerbation? 1/6 discharge  Last day of abx was Tuesday  Today was last dose of steroid    Rescue Inhaler Use  How often do you use your rescue inhaler? 2 x a day    Immunization History:  Influenza: Date [2024]  COVID: Date [DUE]  RSV: Date [2024]    Diabetes  She presents for her initial diabetic visit. She has type 2 diabetes mellitus. The initial diagnosis of diabetes was made 6 years ago. Her disease course has been improving. Associated symptoms include fatigue, polydipsia and weakness. Pertinent negatives for diabetes include no blurred vision, no chest pain, no foot paresthesias, no  foot ulcerations, no polyphagia, no polyuria and no visual change. Symptoms are stable. Risk factors for coronary artery disease include hypertension, obesity, tobacco exposure, dyslipidemia and diabetes mellitus. Current diabetic treatments: Mounjaro 7.5 mg once weekly and metformin 500 mg twice daily.     Known diabetic complications: peripheral neuropathy, cardiovascular disease, cerebrovascular disease, peripheral vascular disease, and obesity  Does patient follow with Endocrinology: No  **-- patient denies sores or cuts on feet today     Current diabetic medications include:  Mounjaro 10 mg once weekly x3 doses  Metformin 500 mg twice daily      Clarifications to above regimen: takes Mounjaro on Sundays  Adverse Effects: GI upset with metformin  Glucose Readings: patient does not check sugars she states she just got a new meter and hasn't started using it  Glucometer/CGM Type: Trumetrix  BMI: 40; >/= 35? Yes, Class 3 obesity  Current Weight:  260 lbs  Lifestyle:   Diet  Exercise  Secondary Prevention:  Statin? Yes - atorvastatin 40 mg daily  ACE-I/ARB? Yes lisinopril 5 mg daily  Aspirin? No    Pertinent PMH Review:  PMH of Pancreatitis: No  PMH of Retinopathy: No  PMH of Urinary Tract Infections: Yes  PMH of MTC: No  HF: No  _____________________________________________________________________________      PHARMACIST MEDICATION REVIEW    Drug Interactions  The following drug interactions were noted:    High anticholinergic burden in elderly  In patients over 65 years of age these can cause adverse events, such as confusion, dizziness and falls. These have been shown to increase patient mortality.  Anticholinergic Calculator   A score of 3+ is associated with an increased cognitive impairment and mortality  Patient's Score - 12  Scheduled medications  Quetiapine = 3   Recommend to trial trazodone as an alternative  Oxybutynin = 3  PRN  Cyclobenzaprine = 3  Donnatal = 3    Medication System Management  Patient's  preferred pharmacy:   [facility]  Medication Access:  Combivent has $35 copay- copay coupon is for $35 copay  Patient states [employer] [facility] sent her a letter that they weren't covering this? Test billing shows it is covered  Mounjaro copay card  RXBIN: [id number]  PCN: [id number]  GRP: [id number]  ID: [id number]  Expiration Date: 12/31/2025  [place] has $50 copay  Signed patient up for Copay card  BIN: [id number]  PCN: [id number]  Grp: [id number]  ID: [id number]  Adherence: satisfactory  Prior was only taking trelegy prn, but after hospitalization she has been taking it correctly  Have you had any missed doses? No  Allergies   Reviewed? Yes  Changes? No    Medication Reconciliation:  OTCs? none  _____________________________________________________________________________  Objective     Lab Review  Hepatic dysfunction?  Yes, LFTs wnl but patient has either active/past steatosis liver  Kidney  Use adjusted body weight  IBW: 135lbs  AdjBW: 185 lbs    CrCl: 167; GFR: 65 (77)    CT angio chest for pulmonary embolism Result Date: 12/31/2024  Chronic changes suggesting COPD and mild emphysema with bronchial wall thickening in the bilateral lower lobes and right upper lobe which may indicate acute or chronic bronchitis. Subpleural area of nodularity in the lateral aspect of the right lower lobe measuring up to 2.1 cm which may represent rounded atelectasis however neoplasm is not entirely excluded. Further evaluation is recommended with nonemergent PET/CT. Right upper lobe pleural parenchymal scarring. Prominent but nonpathologically enlarged mediastinal and bilateral hilar lymph nodes, most likely reactive however these can also be better assessed on PET/CT. Pronounced cardiomegaly.     Medication Review  Current Outpatient Medications   Medication Instructions    ammonium lactate (Lac-Hydrin) 12 % lotion As needed    blood sugar diagnostic (Blood Glucose Test) strip Test once to twice daily    blood-glucose meter misc Test once to twice  daily    cyclobenzaprine (FLEXERIL) 10 mg, oral, 3 times daily PRN    fluticasone-umeclidin-vilanter (TRELEGY-ELLIPTA) 100-62.5-25 mcg blister with device 1 puff, inhalation, Daily    furosemide (LASIX) 20 mg, oral, Daily PRN    ipratropium-albuteroL (Combivent Respimat)  mcg/actuation inhaler 1 puff, inhalation, 4 times daily RT    ipratropium-albuteroL (Duo-Neb) 0.5-2.5 mg/3 mL nebulizer solution 3 mL, nebulization, Every 4 hours PRN    lancets misc Test once daily as directed    levothyroxine (SYNTHROID, LEVOXYL) 150 mcg, oral, Daily    lisinopril 5 mg, oral, Daily    metFORMIN (GLUCOPHAGE) 500 mg, oral, 2 times daily    Mounjaro 10 mg, subcutaneous, Weekly    omeprazole (PRILOSEC) 40 mg, oral, Daily PRN    oxybutynin XL (Ditropan-XL) 15 mg 24 hr tablet 1 tablet, Daily (0630)    PHENobarbital-hyoscyamine-atropine-scopoloamine (Donnatal) 16.2-0.1037 -0.0194 mg tablet 1-2 tablets, oral, 3 times daily    QUEtiapine (SEROQUEL) 50 mg, oral, Nightly    rosuvastatin (CRESTOR) 40 mg, oral, Daily    valACYclovir (VALTREX) 500 mg, oral, 3 times daily PRN      Vitals  BP Readings from Last 2 Encounters:   02/03/25 138/82   01/13/25 110/66     BMI Readings from Last 1 Encounters:   02/03/25 40.25 kg/m²      Labs  A1C  Lab Results   Component Value Date    HGBA1C 10.0 (H) 02/03/2025    HGBA1C 8.0 (H) 12/10/2024    HGBA1C 7.9 (H) 08/06/2024     BMP  Lab Results   Component Value Date    CALCIUM 10.8 (H) 02/03/2025     02/03/2025    K 3.8 02/03/2025    CO2 30 02/03/2025     02/03/2025    BUN 16 02/03/2025    CREATININE 0.98 02/03/2025    EGFR 64 02/03/2025     LFTs  Lab Results   Component Value Date    ALT 20 02/03/2025    AST 20 02/03/2025    ALKPHOS 88 02/03/2025    BILITOT 0.5 02/03/2025     FLP  Lab Results   Component Value Date    TRIG 250 (H) 02/03/2025    CHOL 121 02/03/2025    LDLF 48 09/12/2023    LDLCALC 56 02/03/2025    HDL 36 (L) 02/03/2025     Urine Microalbumin  Lab Results   Component Value  Date    MICROALBCREA 13.0 2023     Weight Management  Wt Readings from Last 3 Encounters:   25 117 kg (257 lb)   25 118 kg (260 lb)   24 120 kg (265 lb)      _____________________________________________________________________________    Assessment/Plan   Problem List Items Addressed This Visit       Acute bronchitis with chronic obstructive pulmonary disease (COPD) (Multi)    Nicotine dependence    Obesity with body mass index 30 or greater    Persistent cough    DM type 2, not at goal - Primary    HTN (hypertension)    Peripheral artery insufficiency (CMS-HCC)    Transient ischemic attack    Acute hypoxic respiratory failure (Multi)     Patient Goals  Fasting B - 130 mg/dL  Postprandial BG: less than 180 mg/dL  A1c: less than 7%;  Is pt at goal? No, 10  Most likely due to courses of steroids and hospitalization  Patient's SMBGs are unknown    COPD  Patient with COPD that is improved, loss of control due to intercurrent illness, needs further observation, and needs improvement.  Medication Changes:  CONTINUE  All medications  Monitoring and Education:  Adherence   Rescue inhaler/nebulizer use  Supplemental O2 use  Symptoms of COPD  Exacerbations    DIABETES MELLITUS TYPE 2   Diabetes mellitus Type II, under unsatisfactory control. Patient's hhba1c has worsened to 10% on 2/3/25 from 8.0% on 12/10/24 that had worsened prior at 7.9% in 2024. Patient denies ADRs from mounjaro but does endorse diarrhea from metformin. Patient agreeable to increased dose.  Reinforced healthy diet, lifestyle, and exercise.  Prescription medication ordered.  Lab work ordered.  Rationale: Further titrate mounjaro for further glycemic benefit, T2DM disease control, weight loss, and CVD/renal protection  Candidate for GLP? Yes; on mounjaro 7.5 mg   Candidate for SGLT2? potentially but does have a history of UTIs  Candidate for DPP4? No; not on glp-1  Candidate for Metformin? Yes; patient on 500 mg  BID  Candidate for Insulin? No; A1c >/= 11? No  Medication Changes:  INCREASE Mounjaro to 12.5 mg once weekly  Future Considerations:  Plan to discontinue metformin after repeat hba1c   Due to steroids course will not discontinue metformin  Monitoring  RFP, LFTs, weight, bmi, hba1c, lipids  Discussed general issues about diabetes pathophysiology and management.  Addressed ADA diet.  Suggested low cholesterol diet.  Encouraged aerobic exercise.  Discussed foot care.  Education:   Diet   Exercise    Clinical Pharmacist follow-up: 4 weeks, Telehealth visit  Upcoming Appointments: PCP 2/3/25    Continue all meds under the continuation of care with the referring provider and clinical pharmacy team.    Thank you,  Jeanette Parekh, PharmD   Clinical Pharmacist Specialist, Primary Care   Phone: [phone]   Fax: [phone]   Email: [email or web address]    Verbal consent to manage patient's drug therapy was obtained from the patient. They were informed they may decline to participate or withdraw from participation in pharmacy services at any time

## 2025-02-13 ENCOUNTER — TELEMEDICINE (OUTPATIENT)
Dept: PHARMACY | Facility: HOSPITAL | Age: 65
End: 2025-02-13
Payer: COMMERCIAL

## 2025-02-13 DIAGNOSIS — R05.3 PERSISTENT COUGH: ICD-10-CM

## 2025-02-13 DIAGNOSIS — E66.9 OBESITY WITH BODY MASS INDEX 30 OR GREATER: ICD-10-CM

## 2025-02-13 DIAGNOSIS — R11.2 NAUSEA AND VOMITING, UNSPECIFIED VOMITING TYPE: ICD-10-CM

## 2025-02-13 DIAGNOSIS — J96.01 ACUTE HYPOXIC RESPIRATORY FAILURE (MULTI): ICD-10-CM

## 2025-02-13 DIAGNOSIS — J44.0 ACUTE BRONCHITIS WITH CHRONIC OBSTRUCTIVE PULMONARY DISEASE (COPD) (MULTI): ICD-10-CM

## 2025-02-13 DIAGNOSIS — J44.1 COPD WITH ACUTE EXACERBATION (MULTI): ICD-10-CM

## 2025-02-13 DIAGNOSIS — K58.9 IRRITABLE BOWEL SYNDROME, UNSPECIFIED TYPE: ICD-10-CM

## 2025-02-13 DIAGNOSIS — E11.9 DM TYPE 2, NOT AT GOAL: Primary | ICD-10-CM

## 2025-02-13 DIAGNOSIS — F17.200 NICOTINE DEPENDENCE, UNCOMPLICATED, UNSPECIFIED NICOTINE PRODUCT TYPE: ICD-10-CM

## 2025-02-13 DIAGNOSIS — I10 PRIMARY HYPERTENSION: ICD-10-CM

## 2025-02-13 DIAGNOSIS — I73.9 PERIPHERAL ARTERY INSUFFICIENCY (CMS-HCC): ICD-10-CM

## 2025-02-13 DIAGNOSIS — G45.9 TRANSIENT ISCHEMIC ATTACK: ICD-10-CM

## 2025-02-13 DIAGNOSIS — J20.9 ACUTE BRONCHITIS WITH CHRONIC OBSTRUCTIVE PULMONARY DISEASE (COPD) (MULTI): ICD-10-CM

## 2025-02-13 RX ORDER — METFORMIN HYDROCHLORIDE 500 MG/1
500 TABLET ORAL 2 TIMES DAILY
Qty: 60 TABLET | Refills: 11 | Status: SHIPPED | OUTPATIENT
Start: 2025-02-13 | End: 2026-02-13

## 2025-02-13 ASSESSMENT — ENCOUNTER SYMPTOMS
FATIGUE: 1
VISUAL CHANGE: 0
BLURRED VISION: 0
WEAKNESS: 1
POLYPHAGIA: 0
POLYDIPSIA: 1

## 2025-02-13 NOTE — PROGRESS NOTES
Pharmacy Post-Discharge Clinical Pharmacist Appointment    Patient ID: [id number]  Danelle Jacobs is a 64 y.o. female who presents for Follow Up appointment. She was referred to Clinical Pharmacy Team to complete a post-discharge medication optimization and monitoring visit.  Reason for Referral: cost assistance with trelegy and Mounjaro   Referring Provider and PCP: Junior Abreu DO   Last visit with PCP: 2/3/25  Patient states that she has a history of COPD and is not on home oxygen, usually uses her nebulizer however her power has been out at home since Thursday. Patient went to urgent care for evaluation and was found to be hypoxic with an SpO2 of 79%. Patient was sent to ED for further evaluation. Patient was placed on high flow oxygen in ED, imaging completed showing chronic changes, negative acute however pulmonary and hilar lymph nodes were found and a PET scan, nonemergent is recommended. Patient was treated with IV Solu-Medrol and DuoNeb treatments, remains on high flow oxygen, admitted for further medical management.    Next visit with PCP: 2/3/25    Recent Hospitalization 12/30/2024 - 1/6/2025 (7 days) at [facility]  Acute hypoxic respiratory failure (Multi).   Cough and congestion. Patient states that she has a history of COPD and is not on home oxygen, usually uses her nebulizer however her power has been out at home since Thursday. Patient went to urgent care for evaluation and was found to be hypoxic with an SpO2 of 79%. Patient was sent to ED for further evaluation. Patient was placed on high flow oxygen in ED, imaging completed showing chronic changes, negative acute however pulmonary and hilar lymph nodes were found and a PET scan, nonemergent is recommended. Patient was treated with IV Solu-Medrol and DuoNeb treatments, remains on high flow oxygen, admitted for further medical management.   Suspected obesity hypoventilation syndrome  Acute hypoxic respiratory failure  Opacity c/f  atelectasis vs neoplasm  COPD  Pulmonology Recommendation:  Follow up CT chest and sleep study outpatient  Consider BiPAP in the setting of chronic CO2 retention due to hypoventilation.    Subjective     Past Medical History:   Diagnosis Date    Bronchopneumonia, unspecified organism     Bronchial pneumonia    Cellulitis of right lower limb     Cellulitis of right leg    COPD (chronic obstructive pulmonary disease) (Multi)     Diabetes mellitus (Multi)     Disease of thyroid gland     Fatty (change of) liver, not elsewhere classified     Fatty infiltration of liver    Hypertension     Localized edema 03/04/2015    Leg edema    Other long term (current) drug therapy     Long-term use of high-risk medication    Other malaise     Malaise and fatigue    Otitis media, unspecified, left ear     Left otitis media    Personal history of diseases of the skin and subcutaneous tissue     History of actinic keratosis    Personal history of other diseases of the circulatory system     History of rheumatic fever    Personal history of other diseases of the female genital tract     History of ovarian cyst    Personal history of other diseases of the nervous system and sense organs     History of sciatica    Personal history of other diseases of the nervous system and sense organs     History of carpal tunnel syndrome    Personal history of other diseases of the respiratory system     History of bronchitis    Personal history of other diseases of the respiratory system     History of sinusitis    Personal history of other diseases of the respiratory system     History of chronic obstructive lung disease    Personal history of other endocrine, nutritional and metabolic disease     History of hypothyroidism    Personal history of other endocrine, nutritional and metabolic disease     History of hyperlipidemia    Personal history of other infectious and parasitic diseases     History of herpes labialis    Personal history of other  mental and behavioral disorders     History of depression      Social Hx:  Reports that she quit smoking about 7 years ago. Her smoking use included cigarettes. She has never used smokeless tobacco. She reports that she does not currently use alcohol. She reports that she does not use drugs.     Family Hx:  Family History   Problem Relation Name Age of Onset    Other (alzheimers dementia) Mother      Other (cerebrovascular accident) Father      Other (htn) Father         COPD  Patient has been diagnosed with: COPD and Acute exacerbation of chronic bronchitis  Does patient see pulmonology: No March 18, 2025  has not had PFT's completed in last 2 years    Current Regimen  3 L of supplemental O2  Trelegy once daily  Combivent QID prn  Duonebs prn    Clarifications to above regimen: 3 L oxygen with exertion, takes combivent prn  Adverse Effects: none   Appropriate technique? Yes  Prior to hospitalization the patient was only taking trelegy prn and not daily. Now that she takes this daily she feels like it has helped decrease SOB, wheezing, etc    Historical Treatment  Was not on oxygen prior to recent hospitalization    Symptom Management  Current symptoms: dyspnea, cough, wheezing, and fatigue  Triggers: exertion and tired cold  Alleviating factors: oxygen and nebulizer    Exacerbation Hx  When was your last hospitalization for an exacerbation? 1/6 discharge  Last day of abx was Tuesday  Today was last dose of steroid    Rescue Inhaler Use  How often do you use your rescue inhaler? 2 x a day    Immunization History:  Influenza: Date [2024]  COVID: Date [DUE]  RSV: Date [2024]    Diabetes  She presents for her initial diabetic visit. She has type 2 diabetes mellitus. The initial diagnosis of diabetes was made 6 years ago. Her disease course has been improving. Associated symptoms include fatigue, polydipsia and weakness. Pertinent negatives for diabetes include no blurred vision, no chest pain, no foot paresthesias, no  foot ulcerations, no polyphagia, no polyuria and no visual change. Symptoms are stable. Risk factors for coronary artery disease include hypertension, obesity, tobacco exposure, dyslipidemia and diabetes mellitus. Current diabetic treatments: Mounjaro 7.5 mg once weekly and metformin 500 mg twice daily.     Known diabetic complications: peripheral neuropathy, cardiovascular disease, cerebrovascular disease, peripheral vascular disease, and obesity  Does patient follow with Endocrinology: No  **-- patient denies sores or cuts on feet today     Current diabetic medications include:  Mounjaro 10 mg once weekly x3 doses  Metformin 500 mg twice daily      Clarifications to above regimen: takes Mounjaro on Sundays  Adverse Effects: GI upset with metformin  Glucose Readings: patient does not check sugars she states she just got a new meter and hasn't started using it - is at   Glucometer/CGM Type: Trumetrix  BMI: 40; >/= 35? Yes, Class 3 obesity  Current Weight:  260 lbs  Lifestyle:   Diet  Exercise  Secondary Prevention:  Statin? Yes - atorvastatin 40 mg daily  ACE-I/ARB? Yes lisinopril 5 mg daily  Aspirin? No    Pertinent PMH Review:  PMH of Pancreatitis: No  PMH of Retinopathy: No  PMH of Urinary Tract Infections: Yes  PMH of MTC: No  HF: No  _____________________________________________________________________________      PHARMACIST MEDICATION REVIEW    Drug Interactions  The following drug interactions were noted:    High anticholinergic burden in elderly  In patients over 65 years of age these can cause adverse events, such as confusion, dizziness and falls. These have been shown to increase patient mortality.  Anticholinergic Calculator   A score of 3+ is associated with an increased cognitive impairment and mortality  Patient's Score - 12  Scheduled medications  Quetiapine = 3   Recommend to trial trazodone as an alternative  Oxybutynin = 3  PRN  Cyclobenzaprine = 3  Donnatal = 3    Medication System  Management  Patient's preferred pharmacy:   [facility]  Medication Access:  Estimated Annual Income of $50,000 a year  Patient qualifies for  PAP   passed - 2024  Combivent has $35 copay- copay coupon is for $35 copay  Patient states [employer] sent her a letter that they weren't covering this? Test billing shows it is covered  Mounjaro copay card  RXBIN: [id number]  PCN: [id number]  GRP: [id number]  ID: [id number]  Expiration Date: 12/31/2025  Trelegy has $50 copay  Signed patient up for Copay card  BIN: [id number]  PCN: [id number]  Grp: [id number]  ID: [id number]  Adherence: satisfactory  Prior was only taking trelegy prn, but after hospitalization she has been taking it correctly  Have you had any missed doses? No  Allergies   Reviewed? Yes  Changes? No    Medication Reconciliation:  OTCs? None  Dispense History Review    _____________________________________________________________________________  Objective     Lab Review  Hepatic dysfunction?  Yes, LFTs wnl but patient has either active/past steatosis liver  Kidney  Use adjusted body weight  IBW: 135lbs  AdjBW: 185 lbs    CrCl: 167; GFR: 65 (77)    CT angio chest for pulmonary embolism Result Date: 12/31/2024  Chronic changes suggesting COPD and mild emphysema with bronchial wall thickening in the bilateral lower lobes and right upper lobe which may indicate acute or chronic bronchitis. Subpleural area of nodularity in the lateral aspect of the right lower lobe measuring up to 2.1 cm which may represent rounded atelectasis however neoplasm is not entirely excluded. Further evaluation is recommended with nonemergent PET/CT. Right upper lobe pleural parenchymal scarring. Prominent but nonpathologically enlarged mediastinal and bilateral hilar lymph nodes, most likely reactive however these can also be better assessed on PET/CT. Pronounced cardiomegaly.     Medication Review  Current Outpatient Medications   Medication Instructions    ammonium lactate (Lac-Hydrin) 12  % lotion As needed    blood sugar diagnostic (Blood Glucose Test) strip Test once to twice daily    blood-glucose meter misc Test once to twice daily    cyclobenzaprine (FLEXERIL) 10 mg, oral, 3 times daily PRN    fluticasone-umeclidin-vilanter (TRELEGY-ELLIPTA) 100-62.5-25 mcg blister with device 1 puff, inhalation, Daily    furosemide (LASIX) 20 mg, oral, Daily PRN    ipratropium-albuteroL (Combivent Respimat)  mcg/actuation inhaler 1 puff, inhalation, 4 times daily RT    ipratropium-albuteroL (Duo-Neb) 0.5-2.5 mg/3 mL nebulizer solution 3 mL, nebulization, Every 4 hours PRN    lancets misc Test once daily as directed    levothyroxine (SYNTHROID, LEVOXYL) 150 mcg, oral, Daily    lisinopril 5 mg, oral, Daily    metFORMIN (GLUCOPHAGE) 500 mg, oral, 2 times daily    Mounjaro 12.5 mg, subcutaneous, Weekly    omeprazole (PRILOSEC) 40 mg, oral, Daily PRN    oxybutynin XL (Ditropan-XL) 15 mg 24 hr tablet 1 tablet, Daily (0630)    PHENobarbital-hyoscyamine-atropine-scopoloamine (Donnatal) 16.2-0.1037 -0.0194 mg tablet 1-2 tablets, oral, 3 times daily    QUEtiapine (SEROQUEL) 50 mg, oral, Nightly    rosuvastatin (CRESTOR) 40 mg, oral, Daily    valACYclovir (VALTREX) 500 mg, oral, 3 times daily PRN      Vitals  BP Readings from Last 2 Encounters:   02/03/25 138/82   01/13/25 110/66     BMI Readings from Last 1 Encounters:   02/03/25 40.25 kg/m²      Labs  A1C  Lab Results   Component Value Date    HGBA1C 10.0 (H) 02/03/2025    HGBA1C 8.0 (H) 12/10/2024    HGBA1C 7.9 (H) 08/06/2024     BMP  Lab Results   Component Value Date    CALCIUM 10.8 (H) 02/03/2025     02/03/2025    K 3.8 02/03/2025    CO2 30 02/03/2025     02/03/2025    BUN 16 02/03/2025    CREATININE 0.98 02/03/2025    EGFR 64 02/03/2025     LFTs  Lab Results   Component Value Date    ALT 20 02/03/2025    AST 20 02/03/2025    ALKPHOS 88 02/03/2025    BILITOT 0.5 02/03/2025     FLP  Lab Results   Component Value Date    TRIG 250 (H) 02/03/2025     CHOL 121 2025    LDLF 48 2023    LDLCALC 56 2025    HDL 36 (L) 2025     Urine Microalbumin  Lab Results   Component Value Date    MICROALBCREA 13.0 2023     Weight Management  Wt Readings from Last 3 Encounters:   25 117 kg (257 lb)   25 118 kg (260 lb)   24 120 kg (265 lb)    _____________________________________________________________________________    Assessment/Plan   Problem List Items Addressed This Visit       Acute bronchitis with chronic obstructive pulmonary disease (COPD) (Multi)    Nicotine dependence    Obesity with body mass index 30 or greater    Persistent cough    DM type 2, not at goal - Primary    HTN (hypertension)    Irritable bowel syndrome    Nausea and vomiting    Peripheral artery insufficiency (CMS-HCC)    Transient ischemic attack    Acute hypoxic respiratory failure (Multi)      Patient Assistance Screening (VAF)  Patient verbally reports monthly or yearly income which is less than 400% federal poverty level  Patient aware this process may take up to 2 weeks once income documents have been sent to the team.  If approved, medication must be filled through [facility] pharmacy and may be picked up or mailed to patient.   If approved, medication will be billed through insurance, and patient assistance team will pay the copay. This will result in a $0 copay for the patient.  Counseled patient on mechanism of action, side effects, contraindications, and what to do if the patient misses a dose. All patients questions were answered.      Patient Goals  Fasting B - 130 mg/dL  Postprandial BG: less than 180 mg/dL  A1c: less than 7%;  Is pt at goal? No, 10  Most likely due to courses of steroids and hospitalization  Patient's SMBGs are unknown    COPD  Patient with COPD that is improved, needs further observation, needs improvement, and patient poorly compliant.Send new script of [facility]legy to [facility] with a copay coupon in order to improve  adherence by decreasing cost of inhaler. Continue GDMT and continue to monitor prn inhaler use for efficacy and optimization  Medication Changes:  CONTINUE  All medications  Monitoring and Education:  Adherence   Rescue inhaler/nebulizer use  Supplemental O2 use  Symptoms of COPD  Exacerbations    DIABETES MELLITUS TYPE 2   Diabetes mellitus Type II, under unsatisfactory control. Patient's hhba1c has worsened to 10% on 2/3/25 from 8.0% on 12/10/24 that had worsened prior at 7.9% in August 2024. Patient denies ADRs from mounjaro but does endorse diarrhea from metformin. Patient agreeable to increased dose.  Reinforced healthy diet, lifestyle, and exercise.  Prescription medication ordered.  Lab work ordered.  Rationale: Further titrate mounjaro for further glycemic benefit, T2DM disease control, weight loss, and CVD/renal protection  Candidate for GLP? Yes; on mounjaro 7.5 mg   Candidate for SGLT2? potentially but does have a history of UTIs  Candidate for DPP4? No; not on glp-1  Candidate for Metformin? Yes; patient on 500 mg BID  Candidate for Insulin? No; A1c >/= 11? No  Medication Changes:  INCREASE Mounjaro to 12.5 mg once weekly  Emailing pt to have them respond with 2024 tax return when completed  Called pharmacy and they figured out issue with her mounjaro, copay is $25 monthly   Future Considerations:  Plan to discontinue metformin after repeat hba1c   Due to steroids course will not discontinue metformin  Monitoring  RFP, LFTs, weight, bmi, hba1c, lipids  Discussed general issues about diabetes pathophysiology and management.  Addressed ADA diet.  Suggested low cholesterol diet.  Encouraged aerobic exercise.  Discussed foot care.    Education:   Diet   Exercise    Clinical Pharmacist follow-up: 4 weeks, Telehealth visit  Upcoming Appointments: PCP 2/3/25  - call pharmacy about copay card: applied and will be reimbursed  - email her about  pap program  -     Continue all meds under the continuation of care  with the referring provider and clinical pharmacy team.    Thank you,  Jeanette Parekh, PharmD   Clinical Pharmacist Specialist, Primary Care   Phone: [phone]   Fax: [phone]   Email: [email or web address]    Verbal consent to manage patient's drug therapy was obtained from the patient. They were informed they may decline to participate or withdraw from participation in pharmacy services at any time

## 2025-03-03 ENCOUNTER — APPOINTMENT (OUTPATIENT)
Dept: PRIMARY CARE | Facility: CLINIC | Age: 65
End: 2025-03-03
Payer: COMMERCIAL

## 2025-03-03 VITALS
WEIGHT: 246 LBS | DIASTOLIC BLOOD PRESSURE: 80 MMHG | HEART RATE: 102 BPM | SYSTOLIC BLOOD PRESSURE: 144 MMHG | OXYGEN SATURATION: 91 % | BODY MASS INDEX: 38.53 KG/M2

## 2025-03-03 DIAGNOSIS — E11.9 DIABETES MELLITUS, STABLE (MULTI): Primary | ICD-10-CM

## 2025-03-03 DIAGNOSIS — J20.9 ACUTE BRONCHITIS WITH CHRONIC OBSTRUCTIVE PULMONARY DISEASE (COPD) (MULTI): ICD-10-CM

## 2025-03-03 DIAGNOSIS — E11.9 DM TYPE 2, NOT AT GOAL: ICD-10-CM

## 2025-03-03 DIAGNOSIS — J44.0 ACUTE BRONCHITIS WITH CHRONIC OBSTRUCTIVE PULMONARY DISEASE (COPD) (MULTI): ICD-10-CM

## 2025-03-03 PROCEDURE — 3079F DIAST BP 80-89 MM HG: CPT | Performed by: STUDENT IN AN ORGANIZED HEALTH CARE EDUCATION/TRAINING PROGRAM

## 2025-03-03 PROCEDURE — 1123F ACP DISCUSS/DSCN MKR DOCD: CPT | Performed by: STUDENT IN AN ORGANIZED HEALTH CARE EDUCATION/TRAINING PROGRAM

## 2025-03-03 PROCEDURE — 1036F TOBACCO NON-USER: CPT | Performed by: STUDENT IN AN ORGANIZED HEALTH CARE EDUCATION/TRAINING PROGRAM

## 2025-03-03 PROCEDURE — 4010F ACE/ARB THERAPY RXD/TAKEN: CPT | Performed by: STUDENT IN AN ORGANIZED HEALTH CARE EDUCATION/TRAINING PROGRAM

## 2025-03-03 PROCEDURE — 99214 OFFICE O/P EST MOD 30 MIN: CPT | Performed by: STUDENT IN AN ORGANIZED HEALTH CARE EDUCATION/TRAINING PROGRAM

## 2025-03-03 PROCEDURE — 1159F MED LIST DOCD IN RCRD: CPT | Performed by: STUDENT IN AN ORGANIZED HEALTH CARE EDUCATION/TRAINING PROGRAM

## 2025-03-03 PROCEDURE — 3077F SYST BP >= 140 MM HG: CPT | Performed by: STUDENT IN AN ORGANIZED HEALTH CARE EDUCATION/TRAINING PROGRAM

## 2025-03-03 ASSESSMENT — ENCOUNTER SYMPTOMS: DEPRESSION: 0

## 2025-03-03 ASSESSMENT — PATIENT HEALTH QUESTIONNAIRE - PHQ9
SUM OF ALL RESPONSES TO PHQ9 QUESTIONS 1 AND 2: 0
1. LITTLE INTEREST OR PLEASURE IN DOING THINGS: NOT AT ALL
2. FEELING DOWN, DEPRESSED OR HOPELESS: NOT AT ALL
SUM OF ALL RESPONSES TO PHQ9 QUESTIONS 1 AND 2: 0
1. LITTLE INTEREST OR PLEASURE IN DOING THINGS: NOT AT ALL
2. FEELING DOWN, DEPRESSED OR HOPELESS: NOT AT ALL

## 2025-03-03 NOTE — LETTER
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   If you have any questions or concerns, please contact us at [phone].    Sincerely,       Junior Abreu, DO

## 2025-03-03 NOTE — PROGRESS NOTES
Subjective   Patient ID: Danelle Jacobs is a 64 y.o. female who presents for Follow-up.    HPI     COPD: Patient has mild emphysema and COPD and CT and Trelegy albuterol) was pulmonology in about 2 weeks.  On chronic oxygen since hospitalization a few months ago.  Still drops to 87% of room air upon ambulation.  Trying a sleep study done    Diabetes: A1c 10 drinks Diet Coke and coffee does not drink well water.  Currently on Mounjaro lost 20 pounds in 2 months doing well with this.  Watching what she is eating does not check her blood sugar although I do expect to be little better given her weight loss.    Review of Systems   All other systems reviewed and are negative.      Objective   /80 (BP Location: Left arm, Patient Position: Sitting, BP Cuff Size: Large adult)   Pulse 102   Wt 112 kg (246 lb)   SpO2 91%   BMI 38.53 kg/m²     Physical Exam  Constitutional:       Appearance: Normal appearance.   HENT:      Head: Normocephalic and atraumatic.      Right Ear: Tympanic membrane and ear canal normal.      Left Ear: Tympanic membrane and ear canal normal.      Mouth/Throat:      Mouth: Mucous membranes are moist.      Pharynx: Oropharynx is clear.   Eyes:      Extraocular Movements: Extraocular movements intact.      Conjunctiva/sclera: Conjunctivae normal.      Pupils: Pupils are equal, round, and reactive to light.   Cardiovascular:      Rate and Rhythm: Normal rate and regular rhythm.      Pulses: Normal pulses.      Heart sounds: Normal heart sounds.   Pulmonary:      Effort: Pulmonary effort is normal.      Breath sounds: Normal breath sounds.   Abdominal:      General: Abdomen is flat. Bowel sounds are normal.      Palpations: Abdomen is soft.   Musculoskeletal:         General: Normal range of motion.      Cervical back: Normal range of motion and neck supple.   Skin:     General: Skin is warm and dry.      Capillary Refill: Capillary refill takes 2 to 3 seconds.   Neurological:      General: No  focal deficit present.      Mental Status: She is alert and oriented to person, place, and time. Mental status is at baseline.   Psychiatric:         Mood and Affect: Mood normal.         Behavior: Behavior normal.         Thought Content: Thought content normal.         Judgment: Judgment normal.       Assessment/Plan     1. Diabetes mellitus, stable (Multi) (Primary)  Stable continue vies against diet soda and coffee more hydration.    2. DM type 2, not at goal  10 was (on Medrol lost 20 pounds doing well will check A1c in 2 months.    3. Acute bronchitis with chronic obstructive pulmonary disease (COPD) (Multi)  Stable on chronic oxygen follows with pulmonology  Patient able to back to work March 24  Work from home given her breathing issues.

## 2025-03-06 ENCOUNTER — APPOINTMENT (OUTPATIENT)
Dept: PHARMACY | Facility: HOSPITAL | Age: 65
End: 2025-03-06
Payer: COMMERCIAL

## 2025-03-06 DIAGNOSIS — I10 PRIMARY HYPERTENSION: ICD-10-CM

## 2025-03-06 DIAGNOSIS — R73.9 HYPERGLYCEMIA: Primary | ICD-10-CM

## 2025-03-06 DIAGNOSIS — E11.9 DM TYPE 2, NOT AT GOAL: ICD-10-CM

## 2025-03-06 DIAGNOSIS — E78.00 HYPERCHOLESTEROLEMIA: ICD-10-CM

## 2025-03-06 DIAGNOSIS — G45.9 TRANSIENT ISCHEMIC ATTACK: ICD-10-CM

## 2025-03-06 DIAGNOSIS — J44.0 ACUTE BRONCHITIS WITH CHRONIC OBSTRUCTIVE PULMONARY DISEASE (COPD) (MULTI): ICD-10-CM

## 2025-03-06 DIAGNOSIS — J20.9 ACUTE BRONCHITIS WITH CHRONIC OBSTRUCTIVE PULMONARY DISEASE (COPD) (MULTI): ICD-10-CM

## 2025-03-06 DIAGNOSIS — I73.9 PERIPHERAL ARTERY INSUFFICIENCY (CMS-HCC): ICD-10-CM

## 2025-03-06 DIAGNOSIS — R05.3 PERSISTENT COUGH: ICD-10-CM

## 2025-03-06 DIAGNOSIS — E66.9 OBESITY WITH BODY MASS INDEX 30 OR GREATER: ICD-10-CM

## 2025-03-06 DIAGNOSIS — J96.01 ACUTE HYPOXIC RESPIRATORY FAILURE (MULTI): ICD-10-CM

## 2025-03-06 DIAGNOSIS — F17.200 NICOTINE DEPENDENCE, UNCOMPLICATED, UNSPECIFIED NICOTINE PRODUCT TYPE: ICD-10-CM

## 2025-03-06 RX ORDER — TIRZEPATIDE 15 MG/.5ML
15 INJECTION, SOLUTION SUBCUTANEOUS
Qty: 6 ML | Refills: 3 | Status: SHIPPED | OUTPATIENT
Start: 2025-03-06 | End: 2026-03-06

## 2025-03-06 ASSESSMENT — ENCOUNTER SYMPTOMS
POLYDIPSIA: 1
WEAKNESS: 1
FATIGUE: 1
VISUAL CHANGE: 0
BLURRED VISION: 0
POLYPHAGIA: 0

## 2025-03-06 NOTE — PROGRESS NOTES
Pharmacy Post-Discharge Clinical Pharmacist Appointment    Patient ID: [id number]  Danelle Jacobs is a 64 y.o. female who presents for Follow Up appointment. She was referred to Clinical Pharmacy Team to complete a post-discharge medication optimization and monitoring visit.  Reason for Referral: cost assistance with trelegy and Mounjaro   Referring Provider and PCP: Junior Abreu DO   Last visit with PCP: 2/3/25  Patient states that she has a history of COPD and is not on home oxygen, usually uses her nebulizer however her power has been out at home since Thursday. Patient went to urgent care for evaluation and was found to be hypoxic with an SpO2 of 79%. Patient was sent to ED for further evaluation. Patient was placed on high flow oxygen in ED, imaging completed showing chronic changes, negative acute however pulmonary and hilar lymph nodes were found and a PET scan, nonemergent is recommended. Patient was treated with IV Solu-Medrol and DuoNeb treatments, remains on high flow oxygen, admitted for further medical management.    Next visit with PCP: 2/3/25    Recent Hospitalization 12/30/2024 - 1/6/2025 (7 days) at [facility]  Acute hypoxic respiratory failure (Multi).   Cough and congestion. Patient states that she has a history of COPD and is not on home oxygen, usually uses her nebulizer however her power has been out at home since Thursday. Patient went to urgent care for evaluation and was found to be hypoxic with an SpO2 of 79%. Patient was sent to ED for further evaluation. Patient was placed on high flow oxygen in ED, imaging completed showing chronic changes, negative acute however pulmonary and hilar lymph nodes were found and a PET scan, nonemergent is recommended. Patient was treated with IV Solu-Medrol and DuoNeb treatments, remains on high flow oxygen, admitted for further medical management.   Suspected obesity hypoventilation syndrome  Acute hypoxic respiratory failure  Opacity c/f  atelectasis vs neoplasm  COPD  Pulmonology Recommendation:  Follow up CT chest and sleep study outpatient  Consider BiPAP in the setting of chronic CO2 retention due to hypoventilation.    Subjective     Past Medical History:   Diagnosis Date    Bronchopneumonia, unspecified organism     Bronchial pneumonia    Cellulitis of right lower limb     Cellulitis of right leg    COPD (chronic obstructive pulmonary disease) (Multi)     Diabetes mellitus (Multi)     Disease of thyroid gland     Fatty (change of) liver, not elsewhere classified     Fatty infiltration of liver    Hypertension     Localized edema 03/04/2015    Leg edema    Other long term (current) drug therapy     Long-term use of high-risk medication    Other malaise     Malaise and fatigue    Otitis media, unspecified, left ear     Left otitis media    Personal history of diseases of the skin and subcutaneous tissue     History of actinic keratosis    Personal history of other diseases of the circulatory system     History of rheumatic fever    Personal history of other diseases of the female genital tract     History of ovarian cyst    Personal history of other diseases of the nervous system and sense organs     History of sciatica    Personal history of other diseases of the nervous system and sense organs     History of carpal tunnel syndrome    Personal history of other diseases of the respiratory system     History of bronchitis    Personal history of other diseases of the respiratory system     History of sinusitis    Personal history of other diseases of the respiratory system     History of chronic obstructive lung disease    Personal history of other endocrine, nutritional and metabolic disease     History of hypothyroidism    Personal history of other endocrine, nutritional and metabolic disease     History of hyperlipidemia    Personal history of other infectious and parasitic diseases     History of herpes labialis    Personal history of other  mental and behavioral disorders     History of depression      Social Hx:  Reports that she quit smoking about 7 years ago. Her smoking use included cigarettes. She has never used smokeless tobacco. She reports that she does not currently use alcohol. She reports that she does not use drugs.   Going back to work this month    Family Hx:  Family History   Problem Relation Name Age of Onset    Other (alzheimers dementia) Mother      Other (cerebrovascular accident) Father      Other (htn) Father         COPD  Patient has been diagnosed with: COPD and Acute exacerbation of chronic bronchitis  Does patient see pulmonology: No March 18, 2025  has not had PFT's completed in last 2 years    Current Regimen  2 L of supplemental O2  Does not have to use oxygen while sitting  Only moving   Trelegy once daily  Taking regularly   Combivent QID prn  Used x1 last week while shopping  [place] prn    Clarifications to above regimen: 2 L oxygen (last time was on 3 L) with exertion, takes combivent prn  Adverse Effects: none   Appropriate technique? Yes  Prior to hospitalization the patient was only taking trelegy prn and not daily. Now that she takes this daily she feels like it has helped decrease SOB, wheezing, etc    Historical Treatment  Was not on oxygen prior to recent hospitalization    Symptom Management  Current symptoms: fatigue  Triggers: exertion and tired cold  Warming   Alleviating factors: oxygen and nebulizer    Exacerbation Hx  When was your last hospitalization for an exacerbation? 1/6 discharge  Last day of abx was Tuesday  Today was last dose of steroid    Rescue Inhaler Use  How often do you use your rescue inhaler? 2 x a day    Immunization History:  Influenza: Date [2024]  COVID: Date [DUE]  RSV: Date [2024]    Diabetes  She presents for her initial diabetic visit. She has type 2 diabetes mellitus. The initial diagnosis of diabetes was made 6 years ago. Her disease course has been improving. Associated  symptoms include fatigue, polydipsia and weakness. Pertinent negatives for diabetes include no blurred vision, no chest pain, no foot paresthesias, no foot ulcerations, no polyphagia, no polyuria and no visual change. Symptoms are stable. Risk factors for coronary artery disease include hypertension, obesity, tobacco exposure, dyslipidemia and diabetes mellitus. Current diabetic treatments: Mounjaro 7.5 mg once weekly and metformin 500 mg twice daily.     Known diabetic complications: peripheral neuropathy, cardiovascular disease, cerebrovascular disease, peripheral vascular disease, and obesity  Does patient follow with Endocrinology: No  **-- patient denies sores or cuts on feet today     Current diabetic medications include:  Mounjaro 12.5 mg  once weekly x3 doses  Endorses constipation   4 BM a week  Does not drink sufficient water  Denies diarrhea   Metformin 500 mg twice daily      Clarifications to above regimen: takes Mounjaro on Sundays  Adverse Effects: GI upset with metformin  Glucose Readings: patient does not check sugars she states she just got a new meter and hasn't started using it   Glucometer/CGM Type: Trumetrix  BMI: 40; >/= 35? Yes, Class 3 obesity  Current Weight:  3/3/25 - 251 lbs   Last appt: 260 lbs  Lifestyle:   Diet  Exercise    Secondary Prevention:  Statin? Yes - atorvastatin 40 mg daily  ACE-I/ARB? Yes lisinopril 5 mg daily  Aspirin? No    Pertinent PMH Review:  PMH of Pancreatitis: No  PMH of Retinopathy: No  PMH of Urinary Tract Infections: Yes  PMH of MTC: No  HF: No  _____________________________________________________________________________      PHARMACIST MEDICATION REVIEW    Drug Interactions  The following drug interactions were noted:    High anticholinergic burden in elderly  In patients over 65 years of age these can cause adverse events, such as confusion, dizziness and falls. These have been shown to increase patient mortality.  Anticholinergic Calculator   A score of 3+  is associated with an increased cognitive impairment and mortality  Patient's Score - 12  Scheduled medications  Quetiapine = 3   Recommend to trial trazodone as an alternative  Oxybutynin = 3  PRN  Cyclobenzaprine = 3  Donnatal = 3    Medication System Management  Patient's preferred pharmacy:   [facility]  Medication Access:  Estimated Annual Income of $50,000 a year  Patient qualifies for  PAP   passed - 2024  Combivent has $35 copay- copay coupon is for $35 copay  Patient states [employer] sent her a letter that they weren't covering this? Test billing shows it is covered  Mounjaro copay card  RXBIN: [id number]  PCN: [id number]  GRP: [id number]  ID: [id number]  Expiration Date: 12/31/2025  Trelegy has $50 copay  Signed patient up for Copay card  BIN: [id number]  PCN: [id number]  Grp: [id number]  ID: [id number]  Adherence: satisfactory  Prior was only taking trelegy prn, but after hospitalization she has been taking it correctly  Have you had any missed doses? No  Allergies   Reviewed? Yes  Changes? No    Medication Reconciliation:  OTCs? None  Dispense History Review    _____________________________________________________________________________  Objective     Lab Review  Hepatic dysfunction?  Yes, LFTs wnl but patient has either active/past steatosis liver  Kidney  Use adjusted body weight  IBW: 135lbs  AdjBW: 185 lbs    CrCl: 167; GFR: 65 (77)    CT angio chest for pulmonary embolism Result Date: 12/31/2024  Chronic changes suggesting COPD and mild emphysema with bronchial wall thickening in the bilateral lower lobes and right upper lobe which may indicate acute or chronic bronchitis. Subpleural area of nodularity in the lateral aspect of the right lower lobe measuring up to 2.1 cm which may represent rounded atelectasis however neoplasm is not entirely excluded. Further evaluation is recommended with nonemergent PET/CT. Right upper lobe pleural parenchymal scarring. Prominent but nonpathologically enlarged  mediastinal and bilateral hilar lymph nodes, most likely reactive however these can also be better assessed on PET/CT. Pronounced cardiomegaly.     Medication Review  Current Outpatient Medications   Medication Instructions    ammonium lactate (Lac-Hydrin) 12 % lotion As needed    blood sugar diagnostic (Blood Glucose Test) strip Test once to twice daily    blood-glucose meter misc Test once to twice daily    cyclobenzaprine (FLEXERIL) 10 mg, oral, 3 times daily PRN    fluticasone-umeclidin-vilanter (TRELEGY-ELLIPTA) 100-62.5-25 mcg blister with device 1 puff, inhalation, Daily    furosemide (LASIX) 20 mg, oral, Daily PRN    ipratropium-albuteroL (Combivent Respimat)  mcg/actuation inhaler 1 puff, inhalation, 4 times daily RT    ipratropium-albuteroL (Duo-Neb) 0.5-2.5 mg/3 mL nebulizer solution 3 mL, nebulization, Every 4 hours PRN    lancets misc Test once daily as directed    levothyroxine (SYNTHROID, LEVOXYL) 150 mcg, oral, Daily    lisinopril 5 mg, oral, Daily    metFORMIN (GLUCOPHAGE) 500 mg, oral, 2 times daily    Mounjaro 15 mg, subcutaneous, Every 7 days    omeprazole (PRILOSEC) 40 mg, oral, Daily PRN    oxybutynin XL (Ditropan-XL) 15 mg 24 hr tablet 1 tablet, Daily (0630)    PHENobarbital-hyoscyamine-atropine-scopoloamine (Donnatal) 16.2-0.1037 -0.0194 mg tablet 1-2 tablets, oral, 3 times daily    QUEtiapine (SEROQUEL) 50 mg, oral, Nightly    rosuvastatin (CRESTOR) 40 mg, oral, Daily    valACYclovir (VALTREX) 500 mg, oral, 3 times daily PRN      Vitals  BP Readings from Last 2 Encounters:   03/03/25 144/80   02/03/25 138/82     BMI Readings from Last 1 Encounters:   03/03/25 38.53 kg/m²      Labs  A1C  Lab Results   Component Value Date    HGBA1C 10.0 (H) 02/03/2025    HGBA1C 8.0 (H) 12/10/2024    HGBA1C 7.9 (H) 08/06/2024     BMP  Lab Results   Component Value Date    CALCIUM 10.8 (H) 02/03/2025     02/03/2025    K 3.8 02/03/2025    CO2 30 02/03/2025     02/03/2025    BUN 16 02/03/2025     CREATININE 0.98 2025    EGFR 64 2025     LFTs  Lab Results   Component Value Date    ALT 20 2025    AST 20 2025    ALKPHOS 88 2025    BILITOT 0.5 2025     FLP  Lab Results   Component Value Date    TRIG 250 (H) 2025    CHOL 121 2025    LDLF 48 2023    LDLCALC 56 2025    HDL 36 (L) 2025     Urine Microalbumin  Lab Results   Component Value Date    MICROALBCREA 13.0 2023     Weight Management  Wt Readings from Last 3 Encounters:   25 112 kg (246 lb)   25 117 kg (257 lb)   25 118 kg (260 lb)    _____________________________________________________________________________    Assessment/Plan   Problem List Items Addressed This Visit       Acute bronchitis with chronic obstructive pulmonary disease (COPD) (Multi)    Nicotine dependence    Obesity with body mass index 30 or greater    Relevant Medications    tirzepatide (Mounjaro) 15 mg/0.5 mL pen injector    Persistent cough    DM type 2, not at goal    Relevant Medications    tirzepatide (Mounjaro) 15 mg/0.5 mL pen injector    HTN (hypertension)    Relevant Medications    tirzepatide (Mounjaro) 15 mg/0.5 mL pen injector    Hypercholesterolemia    Relevant Medications    tirzepatide (Mounjaro) 15 mg/0.5 mL pen injector    Hyperglycemia - Primary    Relevant Medications    tirzepatide (Mounjaro) 15 mg/0.5 mL pen injector    Peripheral artery insufficiency (CMS-HCC)    Relevant Medications    tirzepatide (Mounjaro) 15 mg/0.5 mL pen injector    Transient ischemic attack    Relevant Medications    tirzepatide (Mounjaro) 15 mg/0.5 mL pen injector    Acute hypoxic respiratory failure (Multi)       Patient Goals  Fasting B - 130 mg/dL  Postprandial BG: less than 180 mg/dL  A1c: less than 7%;  Is pt at goal? No, 10  Most likely due to courses of steroids and hospitalization  Patient's SMBGs are unknown    COPD  Patient with COPD that is improved, needs further observation, and needs  improvement  Continue GDMT and continue to monitor prn inhaler use for efficacy and optimization  Medication Changes:  CONTINUE  All medications  Monitoring and Education:  Adherence   Rescue inhaler/nebulizer use  Supplemental O2 use  Symptoms of COPD  Exacerbations    DIABETES MELLITUS TYPE 2   Diabetes mellitus Type II, under unsatisfactory control. Patient's hhba1c has worsened to 10% on 2/3/25 from 8.0% on 12/10/24 that had worsened prior at 7.9% in August 2024. Patient denies ADRs from mounjaro but does endorse diarrhea from metformin. Patient agreeable to increased dose.  Reinforced healthy diet, lifestyle, and exercise.  Prescription medication ordered.  Lab work ordered.  Rationale: Further titrate mounjaro for further glycemic benefit, T2DM disease control, weight loss, and CVD/renal protection  Candidate for GLP? Yes; on mounjaro 12.5 mg   Candidate for SGLT2? potentially but does have a history of UTIs  Candidate for DPP4? No; not on glp-1  Candidate for Metformin? Yes; patient on 500 mg BID  Candidate for Insulin? No; A1c >/= 11? No  Medication Changes:  INCREASE Mounjaro to 15 mg once weekly  Future Considerations:  Plan to discontinue metformin after repeat hba1c   Due to steroids course will not discontinue metformin  Monitoring  RFP, LFTs, weight, bmi, hba1c, lipids  Discussed general issues about diabetes pathophysiology and management.  Addressed ADA diet.  Suggested low cholesterol diet.  Encouraged aerobic exercise.  Discussed foot care.    Education:   Diet   Exercise    Clinical Pharmacist follow-up: 4 weeks, Telehealth visit  Upcoming Appointments: 5/5/25    Continue all meds under the continuation of care with the referring provider and clinical pharmacy team.    Thank you,  Jeanette Parekh, PharmD   Clinical Pharmacist Specialist, Primary Care   Phone: [phone]   Fax: [phone]   Email: [email or web address]    Verbal consent to manage patient's drug therapy was obtained from  the patient. They were informed they may decline to participate or withdraw from participation in pharmacy services at any time

## 2025-03-18 DIAGNOSIS — R06.02 SHORTNESS OF BREATH: Primary | ICD-10-CM

## 2025-03-18 RX ORDER — ALBUTEROL SULFATE 90 UG/1
2 INHALANT RESPIRATORY (INHALATION) ONCE
OUTPATIENT
Start: 2025-03-18

## 2025-03-18 RX ORDER — ALBUTEROL SULFATE 0.83 MG/ML
3 SOLUTION RESPIRATORY (INHALATION) ONCE
OUTPATIENT
Start: 2025-03-18 | End: 2025-03-18

## 2025-03-20 ENCOUNTER — TELEPHONE (OUTPATIENT)
Dept: PRIMARY CARE | Facility: CLINIC | Age: 65
End: 2025-03-20
Payer: COMMERCIAL

## 2025-03-20 DIAGNOSIS — J40 BRONCHITIS: Primary | ICD-10-CM

## 2025-03-20 RX ORDER — LEVOFLOXACIN 500 MG/1
500 TABLET, FILM COATED ORAL DAILY
Qty: 10 TABLET | Refills: 0 | Status: SHIPPED | OUTPATIENT
Start: 2025-03-20 | End: 2025-03-30

## 2025-03-20 NOTE — PROGRESS NOTES
Subjective   Reason for Visit: Danelle Jacobs is an 64 y.o. female here for a Medicare Wellness visit.               HPI    Patient Care Team:  Junior Abreu DO as PCP - General (Internal Medicine)  Zaid Dooley DO as PCP - [facility] PCP  Tarik ArmstrongD as Pharmacist (Ambulatory Care)     Review of Systems    Objective   Vitals:  There were no vitals taken for this visit.      Physical Exam    Assessment & Plan

## 2025-04-02 ENCOUNTER — TELEPHONE (OUTPATIENT)
Dept: PRIMARY CARE | Facility: CLINIC | Age: 65
End: 2025-04-02
Payer: COMMERCIAL

## 2025-04-02 NOTE — TELEPHONE ENCOUNTER
Pt thinks mounjaro is making her be constipated  Said she has tried stool softeners and kiah lax and not helping  Wanted to know your advise on what to do next

## 2025-04-03 ENCOUNTER — APPOINTMENT (OUTPATIENT)
Dept: PHARMACY | Facility: HOSPITAL | Age: 65
End: 2025-04-03
Payer: COMMERCIAL

## 2025-04-03 DIAGNOSIS — E11.9 DM TYPE 2, NOT AT GOAL: Primary | ICD-10-CM

## 2025-04-03 ASSESSMENT — ENCOUNTER SYMPTOMS
POLYPHAGIA: 0
WEAKNESS: 1
FATIGUE: 1
VISUAL CHANGE: 0
POLYDIPSIA: 1
BLURRED VISION: 0

## 2025-04-03 NOTE — PROGRESS NOTES
Pharmacy Post-Discharge Clinical Pharmacist Appointment    Patient ID: [id number]  Danelle Jacobs is a 64 y.o. female who presents for Follow Up appointment. She was referred to Clinical Pharmacy Team to complete a post-discharge medication optimization and monitoring visit.  Reason for Referral: cost assistance with trelegy and Mounjaro   Referring Provider and PCP: Junior Abreu DO   Last visit with PCP: 2/3/25  Patient states that she has a history of COPD and is not on home oxygen, usually uses her nebulizer however her power has been out at home since Thursday. Patient went to urgent care for evaluation and was found to be hypoxic with an SpO2 of 79%. Patient was sent to ED for further evaluation. Patient was placed on high flow oxygen in ED, imaging completed showing chronic changes, negative acute however pulmonary and hilar lymph nodes were found and a PET scan, nonemergent is recommended. Patient was treated with IV Solu-Medrol and DuoNeb treatments, remains on high flow oxygen, admitted for further medical management.    Next visit with PCP: 2/3/25    Recent Hospitalization 12/30/2024 - 1/6/2025 (7 days) at [facility]  Acute hypoxic respiratory failure (Multi).   Cough and congestion. Patient states that she has a history of COPD and is not on home oxygen, usually uses her nebulizer however her power has been out at home since Thursday. Patient went to urgent care for evaluation and was found to be hypoxic with an SpO2 of 79%. Patient was sent to ED for further evaluation. Patient was placed on high flow oxygen in ED, imaging completed showing chronic changes, negative acute however pulmonary and hilar lymph nodes were found and a PET scan, nonemergent is recommended. Patient was treated with IV Solu-Medrol and DuoNeb treatments, remains on high flow oxygen, admitted for further medical management.   Suspected obesity hypoventilation syndrome  Acute hypoxic respiratory failure  Opacity c/f  atelectasis vs neoplasm  COPD  Pulmonology Recommendation:  Follow up CT chest and sleep study outpatient  Consider BiPAP in the setting of chronic CO2 retention due to hypoventilation.    Subjective     Past Medical History:   Diagnosis Date    Bronchopneumonia, unspecified organism     Bronchial pneumonia    Cellulitis of right lower limb     Cellulitis of right leg    COPD (chronic obstructive pulmonary disease) (Multi)     Diabetes mellitus (Multi)     Disease of thyroid gland     Fatty (change of) liver, not elsewhere classified     Fatty infiltration of liver    Hypertension     Localized edema 03/04/2015    Leg edema    Other long term (current) drug therapy     Long-term use of high-risk medication    Other malaise     Malaise and fatigue    Otitis media, unspecified, left ear     Left otitis media    Personal history of diseases of the skin and subcutaneous tissue     History of actinic keratosis    Personal history of other diseases of the circulatory system     History of rheumatic fever    Personal history of other diseases of the female genital tract     History of ovarian cyst    Personal history of other diseases of the nervous system and sense organs     History of sciatica    Personal history of other diseases of the nervous system and sense organs     History of carpal tunnel syndrome    Personal history of other diseases of the respiratory system     History of bronchitis    Personal history of other diseases of the respiratory system     History of sinusitis    Personal history of other diseases of the respiratory system     History of chronic obstructive lung disease    Personal history of other endocrine, nutritional and metabolic disease     History of hypothyroidism    Personal history of other endocrine, nutritional and metabolic disease     History of hyperlipidemia    Personal history of other infectious and parasitic diseases     History of herpes labialis    Personal history of other  mental and behavioral disorders     History of depression      Social Hx:  Reports that she quit smoking about 7 years ago. Her smoking use included cigarettes. She has never used smokeless tobacco. She reports that she does not currently use alcohol. She reports that she does not use drugs.   Going back to work this month    Family Hx:  Family History   Problem Relation Name Age of Onset    Other (alzheimers dementia) Mother      Other (cerebrovascular accident) Father      Other (htn) Father       Patient states she fell the other day and is a little sore.     COPD  Patient has been diagnosed with: COPD and Acute exacerbation of chronic bronchitis  Does patient see pulmonology: No March 18, 2025  has not had PFT's completed in last 2 years    Current Regimen  2 L of supplemental O2  Does not have to use oxygen while sitting  Only moving   Trelegy once daily  Taking regularly   Combivent QID prn  Used x1 last week while shopping  DuIEMO prn    Clarifications to above regimen: 2 L oxygen (last time was on 3 L) with exertion, takes combivent prn  Adverse Effects: none   Appropriate technique? Yes  Prior to hospitalization the patient was only taking trelegy prn and not daily. Now that she takes this daily she feels like it has helped decrease SOB, wheezing, etc    Historical Treatment  Was not on oxygen prior to recent hospitalization    Symptom Management  Current symptoms: fatigue  Triggers: exertion and tired cold  Warming   Alleviating factors: oxygen and nebulizer    Exacerbation Hx  When was your last hospitalization for an exacerbation? 1/6 discharge  Last day of abx was Tuesday  Today was last dose of steroid    Rescue Inhaler Use  How often do you use your rescue inhaler? 2 x a day    Immunization History:  Influenza: Date [2024]  COVID: Date [DUE]  RSV: Date [2024]    Diabetes  She presents for her initial diabetic visit. She has type 2 diabetes mellitus. The initial diagnosis of diabetes was made 6 years  ago. Her disease course has been improving. Associated symptoms include fatigue, polydipsia and weakness. Pertinent negatives for diabetes include no blurred vision, no chest pain, no foot paresthesias, no foot ulcerations, no polyphagia, no polyuria and no visual change. Symptoms are stable. Risk factors for coronary artery disease include hypertension, obesity, tobacco exposure, dyslipidemia and diabetes mellitus. Current diabetic treatments: Mounjaro 7.5 mg once weekly and metformin 500 mg twice daily.     Known diabetic complications: peripheral neuropathy, cardiovascular disease, cerebrovascular disease, peripheral vascular disease, and obesity  Does patient follow with Endocrinology: No  **-- patient denies sores or cuts on feet today     Current diabetic medications include:  Mounjaro 15 mg  once weekly x2 doses  Endorses constipation   4 BM a week  Does not drink sufficient water  Denies diarrhea   Metformin 500 mg twice daily      Clarifications to above regimen: takes Mounjaro on Sundays  Adverse Effects: GI upset with metformin  Glucose Readings: patient does not check sugars she states she just got a new meter and hasn't started using it   Glucometer/CGM Type: Trumetrix  BMI: 40; >/= 35? Yes, Class 3 obesity  Current Weight:  3/3/25 - 251 lbs   Last appt: 260 lbs  Lifestyle:   Diet  Exercise    Secondary Prevention:  Statin? Yes - atorvastatin 40 mg daily  ACE-I/ARB? Yes lisinopril 5 mg daily  Aspirin? No    Pertinent PMH Review:  PMH of Pancreatitis: No  PMH of Retinopathy: No  PMH of Urinary Tract Infections: Yes  PMH of MTC: No  HF: No  _____________________________________________________________________________      PHARMACIST MEDICATION REVIEW    Drug Interactions  The following drug interactions were noted:    High anticholinergic burden in elderly  In patients over 65 years of age these can cause adverse events, such as confusion, dizziness and falls. These have been shown to increase patient  mortality.  Anticholinergic Calculator   A score of 3+ is associated with an increased cognitive impairment and mortality  Patient's Score - 12  Scheduled medications  Quetiapine = 3   Recommend to trial trazodone as an alternative  Oxybutynin = 3  PRN  Cyclobenzaprine = 3  Donnatal = 3    Medication System Management  Patient's preferred pharmacy:   [facility]  Medication Access:  Estimated Annual Income of $50,000 a year  Patient qualifies for  PAP   passed - 2024  Combivent has $35 copay- copay coupon is for $35 copay  Patient states [employer] sent her a letter that they weren't covering this? Test billing shows it is covered  Mounjaro copay card  RXBIN: [id number]  PCN: [id number]  GRP: [id number]  ID: [id number]  Expiration Date: 12/31/2025  Trelegy has $50 copay  Signed patient up for Copay card  BIN: [id number]  PCN: [id number]  Grp: [id number]  ID: [id number]  Adherence: satisfactory  Prior was only taking trelegy prn, but after hospitalization she has been taking it correctly  Have you had any missed doses? No  Allergies   Reviewed? Yes  Changes? No    Medication Reconciliation:  OTCs? None  Dispense History Review    _____________________________________________________________________________  Objective     Lab Review  Hepatic dysfunction?  Yes, LFTs wnl but patient has either active/past steatosis liver  Kidney  Use adjusted body weight  IBW: 135lbs  AdjBW: 185 lbs    CrCl: 167; GFR: 65 (77)    CT angio chest for pulmonary embolism Result Date: 12/31/2024  Chronic changes suggesting COPD and mild emphysema with bronchial wall thickening in the bilateral lower lobes and right upper lobe which may indicate acute or chronic bronchitis. Subpleural area of nodularity in the lateral aspect of the right lower lobe measuring up to 2.1 cm which may represent rounded atelectasis however neoplasm is not entirely excluded. Further evaluation is recommended with nonemergent PET/CT. Right upper lobe pleural parenchymal  scarring. Prominent but nonpathologically enlarged mediastinal and bilateral hilar lymph nodes, most likely reactive however these can also be better assessed on PET/CT. Pronounced cardiomegaly.     Medication Review  Current Outpatient Medications   Medication Instructions    ammonium lactate (Lac-Hydrin) 12 % lotion As needed    blood sugar diagnostic (Blood Glucose Test) strip Test once to twice daily    blood-glucose meter misc Test once to twice daily    cyclobenzaprine (FLEXERIL) 10 mg, oral, 3 times daily PRN    fluticasone-umeclidin-vilanter (TRELEGY-ELLIPTA) 100-62.5-25 mcg blister with device 1 puff, inhalation, Daily    furosemide (LASIX) 20 mg, oral, Daily PRN    ipratropium-albuteroL (Combivent Respimat)  mcg/actuation inhaler 1 puff, inhalation, 4 times daily RT    ipratropium-albuteroL (Duo-Neb) 0.5-2.5 mg/3 mL nebulizer solution 3 mL, nebulization, Every 4 hours PRN    lancets misc Test once daily as directed    levothyroxine (SYNTHROID, LEVOXYL) 150 mcg, oral, Daily    lisinopril 5 mg, oral, Daily    metFORMIN (GLUCOPHAGE) 500 mg, oral, 2 times daily    Mounjaro 15 mg, subcutaneous, Every 7 days    omeprazole (PRILOSEC) 40 mg, oral, Daily PRN    oxybutynin XL (Ditropan-XL) 15 mg 24 hr tablet 1 tablet, Daily (0630)    PHENobarbital-hyoscyamine-atropine-scopoloamine (Donnatal) 16.2-0.1037 -0.0194 mg tablet 1-2 tablets, oral, 3 times daily    QUEtiapine (SEROQUEL) 50 mg, oral, Nightly    rosuvastatin (CRESTOR) 40 mg, oral, Daily    valACYclovir (VALTREX) 500 mg, oral, 3 times daily PRN      Vitals  BP Readings from Last 2 Encounters:   03/03/25 144/80   02/03/25 138/82     BMI Readings from Last 1 Encounters:   03/03/25 38.53 kg/m²      Labs  A1C  Lab Results   Component Value Date    HGBA1C 10.0 (H) 02/03/2025    HGBA1C 8.0 (H) 12/10/2024    HGBA1C 7.9 (H) 08/06/2024     BMP  Lab Results   Component Value Date    CALCIUM 10.8 (H) 02/03/2025     02/03/2025    K 3.8 02/03/2025    CO2 30  2025     2025    BUN 16 2025    CREATININE 0.98 2025    EGFR 64 2025     LFTs  Lab Results   Component Value Date    ALT 20 2025    AST 20 2025    ALKPHOS 88 2025    BILITOT 0.5 2025     FLP  Lab Results   Component Value Date    TRIG 250 (H) 2025    CHOL 121 2025    LDLF 48 2023    LDLCALC 56 2025    HDL 36 (L) 2025     Urine Microalbumin  Lab Results   Component Value Date    MICROALBCREA 13.0 2023     Weight Management  Wt Readings from Last 3 Encounters:   25 112 kg (246 lb)   25 117 kg (257 lb)   25 118 kg (260 lb)    _____________________________________________________________________________    Assessment/Plan   Problem List Items Addressed This Visit       DM type 2, not at goal - Primary       Patient Goals  Fasting B - 130 mg/dL  Postprandial BG: less than 180 mg/dL  A1c: less than 7%;  Is pt at goal? No, 10  Most likely due to courses of steroids and hospitalization  Patient's SMBGs are unknown    COPD  Patient with COPD that is improved, needs further observation, and needs improvement  Continue GDMT and continue to monitor prn inhaler use for efficacy and optimization  Medication Changes:  CONTINUE  All medications  Monitoring and Education:  Adherence   Rescue inhaler/nebulizer use  Supplemental O2 use  Symptoms of COPD  Exacerbations    DIABETES MELLITUS TYPE 2   Diabetes mellitus Type II, under unsatisfactory control. Patient's hhba1c has worsened to 10% on 2/3/25 from 8.0% on 12/10/24 that had worsened prior at 7.9% in 2024. Patient is having ADRs from mounjaro and skipped this weeks dose to discuss with me during out appt. She has 2 doses left and desires to continue on 15 mg dose. I am agreeable to this, but we will see how she tolerates the next 2 doses before she picks up the refill at [facility]. Patient to call if ADRs worsen when she takes the next shot on  Sunday.  Reinforced healthy diet, lifestyle, and exercise.  Prescription medication ordered.  Lab work ordered.    Patient does not drink sufficient water and endorses 1-2 powerades a day with no plain water. Patient to increase water intake to 8 glasses a day as she describes hard, difficult to pass stools. She has been using miralax but miralax requires water to be effective. Patient to continue using fiber and increasing daily  fiber to 35 g/day. Increase miralax to 2 times a day if also drinking 8 glasses of water. May continue magnesium hydroxide chewables but please also drink a glass of prune juice with this. Also increase ducolax to 10mg once daily. If these interventions do not create a BM, please call me. I do not recommend an enema and I recommend using a suppository only if there is no complete BM by Monday. Can consider magnesium citrate as well.     Rationale: Further titrate mounjaro for further glycemic benefit, T2DM disease control, weight loss, and CVD/renal protection  Candidate for GLP? Yes; on mounjaro 12.5 mg   Candidate for SGLT2? potentially but does have a history of UTIs  Candidate for DPP4? No; not on glp-1  Candidate for Metformin? Yes; patient on 500 mg BID  Candidate for Insulin? No; A1c >/= 11? No    Medication Changes:  Continue Mounjaro to 15 mg once weekly for two more doses  Call me if your ADRS get worse or do not improve    Future Considerations:  Plan to discontinue metformin after repeat hba1c   Due to steroids course will not discontinue metformin    Monitoring  RFP, LFTs, weight, bmi, hba1c, lipids  Discussed general issues about diabetes pathophysiology and management.  Addressed ADA diet.  Suggested low cholesterol diet.  Encouraged aerobic exercise.  Discussed foot care.    Education:   Diet   Exercise    Clinical Pharmacist follow-up: 2 weeks, Telehealth visit  Upcoming Appointments: 5/5/25    Continue all meds under the continuation of care with the referring provider  and clinical pharmacy team.    Thank you,  Jeanette Parekh, PharmD   Clinical Pharmacist Specialist, Primary Care   Phone: [phone]   Fax: [phone]   Email: [email or web address]    Verbal consent to manage patient's drug therapy was obtained from the patient. They were informed they may decline to participate or withdraw from participation in pharmacy services at any time

## 2025-04-04 ENCOUNTER — APPOINTMENT (OUTPATIENT)
Dept: SLEEP MEDICINE | Facility: CLINIC | Age: 65
End: 2025-04-04
Payer: COMMERCIAL

## 2025-04-04 RX ORDER — POLYETHYLENE GLYCOL 3350 17 G/17G
17 POWDER, FOR SOLUTION ORAL DAILY PRN
COMMUNITY

## 2025-04-04 RX ORDER — BISACODYL 5 MG
5 TABLET, DELAYED RELEASE (ENTERIC COATED) ORAL DAILY PRN
COMMUNITY

## 2025-04-04 NOTE — PATIENT INSTRUCTIONS
DIABETES MELLITUS TYPE 2   Diabetes mellitus Type II, under unsatisfactory control. Patient's hhba1c has worsened to 10% on 2/3/25 from 8.0% on 12/10/24 that had worsened prior at 7.9% in August 2024. Patient is having ADRs from mounjaro and skipped this weeks dose to discuss with me during out appt. She has 2 doses left and desires to continue on 15 mg dose. I am agreeable to this, but we will see how she tolerates the next 2 doses before she picks up the refill at [facility]. Patient to call if ADRs worsen when she takes the next shot on Sunday.  Reinforced healthy diet, lifestyle, and exercise.  Prescription medication ordered.  Lab work ordered.    Patient does not drink sufficient water and endorses 1-2 powerades a day with no plain water. Patient to increase water intake to 8 glasses a day as she describes hard, difficult to pass stools. She has been using miralax but miralax requires water to be effective. Patient to continue using fiber and increasing daily  fiber to 35 g/day. Increase miralax to 2 times a day if also drinking 8 glasses of water. May continue magnesium hydroxide chewables but please also drink a glass of prune juice with this. Also increase ducolax to 10mg once daily. If these interventions do not create a BM, please call me. I do not recommend an enema and I recommend using a suppository only if there is no complete BM by Monday. Can consider magnesium citrate as well.     Rationale: Further titrate mounjaro for further glycemic benefit, T2DM disease control, weight loss, and CVD/renal protection  Candidate for GLP? Yes; on mounjaro 12.5 mg   Candidate for SGLT2? potentially but does have a history of UTIs  Candidate for DPP4? No; not on glp-1  Candidate for Metformin? Yes; patient on 500 mg BID  Candidate for Insulin? No; A1c >/= 11? No    Medication Changes:  Continue Mounjaro to 15 mg once weekly for two more doses  Call me if your ADRS get worse or do not improve

## 2025-04-14 ENCOUNTER — APPOINTMENT (OUTPATIENT)
Dept: PHARMACY | Facility: HOSPITAL | Age: 65
End: 2025-04-14
Payer: COMMERCIAL

## 2025-04-14 DIAGNOSIS — J44.1 COPD WITH ACUTE EXACERBATION (MULTI): ICD-10-CM

## 2025-04-14 DIAGNOSIS — E11.9 DM TYPE 2, NOT AT GOAL: ICD-10-CM

## 2025-04-14 DIAGNOSIS — I73.9 PERIPHERAL ARTERY INSUFFICIENCY (CMS-HCC): Primary | ICD-10-CM

## 2025-04-14 ASSESSMENT — ENCOUNTER SYMPTOMS
BLURRED VISION: 0
FATIGUE: 1
POLYPHAGIA: 0
WEAKNESS: 1
POLYDIPSIA: 1
VISUAL CHANGE: 0

## 2025-04-14 NOTE — PROGRESS NOTES
Pharmacy Post-Discharge Clinical Pharmacist Appointment    Patient ID: [id number]  Danelle Jacobs is a 64 y.o. female who presents for Follow Up appointment. She was referred to Clinical Pharmacy Team to complete a post-discharge medication optimization and monitoring visit.  Reason for Referral: cost assistance with trelegy and Mounjaro   Referring Provider and PCP: Junior Abreu DO   Last visit with PCP: 2/3/25  Patient states that she has a history of COPD and is not on home oxygen, usually uses her nebulizer however her power has been out at home since Thursday. Patient went to urgent care for evaluation and was found to be hypoxic with an SpO2 of 79%. Patient was sent to ED for further evaluation. Patient was placed on high flow oxygen in ED, imaging completed showing chronic changes, negative acute however pulmonary and hilar lymph nodes were found and a PET scan, nonemergent is recommended. Patient was treated with IV Solu-Medrol and DuoNeb treatments, remains on high flow oxygen, admitted for further medical management.    Next visit with PCP: 2/3/25    Recent Hospitalization 12/30/2024 - 1/6/2025 (7 days) at [facility]  Acute hypoxic respiratory failure (Multi).   Cough and congestion. Patient states that she has a history of COPD and is not on home oxygen, usually uses her nebulizer however her power has been out at home since Thursday. Patient went to urgent care for evaluation and was found to be hypoxic with an SpO2 of 79%. Patient was sent to ED for further evaluation. Patient was placed on high flow oxygen in ED, imaging completed showing chronic changes, negative acute however pulmonary and hilar lymph nodes were found and a PET scan, nonemergent is recommended. Patient was treated with IV Solu-Medrol and DuoNeb treatments, remains on high flow oxygen, admitted for further medical management.   Suspected obesity hypoventilation syndrome  Acute hypoxic respiratory failure  Opacity c/f  atelectasis vs neoplasm  COPD  Pulmonology Recommendation:  Follow up CT chest and sleep study outpatient  Consider BiPAP in the setting of chronic CO2 retention due to hypoventilation.    Subjective     Past Medical History:   Diagnosis Date    Bronchopneumonia, unspecified organism     Bronchial pneumonia    Cellulitis of right lower limb     Cellulitis of right leg    COPD (chronic obstructive pulmonary disease) (Multi)     Diabetes mellitus (Multi)     Disease of thyroid gland     Fatty (change of) liver, not elsewhere classified     Fatty infiltration of liver    Hypertension     Localized edema 03/04/2015    Leg edema    Other long term (current) drug therapy     Long-term use of high-risk medication    Other malaise     Malaise and fatigue    Otitis media, unspecified, left ear     Left otitis media    Personal history of diseases of the skin and subcutaneous tissue     History of actinic keratosis    Personal history of other diseases of the circulatory system     History of rheumatic fever    Personal history of other diseases of the female genital tract     History of ovarian cyst    Personal history of other diseases of the nervous system and sense organs     History of sciatica    Personal history of other diseases of the nervous system and sense organs     History of carpal tunnel syndrome    Personal history of other diseases of the respiratory system     History of bronchitis    Personal history of other diseases of the respiratory system     History of sinusitis    Personal history of other diseases of the respiratory system     History of chronic obstructive lung disease    Personal history of other endocrine, nutritional and metabolic disease     History of hypothyroidism    Personal history of other endocrine, nutritional and metabolic disease     History of hyperlipidemia    Personal history of other infectious and parasitic diseases     History of herpes labialis    Personal history of other  mental and behavioral disorders     History of depression      Social Hx:  Reports that she quit smoking about 7 years ago. Her smoking use included cigarettes. She has never used smokeless tobacco. She reports that she does not currently use alcohol. She reports that she does not use drugs.   Going back to work this month    Family Hx:  Family History   Problem Relation Name Age of Onset    Other (alzheimers dementia) Mother      Other (cerebrovascular accident) Father      Other (htn) Father       Patient states she fell the other day and is a little sore.     COPD  Patient has been diagnosed with: COPD and Acute exacerbation of chronic bronchitis  Does patient see pulmonology: No March 18, 2025  has not had PFT's completed in last 2 years    Current Regimen  2 L of supplemental O2  Does not have to use oxygen while sitting  Only moving   Trelegy once daily  Taking regularly   Combivent QID prn    Duonebs prn x1    Clarifications to above regimen: 2 L oxygen (last time was on 3 L) with exertion, takes combivent prn  Adverse Effects: none   Appropriate technique? Yes  Prior to hospitalization the patient was only taking trelegy prn and not daily. Now that she takes this daily she feels like it has helped decrease SOB, wheezing, etc    Historical Treatment  Was not on oxygen prior to recent hospitalization    Symptom Management  Current symptoms: fatigue  Triggers: exertion and tired cold  Warming   Alleviating factors: oxygen and nebulizer    Exacerbation Hx  When was your last hospitalization for an exacerbation? 1/6 discharge  Last day of abx was Tuesday  Today was last dose of steroid    Rescue Inhaler Use  How often do you use your rescue inhaler? 2 x a day    Immunization History:  Influenza: Date [2024]  COVID: Date [DUE]  RSV: Date [2024]    Diabetes  She presents for her initial diabetic visit. She has type 2 diabetes mellitus. The initial diagnosis of diabetes was made 6 years ago. Her disease course has  been improving. Associated symptoms include fatigue, polydipsia and weakness. Pertinent negatives for diabetes include no blurred vision, no chest pain, no foot paresthesias, no foot ulcerations, no polyphagia, no polyuria and no visual change. Symptoms are stable. Risk factors for coronary artery disease include hypertension, obesity, tobacco exposure, dyslipidemia and diabetes mellitus. Current diabetic treatments: Mounjaro 7.5 mg once weekly and metformin 500 mg twice daily.     Known diabetic complications: peripheral neuropathy, cardiovascular disease, cerebrovascular disease, peripheral vascular disease, and obesity  Does patient follow with Endocrinology: No  **-- patient denies sores or cuts on feet today     Current diabetic medications include:  Mounjaro 15 mg  once weekly x2 doses  Endorses constipation   4 BM a week  Does not drink sufficient water - diarrhea  Denies diarrhea   Metformin 500 mg twice daily      Clarifications to above regimen: takes Mounjaro on Sundays  Adverse Effects: GI upset with metformin  Glucose Readings: patient does not check sugars she states she just got a new meter and hasn't started using it   Glucometer/CGM Type: Trumetrix    BMI: 40; >/= 35? Yes, Class 3 obesity  Current Weight:  3/3/25 - 251 lbs   Last appt: 260 lbs    Lifestyle:   Diet  Exercise    Secondary Prevention:  Statin? Yes - atorvastatin 40 mg daily  ACE-I/ARB? Yes lisinopril 5 mg daily  Aspirin? No    Pertinent PMH Review:  PMH of Pancreatitis: No  PMH of Retinopathy: No  PMH of Urinary Tract Infections: Yes  PMH of MTC: No  HF: No  _____________________________________________________________________________      PHARMACIST MEDICATION REVIEW    Drug Interactions  The following drug interactions were noted:    High anticholinergic burden in elderly  In patients over 65 years of age these can cause adverse events, such as confusion, dizziness and falls. These have been shown to increase patient  mortality.  Anticholinergic Calculator   A score of 3+ is associated with an increased cognitive impairment and mortality  Patient's Score - 12  Scheduled medications  Quetiapine = 3   Recommend to trial trazodone as an alternative  Oxybutynin = 3  PRN  Cyclobenzaprine = 3  Donnatal = 3    Medication System Management  Patient's preferred pharmacy:   [facility]  Medication Access:  Estimated Annual Income of $50,000 a year  Patient qualifies for  PAP   passed - 2024  Combivent has $35 copay- copay coupon is for $35 copay  Patient states [employer] sent her a letter that they weren't covering this? Test billing shows it is covered  Mounjaro copay card  RXBIN: [id number]  PCN: [id number]  GRP: [id number]  ID: [id number]  Expiration Date: 12/31/2025  Trelegy has $50 copay  Signed patient up for Copay card  BIN: [id number]  PCN: [id number]  Grp: [id number]  ID: [id number]  Adherence: satisfactory  Prior was only taking trelegy prn, but after hospitalization she has been taking it correctly  Have you had any missed doses? No  Allergies   Reviewed? Yes  Changes? No    Medication Reconciliation:  OTCs? None  Dispense History Review    _____________________________________________________________________________  Objective     Lab Review  Hepatic dysfunction?  Yes, LFTs wnl but patient has either active/past steatosis liver  Kidney  Use adjusted body weight  IBW: 135lbs  AdjBW: 185 lbs    CrCl: 167; GFR: 65 (77)  CT angio chest for pulmonary embolism Result Date: 12/31/2024  Chronic changes suggesting COPD and mild emphysema with bronchial wall thickening in the bilateral lower lobes and right upper lobe which may indicate acute or chronic bronchitis. Subpleural area of nodularity in the lateral aspect of the right lower lobe measuring up to 2.1 cm which may represent rounded atelectasis however neoplasm is not entirely excluded. Further evaluation is recommended with nonemergent PET/CT. Right upper lobe pleural parenchymal  scarring. Prominent but nonpathologically enlarged mediastinal and bilateral hilar lymph nodes, most likely reactive however these can also be better assessed on PET/CT. Pronounced cardiomegaly.     Medication Review  Current Outpatient Medications   Medication Instructions    ammonium lactate (Lac-Hydrin) 12 % lotion As needed    bisacodyl (DULCOLAX) 5 mg, oral, Daily PRN, Do not crush, chew, or split.    blood sugar diagnostic (Blood Glucose Test) strip Test once to twice daily    blood-glucose meter misc Test once to twice daily    cyclobenzaprine (FLEXERIL) 10 mg, oral, 3 times daily PRN    fluticasone-umeclidin-vilanter (TRELEGY-ELLIPTA) 100-62.5-25 mcg blister with device 1 puff, inhalation, Daily    furosemide (LASIX) 20 mg, oral, Daily PRN    ipratropium-albuteroL (Combivent Respimat)  mcg/actuation inhaler 1 puff, inhalation, 4 times daily RT    ipratropium-albuteroL (Duo-Neb) 0.5-2.5 mg/3 mL nebulizer solution 3 mL, nebulization, Every 4 hours PRN    lancets misc Test once daily as directed    levothyroxine (SYNTHROID, LEVOXYL) 150 mcg, oral, Daily    lisinopril 5 mg, oral, Daily    magnesium hydroxide (Nolen Chews) 311 mg chewable tablet 1,200 mg, oral, Every 4 hours PRN    metFORMIN (GLUCOPHAGE) 500 mg, oral, 2 times daily    Mounjaro 15 mg, subcutaneous, Every 7 days    omeprazole (PRILOSEC) 40 mg, oral, Daily PRN    oxybutynin XL (Ditropan-XL) 15 mg 24 hr tablet 1 tablet, Daily (0630)    PHENobarbital-hyoscyamine-atropine-scopoloamine (Donnatal) 16.2-0.1037 -0.0194 mg tablet 1-2 tablets, oral, 3 times daily    polyethylene glycol (GLYCOLAX, MIRALAX) 17 g, oral, Daily PRN    QUEtiapine (SEROQUEL) 50 mg, oral, Nightly    rosuvastatin (CRESTOR) 40 mg, oral, Daily    valACYclovir (VALTREX) 500 mg, oral, 3 times daily PRN      Vitals  BP Readings from Last 2 Encounters:   03/03/25 144/80   02/03/25 138/82     BMI Readings from Last 1 Encounters:   03/03/25 38.53 kg/m²      Labs  A1C  Lab Results    Component Value Date    HGBA1C 10.0 (H) 2025    HGBA1C 8.0 (H) 12/10/2024    HGBA1C 7.9 (H) 2024     BMP  Lab Results   Component Value Date    CALCIUM 10.8 (H) 2025     2025    K 3.8 2025    CO2 30 2025     2025    BUN 16 2025    CREATININE 0.98 2025    EGFR 64 2025     LFTs  Lab Results   Component Value Date    ALT 20 2025    AST 20 2025    ALKPHOS 88 2025    BILITOT 0.5 2025     FLP  Lab Results   Component Value Date    TRIG 250 (H) 2025    CHOL 121 2025    LDLF 48 2023    LDLCALC 56 2025    HDL 36 (L) 2025     Urine Microalbumin  Lab Results   Component Value Date    MICROALBCREA 13.0 2023     Weight Management  Wt Readings from Last 3 Encounters:   25 112 kg (246 lb)   25 117 kg (257 lb)   25 118 kg (260 lb)    _____________________________________________________________________________    Assessment/Plan   Problem List Items Addressed This Visit       DM type 2, not at goal    Peripheral artery insufficiency ([facility]) - Primary     Patient Goals  Fasting B - 130 mg/dL  Postprandial BG: less than 180 mg/dL  A1c: less than 7%;  Is pt at goal? No, 10  Most likely due to courses of steroids and hospitalization  Patient's SMBGs are unknown    COPD  Patient with COPD that is improved, needs further observation, and needs improvement  Continue GDMT and continue to monitor prn inhaler use for efficacy and optimization  Medication Changes:  CONTINUE  All medications  Monitoring and Education:  Adherence   Rescue inhaler/nebulizer use  Supplemental O2 use  Symptoms of COPD  Exacerbations    DIABETES MELLITUS TYPE 2   Diabetes mellitus Type II, under unsatisfactory control. Patient's hhba1c has worsened to 10% on 2/3/25 from 8.0% on 12/10/24 that had worsened prior at 7.9% in 2024. Reinforced healthy diet, lifestyle, and exercise.  Prescription medication  ordered.  Lab work ordered.   Rationale: Further titrate mounjaro for further glycemic benefit, T2DM disease control, weight loss, and CVD/renal protection  Candidate for GLP? Yes; on mounjaro 12.5 mg   Candidate for SGLT2? potentially but does have a history of UTIs  Candidate for DPP4? No; not on glp-1  Candidate for Metformin? Yes; patient on 500 mg BID  Candidate for Insulin? No; A1c >/= 11? No    Medication Changes:  Continue Mounjaro to 15 mg once weekly for two more doses  Call me if your ADRS get worse or do not improve    Future Considerations:  Plan to discontinue metformin after repeat hba1c   Due to steroids course will not discontinue metformin    Monitoring  RFP, LFTs, weight, bmi, hba1c, lipids  Discussed general issues about diabetes pathophysiology and management.  Addressed ADA diet.  Suggested low cholesterol diet.  Encouraged aerobic exercise.  Discussed foot care.    Education:   Diet   Exercise    Clinical Pharmacist follow-up: 2 month, Telehealth visit    Continue all meds under the continuation of care with the referring provider and clinical pharmacy team.    Thank you,  Jeanette Parekh, PharmD   Clinical Pharmacist Specialist, Primary Care   Phone: [phone]   Fax: [phone]   Email: [email or web address]    Verbal consent to manage patient's drug therapy was obtained from the patient. They were informed they may decline to participate or withdraw from participation in pharmacy services at any time

## 2025-04-25 ENCOUNTER — HOSPITAL ENCOUNTER (OUTPATIENT)
Dept: RADIOLOGY | Facility: CLINIC | Age: 65
Discharge: HOME | End: 2025-04-25
Payer: COMMERCIAL

## 2025-04-25 ENCOUNTER — TELEPHONE (OUTPATIENT)
Dept: PRIMARY CARE | Facility: CLINIC | Age: 65
End: 2025-04-25
Payer: COMMERCIAL

## 2025-04-25 VITALS — BODY MASS INDEX: 37.59 KG/M2 | WEIGHT: 240 LBS

## 2025-04-25 DIAGNOSIS — Z12.31 ENCOUNTER FOR SCREENING MAMMOGRAM FOR MALIGNANT NEOPLASM OF BREAST: ICD-10-CM

## 2025-04-25 DIAGNOSIS — R92.8 ABNORMAL MAMMOGRAM: ICD-10-CM

## 2025-04-25 PROCEDURE — 77062 BREAST TOMOSYNTHESIS BI: CPT

## 2025-04-25 PROCEDURE — 76642 ULTRASOUND BREAST LIMITED: CPT | Mod: RT

## 2025-04-25 PROCEDURE — 76982 USE 1ST TARGET LESION: CPT | Mod: RT

## 2025-05-03 ENCOUNTER — APPOINTMENT (OUTPATIENT)
Dept: SLEEP MEDICINE | Facility: CLINIC | Age: 65
End: 2025-05-03
Payer: COMMERCIAL

## 2025-05-05 ENCOUNTER — APPOINTMENT (OUTPATIENT)
Dept: PRIMARY CARE | Facility: CLINIC | Age: 65
End: 2025-05-05
Payer: COMMERCIAL

## 2025-05-05 VITALS
HEART RATE: 93 BPM | WEIGHT: 235 LBS | OXYGEN SATURATION: 93 % | BODY MASS INDEX: 36.81 KG/M2 | SYSTOLIC BLOOD PRESSURE: 142 MMHG | DIASTOLIC BLOOD PRESSURE: 8 MMHG

## 2025-05-05 DIAGNOSIS — E11.9 DM TYPE 2, NOT AT GOAL: ICD-10-CM

## 2025-05-05 LAB — HBA1C MFR BLD: 6.3 % (ref 4.2–6.5)

## 2025-05-05 PROCEDURE — 3077F SYST BP >= 140 MM HG: CPT | Performed by: STUDENT IN AN ORGANIZED HEALTH CARE EDUCATION/TRAINING PROGRAM

## 2025-05-05 PROCEDURE — 1159F MED LIST DOCD IN RCRD: CPT | Performed by: STUDENT IN AN ORGANIZED HEALTH CARE EDUCATION/TRAINING PROGRAM

## 2025-05-05 PROCEDURE — 1123F ACP DISCUSS/DSCN MKR DOCD: CPT | Performed by: STUDENT IN AN ORGANIZED HEALTH CARE EDUCATION/TRAINING PROGRAM

## 2025-05-05 PROCEDURE — 1036F TOBACCO NON-USER: CPT | Performed by: STUDENT IN AN ORGANIZED HEALTH CARE EDUCATION/TRAINING PROGRAM

## 2025-05-05 PROCEDURE — 83036 HEMOGLOBIN GLYCOSYLATED A1C: CPT | Mod: CLIA WAIVED TEST | Performed by: STUDENT IN AN ORGANIZED HEALTH CARE EDUCATION/TRAINING PROGRAM

## 2025-05-05 PROCEDURE — 4010F ACE/ARB THERAPY RXD/TAKEN: CPT | Performed by: STUDENT IN AN ORGANIZED HEALTH CARE EDUCATION/TRAINING PROGRAM

## 2025-05-05 PROCEDURE — 3044F HG A1C LEVEL LT 7.0%: CPT | Performed by: STUDENT IN AN ORGANIZED HEALTH CARE EDUCATION/TRAINING PROGRAM

## 2025-05-05 PROCEDURE — 3078F DIAST BP <80 MM HG: CPT | Performed by: STUDENT IN AN ORGANIZED HEALTH CARE EDUCATION/TRAINING PROGRAM

## 2025-05-05 PROCEDURE — 99214 OFFICE O/P EST MOD 30 MIN: CPT | Performed by: STUDENT IN AN ORGANIZED HEALTH CARE EDUCATION/TRAINING PROGRAM

## 2025-05-05 ASSESSMENT — ENCOUNTER SYMPTOMS: DEPRESSION: 0

## 2025-05-05 NOTE — PROGRESS NOTES
Subjective   Patient ID: Danelle Jacobs is a 64 y.o. female who presents for Follow-up.    HPI     Review of Systems    Objective   BP (!) 142/8 (BP Location: Left arm, Patient Position: Sitting, BP Cuff Size: Large adult)   Pulse 93   Wt 107 kg (235 lb)   SpO2 93%   BMI 36.81 kg/m²     Physical Exam    Assessment/Plan

## 2025-05-05 NOTE — PROGRESS NOTES
Subjective   Reason for Visit: Danelle Jacobs is an 64 y.o. female here for a office visit.         HPI    COPD: Patient has mild emphysema and COPD and CT and Trelegy albuterol) was pulmonology in about 2 weeks.  On chronic oxygen since hospitalization a few months ago.  Still drops to 87% of room air upon ambulation.  Trying a sleep study done     Diabetes: A1c 10 drinks Diet Coke and coffee does not drink well water.  Currently on Mounjaro lost 20 pounds in 2 months doing well with this.  Watching what she is eating does not check her blood sugar although I do expect to be little better given her weight loss. Not good at taking metformin, upsets her stomach.     Patient Care Team:  Junior Abreu DO as PCP - General (Internal Medicine)  Junior Abreu DO as PCP - [facility] PCP  Jeanette Parekh PharmD as Pharmacist (Ambulatory Care)     Review of Systems   All other systems reviewed and are negative.      Objective   Vitals:  Vitals:    05/05/25 1449   BP: (!) 142/8   Pulse: 93   SpO2: 93%       There were no vitals taken for this visit.      Physical Exam  Constitutional:       Appearance: Normal appearance.   HENT:      Head: Normocephalic and atraumatic.      Right Ear: Tympanic membrane and ear canal normal.      Left Ear: Tympanic membrane and ear canal normal.      Mouth/Throat:      Mouth: Mucous membranes are moist.      Pharynx: Oropharynx is clear.   Eyes:      Extraocular Movements: Extraocular movements intact.      Conjunctiva/sclera: Conjunctivae normal.      Pupils: Pupils are equal, round, and reactive to light.   Cardiovascular:      Rate and Rhythm: Normal rate and regular rhythm.      Pulses: Normal pulses.      Heart sounds: Normal heart sounds.   Pulmonary:      Effort: Pulmonary effort is normal.      Breath sounds: Normal breath sounds.   Abdominal:      General: Abdomen is flat. Bowel sounds are normal.      Palpations: Abdomen is soft.   Musculoskeletal:         General: Normal range of motion.       Cervical back: Normal range of motion and neck supple.   Skin:     General: Skin is warm and dry.      Capillary Refill: Capillary refill takes 2 to 3 seconds.   Neurological:      General: No focal deficit present.      Mental Status: She is alert and oriented to person, place, and time. Mental status is at baseline.   Psychiatric:         Mood and Affect: Mood normal.         Behavior: Behavior normal.         Thought Content: Thought content normal.         Judgment: Judgment normal.         Assessment & Plan       1. DM type 2, not at goal  - went from  10 to 6.3  - on mounjaor lost 10-20 lbs, doing well no issues  - POCT Glycosylated Hemoglobin (HGB A1C) docked device  - doing well no issues  - follow 3 months

## 2025-05-06 ENCOUNTER — HOSPITAL ENCOUNTER (OUTPATIENT)
Dept: RADIOLOGY | Facility: CLINIC | Age: 65
Discharge: HOME | End: 2025-05-06
Payer: COMMERCIAL

## 2025-05-06 DIAGNOSIS — R91.8 OTHER NONSPECIFIC ABNORMAL FINDING OF LUNG FIELD: ICD-10-CM

## 2025-05-06 PROCEDURE — 71250 CT THORAX DX C-: CPT

## 2025-05-06 PROCEDURE — 71250 CT THORAX DX C-: CPT | Performed by: RADIOLOGY

## 2025-05-28 ENCOUNTER — HOSPITAL ENCOUNTER (OUTPATIENT)
Dept: RESPIRATORY THERAPY | Facility: HOSPITAL | Age: 65
Discharge: HOME | End: 2025-05-28
Payer: COMMERCIAL

## 2025-05-28 DIAGNOSIS — R06.02 SHORTNESS OF BREATH: ICD-10-CM

## 2025-05-28 LAB
MGC ASCENT PFT - FEV1 - POST: 1.21
MGC ASCENT PFT - FEV1 - PRE: 1.06
MGC ASCENT PFT - FEV1 - PREDICTED: 2.38
MGC ASCENT PFT - FVC - POST: 1.91
MGC ASCENT PFT - FVC - PRE: 1.79
MGC ASCENT PFT - FVC - PREDICTED: 3.06

## 2025-05-28 PROCEDURE — 94729 DIFFUSING CAPACITY: CPT

## 2025-05-28 PROCEDURE — 94726 PLETHYSMOGRAPHY LUNG VOLUMES: CPT | Performed by: INTERNAL MEDICINE

## 2025-05-28 PROCEDURE — 94060 EVALUATION OF WHEEZING: CPT | Performed by: INTERNAL MEDICINE

## 2025-05-28 PROCEDURE — 94729 DIFFUSING CAPACITY: CPT | Performed by: INTERNAL MEDICINE

## 2025-05-28 PROCEDURE — 2500000001 HC RX 250 WO HCPCS SELF ADMINISTERED DRUGS (ALT 637 FOR MEDICARE OP): Performed by: INTERNAL MEDICINE

## 2025-05-28 PROCEDURE — 94618 PULMONARY STRESS TESTING: CPT | Performed by: INTERNAL MEDICINE

## 2025-05-28 RX ORDER — ALBUTEROL SULFATE 0.83 MG/ML
3 SOLUTION RESPIRATORY (INHALATION) ONCE
Status: COMPLETED | OUTPATIENT
Start: 2025-05-28 | End: 2025-05-28

## 2025-05-28 RX ORDER — ALBUTEROL SULFATE 90 UG/1
2 INHALANT RESPIRATORY (INHALATION) ONCE
Status: COMPLETED | OUTPATIENT
Start: 2025-05-28 | End: 2025-05-28

## 2025-05-28 RX ADMIN — ALBUTEROL SULFATE 2 PUFF: 90 AEROSOL, METERED RESPIRATORY (INHALATION) at 13:50

## 2025-06-23 ENCOUNTER — APPOINTMENT (OUTPATIENT)
Dept: PHARMACY | Facility: HOSPITAL | Age: 65
End: 2025-06-23
Payer: COMMERCIAL

## 2025-06-23 DIAGNOSIS — J20.9 ACUTE BRONCHITIS WITH CHRONIC OBSTRUCTIVE PULMONARY DISEASE (COPD) (MULTI): ICD-10-CM

## 2025-06-23 DIAGNOSIS — E11.9 DM TYPE 2, NOT AT GOAL: Primary | ICD-10-CM

## 2025-06-23 DIAGNOSIS — R05.3 PERSISTENT COUGH: ICD-10-CM

## 2025-06-23 DIAGNOSIS — R73.9 HYPERGLYCEMIA: ICD-10-CM

## 2025-06-23 DIAGNOSIS — F17.200 NICOTINE DEPENDENCE, UNCOMPLICATED, UNSPECIFIED NICOTINE PRODUCT TYPE: ICD-10-CM

## 2025-06-23 DIAGNOSIS — J44.0 ACUTE BRONCHITIS WITH CHRONIC OBSTRUCTIVE PULMONARY DISEASE (COPD) (MULTI): ICD-10-CM

## 2025-06-23 DIAGNOSIS — E66.9 OBESITY WITH BODY MASS INDEX 30 OR GREATER: ICD-10-CM

## 2025-06-23 DIAGNOSIS — I73.9 PERIPHERAL ARTERY INSUFFICIENCY: ICD-10-CM

## 2025-06-23 DIAGNOSIS — I10 PRIMARY HYPERTENSION: ICD-10-CM

## 2025-06-23 DIAGNOSIS — G45.9 TRANSIENT ISCHEMIC ATTACK: ICD-10-CM

## 2025-06-23 DIAGNOSIS — E78.00 HYPERCHOLESTEROLEMIA: ICD-10-CM

## 2025-06-23 ASSESSMENT — ENCOUNTER SYMPTOMS
POLYPHAGIA: 0
FATIGUE: 1
BLURRED VISION: 0
VISUAL CHANGE: 0
WEAKNESS: 1
POLYDIPSIA: 1

## 2025-06-23 NOTE — PROGRESS NOTES
Pharmacy Post-Discharge Clinical Pharmacist Appointment    Patient ID: [id number]  Danelle Jacobs is a 64 y.o. female who presents for Follow Up appointment. She was referred to Clinical Pharmacy Team to complete a post-discharge medication optimization and monitoring visit.  Reason for Referral: cost assistance with trelegy and Mounjaro   Referring Provider and PCP: Junior Abreu DO   Last visit with PCP: 2/3/25  Patient states that she has a history of COPD and is not on home oxygen, usually uses her nebulizer however her power has been out at home since Thursday. Patient went to urgent care for evaluation and was found to be hypoxic with an SpO2 of 79%. Patient was sent to ED for further evaluation. Patient was placed on high flow oxygen in ED, imaging completed showing chronic changes, negative acute however pulmonary and hilar lymph nodes were found and a PET scan, nonemergent is recommended. Patient was treated with IV Solu-Medrol and DuoNeb treatments, remains on high flow oxygen, admitted for further medical management.    Next visit with PCP: 2/3/25    Recent Hospitalization 12/30/2024 - 1/6/2025 (7 days) at [facility]  Acute hypoxic respiratory failure (Multi).   Cough and congestion. Patient states that she has a history of COPD and is not on home oxygen, usually uses her nebulizer however her power has been out at home since Thursday. Patient went to urgent care for evaluation and was found to be hypoxic with an SpO2 of 79%. Patient was sent to ED for further evaluation. Patient was placed on high flow oxygen in ED, imaging completed showing chronic changes, negative acute however pulmonary and hilar lymph nodes were found and a PET scan, nonemergent is recommended. Patient was treated with IV Solu-Medrol and DuoNeb treatments, remains on high flow oxygen, admitted for further medical management.   Suspected obesity hypoventilation syndrome  Acute hypoxic respiratory failure  Opacity c/f  atelectasis vs neoplasm  COPD  Pulmonology Recommendation:  Follow up CT chest and sleep study outpatient  Consider BiPAP in the setting of chronic CO2 retention due to hypoventilation.    Subjective     Past Medical History:   Diagnosis Date    Bronchopneumonia, unspecified organism     Bronchial pneumonia    Cellulitis of right lower limb     Cellulitis of right leg    COPD (chronic obstructive pulmonary disease) (Multi)     Diabetes mellitus (Multi)     Disease of thyroid gland     Fatty (change of) liver, not elsewhere classified     Fatty infiltration of liver    Hypertension     Localized edema 03/04/2015    Leg edema    Other long term (current) drug therapy     Long-term use of high-risk medication    Other malaise     Malaise and fatigue    Otitis media, unspecified, left ear     Left otitis media    Personal history of diseases of the skin and subcutaneous tissue     History of actinic keratosis    Personal history of other diseases of the circulatory system     History of rheumatic fever    Personal history of other diseases of the female genital tract     History of ovarian cyst    Personal history of other diseases of the nervous system and sense organs     History of sciatica    Personal history of other diseases of the nervous system and sense organs     History of carpal tunnel syndrome    Personal history of other diseases of the respiratory system     History of bronchitis    Personal history of other diseases of the respiratory system     History of sinusitis    Personal history of other diseases of the respiratory system     History of chronic obstructive lung disease    Personal history of other endocrine, nutritional and metabolic disease     History of hypothyroidism    Personal history of other endocrine, nutritional and metabolic disease     History of hyperlipidemia    Personal history of other infectious and parasitic diseases     History of herpes labialis    Personal history of other  mental and behavioral disorders     History of depression      Social Hx:  Reports that she quit smoking about 7 years ago. Her smoking use included cigarettes. She has never used smokeless tobacco. She reports that she does not currently use alcohol. She reports that she does not use drugs.   Going back to work this month    Family Hx:  Family History   Problem Relation Name Age of Onset    Other (alzheimers dementia) Mother      Other (cerebrovascular accident) Father      Other (htn) Father       Patient states she fell the other day and is a little sore.     COPD  Patient has been diagnosed with: COPD and Acute exacerbation of chronic bronchitis  Does patient see pulmonology: No   Pulm workup completed at end of May 2025 - worsened    Current Regimen  3 L of supplemental O2  Does not have to use oxygen while sitting  Only moving     Trelegy once daily  Taking regularly   Combivent QID prn  Duonebs prn x1    Clarifications to above regimen: 2 L oxygen (last time was on 3 L) with exertion, takes combivent prn  Adverse Effects: none   Appropriate technique? Yes  Prior to hospitalization the patient was only taking trelegy prn and not daily. Now that she takes this daily she feels like it has helped decrease SOB, wheezing, etc    Symptom Management  Current symptoms: fatigue  Triggers: exertion and tired cold  Warming   Alleviating factors: oxygen and nebulizer    Exacerbation Hx  When was your last hospitalization for an exacerbation? 1/6/    Rescue Inhaler Use  How often do you use your rescue inhaler? 2x a day    Immunization History:  Influenza: Date [2024]  COVID: Date [DUE]  RSV: Date [2024]    Diabetes  She presents for her initial diabetic visit. She has type 2 diabetes mellitus. The initial diagnosis of diabetes was made 6 years ago. Her disease course has been improving. Associated symptoms include fatigue, polydipsia and weakness. Pertinent negatives for diabetes include no blurred vision, no chest pain,  no foot paresthesias, no foot ulcerations, no polyphagia, no polyuria and no visual change. Symptoms are stable. Risk factors for coronary artery disease include hypertension, obesity, tobacco exposure, dyslipidemia and diabetes mellitus. Current diabetic treatments: Mounjaro 7.5 mg once weekly and metformin 500 mg twice daily.     Known diabetic complications: peripheral neuropathy, cardiovascular disease, cerebrovascular disease, peripheral vascular disease, and obesity  Does patient follow with Endocrinology: No  **-- patient denies sores or cuts on feet today     Current diabetic medications include:  Mounjaro 15 mg once weekly   Endorses constipation   4 BM a week  Does not drink sufficient water - diarrhea  Denies diarrhea     Clarifications to above regimen: takes Mounjaro on Sundays  Adverse Effects: GI upset with metformin    Glucose Readings: patient does not check sugars she states she just got a new meter and hasn't started using it   Glucometer/CGM Type: Trumetrix    BMI: 40; >/= 35? Yes, Class 3 obesity  Current Weight:  3/3/25 - 251 lbs   235 lbs    Lifestyle:   Diet  Arnulfo judy sandwich   No lunch  Last night:  5 chicken wings  Salad  Exercise    Secondary Prevention:  Statin? Yes - atorvastatin 40 mg daily  ACE-I/ARB? Yes lisinopril 5 mg daily  Aspirin? No    Pertinent PMH Review:  PMH of Pancreatitis: No  PMH of Retinopathy: No  PMH of Urinary Tract Infections: Yes  PMH of MTC: No  HF: No  _____________________________________________________________________________      PHARMACIST MEDICATION REVIEW    Drug Interactions  The following drug interactions were noted:    High anticholinergic burden in elderly  In patients over 65 years of age these can cause adverse events, such as confusion, dizziness and falls. These have been shown to increase patient mortality.  Anticholinergic Calculator   A score of 3+ is associated with an increased cognitive impairment and mortality  Patient's Score -  12  Scheduled medications  Quetiapine = 3   Recommend to trial trazodone as an alternative  Oxybutynin = 3  PRN  Cyclobenzaprine = 3  Donnatal = 3    Medication System Management  Patient's preferred pharmacy:   [facility]  Medication Access:  Estimated Annual Income of $50,000 a year  Patient qualifies for  PAP   passed - 2024  Combivent has $35 copay- copay coupon is for $35 copay  Patient states [employer] sent her a letter that they weren't covering this? Test billing shows it is covered  Mounjaro copay card  RXBIN: [id number]  PCN: [id number]  GRP: [id number]  ID: [id number]  Expiration Date: 12/31/2025  Trelegy has $50 copay  Signed patient up for Copay card  BIN: [id number]  PCN: [id number]  Grp: [id number]  ID: [id number]  Adherence: satisfactory  Prior was only taking trelegy prn, but after hospitalization she has been taking it correctly  Have you had any missed doses? No  Allergies   Reviewed? Yes  Changes? No    Medication Reconciliation:  OTCs? None  Dispense History Review    _____________________________________________________________________________  Objective     Lab Review  Hepatic dysfunction?  Yes, LFTs wnl but patient has either active/past steatosis liver  Kidney  Use adjusted body weight  IBW: 135lbs  AdjBW: 185 lbs    CrCl: 167; GFR: 65 (77)  CT angio chest for pulmonary embolism Result Date: 12/31/2024  Chronic changes suggesting COPD and mild emphysema with bronchial wall thickening in the bilateral lower lobes and right upper lobe which may indicate acute or chronic bronchitis. Subpleural area of nodularity in the lateral aspect of the right lower lobe measuring up to 2.1 cm which may represent rounded atelectasis however neoplasm is not entirely excluded. Further evaluation is recommended with nonemergent PET/CT. Right upper lobe pleural parenchymal scarring. Prominent but nonpathologically enlarged mediastinal and bilateral hilar lymph nodes, most likely reactive however these can also be  better assessed on PET/CT. Pronounced cardiomegaly.     Medication Review  Current Outpatient Medications   Medication Instructions    ammonium lactate (Lac-Hydrin) 12 % lotion As needed    blood sugar diagnostic (Blood Glucose Test) strip Test once to twice daily    blood-glucose meter misc Test once to twice daily    cyclobenzaprine (FLEXERIL) 10 mg, oral, 3 times daily PRN    fluticasone-umeclidin-vilanter (TRELEGY-ELLIPTA) 100-62.5-25 mcg blister with device 1 puff, inhalation, Daily    furosemide (LASIX) 20 mg, oral, Daily PRN    ipratropium-albuteroL (Combivent Respimat)  mcg/actuation inhaler 1 puff, inhalation, 4 times daily PRN    lancets misc Test once daily as directed    levothyroxine (SYNTHROID, LEVOXYL) 150 mcg, oral, Daily    lisinopril 5 mg, oral, Daily    Mounjaro 15 mg, subcutaneous, Every 7 days    omeprazole (PRILOSEC) 40 mg, oral, Daily PRN    oxybutynin XL (Ditropan-XL) 15 mg 24 hr tablet 1 tablet, Daily (0630)    PHENobarbital-hyoscyamine-atropine-scopoloamine (Donnatal) 16.2-0.1037 -0.0194 mg tablet 1-2 tablets, oral, 3 times daily    polyethylene glycol (GLYCOLAX, MIRALAX) 17 g, Daily PRN    QUEtiapine (SEROQUEL) 50 mg, oral, Nightly    rosuvastatin (CRESTOR) 40 mg, oral, Daily    valACYclovir (VALTREX) 500 mg, oral, 3 times daily PRN      BP Readings from Last 2 Encounters:   05/05/25 (!) 142/8   03/03/25 144/80     BMI Readings from Last 1 Encounters:   05/05/25 36.81 kg/m²      Lab Results   Component Value Date    HGBA1C 6.3 05/05/2025    HGBA1C 10.0 (H) 02/03/2025    HGBA1C 8.0 (H) 12/10/2024     Lab Results   Component Value Date    CALCIUM 10.8 (H) 02/03/2025     02/03/2025    K 3.8 02/03/2025    CO2 30 02/03/2025     02/03/2025    BUN 16 02/03/2025    CREATININE 0.98 02/03/2025    EGFR 64 02/03/2025     Lab Results   Component Value Date    ALT 20 02/03/2025    AST 20 02/03/2025    ALKPHOS 88 02/03/2025    BILITOT 0.5 02/03/2025     FLP  Lab Results   Component  Value Date    TRIG 250 (H) 2025    CHOL 121 2025    LDLF 48 2023    LDLCALC 56 2025    HDL 36 (L) 2025     Urine Microalbumin  Lab Results   Component Value Date    MICROALBCREA 13.0 2023     Weight Management  Wt Readings from Last 3 Encounters:   25 107 kg (235 lb)   25 109 kg (240 lb)   25 112 kg (246 lb)    _____________________________________________________________________________    Assessment/Plan   Problem List Items Addressed This Visit       Acute bronchitis with chronic obstructive pulmonary disease (COPD) (Multi)    Relevant Orders    Referral to Nutrition Services    Referral to Clinical Pharmacy    Nicotine dependence    Relevant Orders    Referral to Nutrition Services    Referral to Clinical Pharmacy    Obesity with body mass index 30 or greater    Relevant Orders    Referral to Nutrition Services    Referral to Clinical Pharmacy    Persistent cough    Relevant Orders    Referral to Nutrition Services    Referral to Clinical Pharmacy    DM type 2, not at goal - Primary    Relevant Orders    Referral to Nutrition Services    Referral to Clinical Pharmacy    HTN (hypertension)    Relevant Orders    Referral to Nutrition Services    Referral to Clinical Pharmacy    Hypercholesterolemia    Relevant Orders    Referral to Nutrition Services    Referral to Clinical Pharmacy    Hyperglycemia    Relevant Orders    Referral to Nutrition Services    Referral to Clinical Pharmacy    Peripheral artery insufficiency    Relevant Orders    Referral to Nutrition Services    Referral to Clinical Pharmacy    Transient ischemic attack    Relevant Orders    Referral to Nutrition Services    Referral to Clinical Pharmacy     Patient Goals  Fasting B - 130 mg/dL  Postprandial BG: less than 180 mg/dL  A1c: less than 7%;  Is pt at goal? No, 6.5  Most likely due to courses of steroids and hospitalization  Patient's SMBGs are unknown    COPD  Patient with COPD that  is improved, needs further observation, and needs improvement  Continue GDMT and continue to monitor prn inhaler use for efficacy and optimization. Will consider switching to DAVID rescue inhaler instead of TARYN/DAVID . Will consider pulmonary rehab. Patient to work on diet to help lose weight and ease burden on lungs.   Medication Changes:  CONTINUE  All medications  Monitoring and Education:  Adherence   Rescue inhaler/nebulizer use  Supplemental O2 use  Symptoms of COPD  Exacerbations    DIABETES MELLITUS TYPE 2   Diabetes mellitus Type II, under unsatisfactory control. Patient's hhba1c has improved to 6.5% from  10% on 2/3/25 Reinforced healthy diet, lifestyle, and exercise.  Prescription medication ordered.  Lab work ordered.   Patient to work on diet: increase fiber and protein decrease processed foods. Start going to rec center 2 times a week to walk and improve respiratory stamina/endurance . Referral to nutrition  Rationale: Continue mounjaro for further glycemic benefit, T2DM disease control, weight loss, and CVD/renal protection  Candidate for SGLT2? potentially but does have a history of UTIs  Candidate for DPP4? No; not on glp-1  Candidate for Metformin? Patient stopped   Candidate for Insulin? No; A1c >/= 11? No    Medication Changes:  Continue Mounjaro to 15 mg once weekly for two more doses    Future Considerations:  Plan to discontinue metformin after repeat hba1c   Due to steroids course will not discontinue metformin    Monitoring  RFP, LFTs, weight, bmi, hba1c, lipids  Discussed general issues about diabetes pathophysiology and management.  Addressed ADA diet.  Suggested low cholesterol diet.  Encouraged aerobic exercise.  Discussed foot care.    Education:   Diet   Exercise    Clinical Pharmacist follow-up: 2 weeks, Telehealth visit    Continue all meds under the continuation of care with the referring provider and clinical pharmacy team.    Thank you,  Jeanette Parekh, PharmD   Clinical Pharmacist  Specialist, Primary Care   Phone: [phone]   Fax: [phone]   Email: [email or web address]    Verbal consent to manage patient's drug therapy was obtained from the patient. They were informed they may decline to participate or withdraw from participation in pharmacy services at any time

## 2025-07-03 ENCOUNTER — PATIENT OUTREACH (OUTPATIENT)
Dept: CARE COORDINATION | Facility: CLINIC | Age: 65
End: 2025-07-03
Payer: COMMERCIAL

## 2025-07-03 NOTE — PROGRESS NOTES
Left message regarding referral from Dr. Abreu for DSME/weight loss. Name and contact info provided for return call

## 2025-07-07 ENCOUNTER — APPOINTMENT (OUTPATIENT)
Dept: PHARMACY | Facility: HOSPITAL | Age: 65
End: 2025-07-07
Payer: COMMERCIAL

## 2025-07-07 DIAGNOSIS — G45.9 TRANSIENT ISCHEMIC ATTACK: ICD-10-CM

## 2025-07-07 DIAGNOSIS — E11.9 DM TYPE 2, NOT AT GOAL: ICD-10-CM

## 2025-07-07 DIAGNOSIS — E66.9 OBESITY WITH BODY MASS INDEX 30 OR GREATER: ICD-10-CM

## 2025-07-07 DIAGNOSIS — I10 PRIMARY HYPERTENSION: ICD-10-CM

## 2025-07-07 DIAGNOSIS — J44.0 ACUTE BRONCHITIS WITH CHRONIC OBSTRUCTIVE PULMONARY DISEASE (COPD) (MULTI): ICD-10-CM

## 2025-07-07 DIAGNOSIS — R73.9 HYPERGLYCEMIA: ICD-10-CM

## 2025-07-07 DIAGNOSIS — E78.00 HYPERCHOLESTEROLEMIA: ICD-10-CM

## 2025-07-07 DIAGNOSIS — J44.1 COPD WITH ACUTE EXACERBATION (MULTI): ICD-10-CM

## 2025-07-07 DIAGNOSIS — R05.3 PERSISTENT COUGH: ICD-10-CM

## 2025-07-07 DIAGNOSIS — J20.9 ACUTE BRONCHITIS WITH CHRONIC OBSTRUCTIVE PULMONARY DISEASE (COPD) (MULTI): ICD-10-CM

## 2025-07-07 DIAGNOSIS — F17.200 NICOTINE DEPENDENCE, UNCOMPLICATED, UNSPECIFIED NICOTINE PRODUCT TYPE: ICD-10-CM

## 2025-07-07 DIAGNOSIS — I73.9 PERIPHERAL ARTERY INSUFFICIENCY: ICD-10-CM

## 2025-07-07 ASSESSMENT — ENCOUNTER SYMPTOMS
FATIGUE: 1
POLYDIPSIA: 1
BLURRED VISION: 0
POLYPHAGIA: 0
WEAKNESS: 1
VISUAL CHANGE: 0

## 2025-07-07 NOTE — PATIENT INSTRUCTIONS
Medication Changes:  Continue Mounjaro to 15 mg once weekly  Monitoring: Labs before PCP appt (plans to move PCP appt up for [facility] documentation/privileges   Ensure copay is ok or decreased prior to getting them done      Combivent has $35 copay- copay coupon is for $35 copay  Patient states [employer] sent her a letter that they weren't covering this? Test billing shows it is covered  BIN: [id number]  PCN: [facility]  ISSUER: [id number])  GRP: [id number]  ID: [id number]    Mounjaro copay card  RXBIN: [id number]  PCN: [id number]  GRP: [id number]  ID: [id number]  Expiration Date: 12/31/2025  [facility] has $50 copay  Signed patient up for Copay card  BIN: [id number]  PCN: [id number]  Grp: [id number]  ID: [id number]

## 2025-07-07 NOTE — PROGRESS NOTES
Pharmacy Post-Discharge Clinical Pharmacist Appointment    Patient ID: [id number]  Danelle Jacobs is a 64 y.o. female who presents for Follow Up appointment. She was referred to Clinical Pharmacy Team to complete a post-discharge medication optimization and monitoring visit.  Reason for Referral: cost assistance with trelegy and Mounjaro   Referring Provider and PCP: Junior Abreu, DO   Last visit with PCP: 5/5/25   DM type 2, not at goal  went from  10 to 6.3  on mounjaor lost 10-20 lbs, doing well no issues  POCT Glycosylated Hemoglobin (HGB A1C) docked device  doing well no issues  follow 3 months   Next visit with PCP: 9/8/25    Subjective     Past Medical History:   Diagnosis Date    Arthritis     Bronchopneumonia, unspecified organism     Bronchial pneumonia    Cellulitis of right lower limb     Cellulitis of right leg    COPD (chronic obstructive pulmonary disease) (Multi)     Diabetes mellitus (Multi)     Disease of thyroid gland     Fatty (change of) liver, not elsewhere classified     Fatty infiltration of liver    Heart murmur     Hypertension     Localized edema 03/04/2015    Leg edema    Other long term (current) drug therapy     Long-term use of high-risk medication    Other malaise     Malaise and fatigue    Otitis media, unspecified, left ear     Left otitis media    Personal history of diseases of the skin and subcutaneous tissue     History of actinic keratosis    Personal history of other diseases of the circulatory system     History of rheumatic fever    Personal history of other diseases of the female genital tract     History of ovarian cyst    Personal history of other diseases of the nervous system and sense organs     History of sciatica    Personal history of other diseases of the nervous system and sense organs     History of carpal tunnel syndrome    Personal history of other diseases of the respiratory system     History of bronchitis    Personal history of other diseases of the  respiratory system     History of sinusitis    Personal history of other diseases of the respiratory system     History of chronic obstructive lung disease    Personal history of other endocrine, nutritional and metabolic disease     History of hypothyroidism    Personal history of other endocrine, nutritional and metabolic disease     History of hyperlipidemia    Personal history of other infectious and parasitic diseases     History of herpes labialis    Personal history of other mental and behavioral disorders     History of depression    Scoliosis     Urinary tract infection       Social Hx:  Reports that she quit smoking about 7 years ago. Her smoking use included cigarettes. She has never used smokeless tobacco. She reports that she does not currently use alcohol. She reports that she does not use drugs.     Family History   Problem Relation Name Age of Onset    Other (alzheimers dementia) Mother      Other (cerebrovascular accident) Father      Other (htn) Father      Miscarriages / Stillbirths Mother Rama He     Blood clot Mother Rama He     Alcohol abuse Father Johnny He     Hypertension Father Johnny He     Stroke Father Johnny He     Heart disease Father Johnny He     Learning disabilities Son Johnny and Jac Jacobs     Learning disabilities Son Johnny and Jac Jacobs      Patient states she fell the other day and is a little sore.     COPD  Patient has been diagnosed with: COPD and Acute exacerbation of chronic bronchitis  Does patient see pulmonology: No   Pulm workup completed at end of May 2025 - worsened    Current Regimen  3 L of supplemental O2  Does not have to use oxygen while sitting  Does not use overnight - endorses poor sleep and rescue inhaler use in the AM  Trelegy once daily  Taking regularly   Combivent QID prn  Duonebs prn    Clarifications to above regimen: 3 L   Adverse Effects: none   Appropriate technique? Yes  Prior to hospitalization the patient  was only taking trelegy prn and not daily. Now that she takes this daily she feels like it has helped decrease SOB, wheezing, etc    Symptom Management  Current symptoms: fatigue  Triggers: exertion and tired cold  Warming   Alleviating factors: oxygen and nebulizer    Exacerbation Hx  When was your last hospitalization for an exacerbation? 1/6    Rescue Inhaler Use  How often do you use your rescue inhaler? 2x a day    Immunization History:  Influenza: Date [2024]  COVID: Date [DUE]  RSV: Date [2024]    Diabetes  She presents for her initial diabetic visit. She has type 2 diabetes mellitus. The initial diagnosis of diabetes was made 6 years ago. Her disease course has been improving. Associated symptoms include fatigue, polydipsia and weakness. Pertinent negatives for diabetes include no blurred vision, no chest pain, no foot paresthesias, no foot ulcerations, no polyphagia, no polyuria and no visual change. Symptoms are stable. Risk factors for coronary artery disease include hypertension, obesity, tobacco exposure, dyslipidemia and diabetes mellitus. Current diabetic treatments: Mounjaro 7.5 mg once weekly and metformin 500 mg twice daily.     Known diabetic complications: peripheral neuropathy, cardiovascular disease, cerebrovascular disease, peripheral vascular disease, and obesity  Does patient follow with Endocrinology: No  **-- patient denies sores or cuts on feet today     Current diabetic medications include:  Mounjaro 15 mg once weekly   Endorses constipation   4 BM a week  Does not drink sufficient water - diarrhea  Denies diarrhea     Clarifications to above regimen: takes Mounjaro on Sundays  Adverse Effects: GI upset with metformin    Glucose Readings: patient does not check sugars she states she just got a new meter and hasn't started using it   Glucometer/CGM Type: Nicolette    BMI: 40; >/= 35? Yes, Class 3 obesity  Current Weight:  3/3/25 - 251 lbs   6/23/25 - 235 lbs  7/7/25 - 233  lbs    Lifestyle:   Diet  Arnulfo judy sandwich   No lunch  Last night:  5 chicken wings  Salad  Exercise: restricted due to COPD, trying to do more household chores    Secondary Prevention:  Statin? Yes - atorvastatin 40 mg daily  ACE-I/ARB? Yes lisinopril 5 mg daily  Aspirin? No    Pertinent PMH Review:  PMH of Pancreatitis: No  PMH of Retinopathy: No  PMH of Urinary Tract Infections: Yes  PMH of MTC: No  HF: No  _____________________________________________________________________________    PHARMACIST MEDICATION REVIEW    Drug Interactions  The following drug interactions were noted:    High anticholinergic burden in elderly  In patients over 65 years of age these can cause adverse events, such as confusion, dizziness and falls. These have been shown to increase patient mortality.  Anticholinergic Calculator   A score of 3+ is associated with an increased cognitive impairment and mortality  Patient's Score - 12  Scheduled medications  Quetiapine = 3   Recommend to trial trazodone as an alternative  Oxybutynin = 3  PRN  Cyclobenzaprine = 3  Donnatal = 3    Medication System Management  Patient's preferred pharmacy:   [facility]  $120 for 3 month supply of combivent and trelegy  Copay coupon is cheaper  Medication Access:  Estimated Annual Income of $50,000 a year  Patient qualifies for [facility]  Still does not have taxes from 2024    passed - 2024  Combivent has $35 copay- copay coupon is for $35 copay  [facility] states [facility] sent her a letter that they weren't covering this? Test billing shows it is covered  BIN: [id number]  PCN: [facility]  ISSUER: ([id number])  GRP: [id number]  ID: [id number]    Mounjaro copay card  RXBIN: [id number]  PCN: [id number]  GRP: [id number]  ID: [id number]  Expiration Date: 12/31/2025  [facility] has $50 copay  Signed patient up for Copay card  BIN: [id number]  PCN: [id number]  Grp: [id number]  ID: [id number]  Adherence: satisfactory  Prior was only taking trelegy prn, but after  hospitalization she has been taking it correctly  Have you had any missed doses? No  Allergies   Reviewed? Yes  Changes? No    Medication Reconciliation:  OTCs? None  Dispense History Review    _____________________________________________________________________________  Objective     Lab Review  Hepatic dysfunction?  Yes, LFTs wnl but patient has either active/past steatosis liver  Kidney  Use adjusted body weight  IBW: 135lbs  AdjBW: 185 lbs    CrCl: 167; GFR: 65 (77)  CT angio chest for pulmonary embolism Result Date: 12/31/2024  Chronic changes suggesting COPD and mild emphysema with bronchial wall thickening in the bilateral lower lobes and right upper lobe which may indicate acute or chronic bronchitis. Subpleural area of nodularity in the lateral aspect of the right lower lobe measuring up to 2.1 cm which may represent rounded atelectasis however neoplasm is not entirely excluded. Further evaluation is recommended with nonemergent PET/CT. Right upper lobe pleural parenchymal scarring. Prominent but nonpathologically enlarged mediastinal and bilateral hilar lymph nodes, most likely reactive however these can also be better assessed on PET/CT. Pronounced cardiomegaly.     Medication Review  Current Outpatient Medications   Medication Instructions    ammonium lactate (Lac-Hydrin) 12 % lotion As needed    blood sugar diagnostic (Blood Glucose Test) strip Test once to twice daily    blood-glucose meter misc Test once to twice daily    cyclobenzaprine (FLEXERIL) 10 mg, oral, 3 times daily PRN    fluticasone-umeclidin-vilanter (TRELEGY-ELLIPTA) 100-62.5-25 mcg blister with device 1 puff, inhalation, Daily    furosemide (LASIX) 20 mg, oral, Daily PRN    ipratropium-albuteroL (Combivent Respimat)  mcg/actuation inhaler 1 puff, inhalation, 4 times daily PRN    lancets misc Test once daily as directed    levothyroxine (SYNTHROID, LEVOXYL) 150 mcg, oral, Daily    lisinopril 5 mg, oral, Daily    Mounjaro 15 mg,  subcutaneous, Every 7 days    omeprazole (PRILOSEC) 40 mg, oral, Daily PRN    oxybutynin XL (Ditropan-XL) 15 mg 24 hr tablet 1 tablet, Daily (630)    PHENobarbital-hyoscyamine-atropine-scopoloamine (Donnatal) 16.2-0.1037 -0.0194 mg tablet 1-2 tablets, oral, 3 times daily    polyethylene glycol (GLYCOLAX, MIRALAX) 17 g, Daily PRN    QUEtiapine (SEROQUEL) 50 mg, oral, Nightly    rosuvastatin (CRESTOR) 40 mg, oral, Daily    valACYclovir (VALTREX) 500 mg, oral, 3 times daily PRN      BP Readings from Last 2 Encounters:   25 (!) 142/8   25 144/80     BMI Readings from Last 1 Encounters:   25 36.81 kg/m²      Lab Results   Component Value Date    HGBA1C 6.3 2025    HGBA1C 10.0 (H) 2025    HGBA1C 8.0 (H) 12/10/2024     Lab Results   Component Value Date    CALCIUM 10.8 (H) 2025     2025    K 3.8 2025    CO2 30 2025     2025    BUN 16 2025    CREATININE 0.98 2025    EGFR 64 2025     Lab Results   Component Value Date    ALT 20 2025    AST 20 2025    ALKPHOS 88 2025    BILITOT 0.5 2025     FLP  Lab Results   Component Value Date    TRIG 250 (H) 2025    CHOL 121 2025    LDLF 48 2023    LDLCALC 56 2025    HDL 36 (L) 2025     Lab Results   Component Value Date    MICROALBCREA 13.0 2023     Wt Readings from Last 3 Encounters:   25 107 kg (235 lb)   25 109 kg (240 lb)   25 112 kg (246 lb)    _____________________________________________________________________________    Assessment/Plan   Problem List Items Addressed This Visit    None  Patient Goals  Diabetes  Fasting B - 130 mg/dL  Postprandial BG: less than 180 mg/dL  A1c: less than 6.5%   Weight Loss  BMI <25  2.5% weight loss in one month   At least 10% weight loss in 6 months  Cholesterol  LDL-C <55  TG <150   Blood Pressure   /80    COPD  Patient with COPD that is improved, needs further  observation, and needs improvement  Continue GDMT and continue to monitor prn inhaler use for efficacy and optimization. Will consider switching to DAVID rescue inhaler instead of TARYN/DAVID . Will consider pulmonary rehab.   Patient to work on diet to help lose weight and ease burden on lungs. Patient has increased rescue inhaler use in AM as she is not using her oxygen at night as directed by her pulmonologist in June. Patient to use O2 as directed.   Medication Changes:  CONTINUE  All medications  Monitoring and Education:  Adherence   Rescue inhaler/nebulizer use  Symptoms of COPD  Exacerbations    DIABETES MELLITUS TYPE 2   Diabetes mellitus Type II, under unsatisfactory control. Patient's hhba1c has improved to 6.3% from 10% on 2/3/25 Reinforced healthy diet, lifestyle, and exercise.  Prescription medication ordered.  Lab work ordered. Constipation has resolved with increased water intake. Patient denies issues with Mounjaro  Patient to work on diet: increase fiber and protein decrease processed foods. Start going to rec center 2 times a week to walk and improve respiratory stamina/endurance . Referral to nutrition (appt not made yet). Continue mounjaro for further glycemic benefit, T2DM disease control, weight loss, and CVD/renal protection  Candidate for SGLT2? potentially but does have a history of UTIs  Candidate for DPP4? No; not on glp-1  Candidate for Metformin? Patient stopped   Candidate for Insulin? No; A1c >/= 11? No  Medication Changes:  Continue Mounjaro to 15 mg once weekly  Monitoring: Labs before PCP appt (plans to move PCP appt up for [facility] documentation/privileges   Ensure copay is ok or decreased prior to getting them done      Education:   Diet   Exercise    Clinical Pharmacist follow-up: 3 months, Telehealth visit    Continue all meds under the continuation of care with the referring provider and clinical pharmacy team.    Thank you,  Jeanette Parekh, PharmD   Clinical Pharmacist Specialist,  Primary Care   Phone: [phone]   Fax: [phone]   Email: [email or web address]    Verbal consent to manage patient's drug therapy was obtained from the patient. They were informed they may decline to participate or withdraw from participation in pharmacy services at any time

## 2025-07-09 ENCOUNTER — PATIENT OUTREACH (OUTPATIENT)
Dept: CARE COORDINATION | Facility: CLINIC | Age: 65
End: 2025-07-09
Payer: COMMERCIAL

## 2025-07-15 ENCOUNTER — APPOINTMENT (OUTPATIENT)
Dept: CARE COORDINATION | Facility: CLINIC | Age: 65
End: 2025-07-15
Payer: COMMERCIAL

## 2025-07-16 DIAGNOSIS — I10 PRIMARY HYPERTENSION: ICD-10-CM

## 2025-07-16 RX ORDER — LISINOPRIL 5 MG/1
5 TABLET ORAL DAILY
Qty: 90 TABLET | Refills: 3 | Status: SHIPPED | OUTPATIENT
Start: 2025-07-16

## 2025-07-18 NOTE — PROGRESS NOTES
"INITIAL DIABETES EDUCATION VISIT    Reason for Nutrition Visit:  Pt is a 64 y.o. female being seen {ACOlocations:11277} referred for {ACOopentext:70723}    Past Medical Hx:  Problem List[1]     Diabetes related History:  {Type of Diabetes:}   What year were you diagnosed with diabetes?   Family History:  Have you had diabetes education in the past? {ACOYes/No/Unsure:22232}  What Concerns you most about having diabetes?    Health Status:  Tobacco: {Smoking/Tobacco Use:}  ETOH: {Alcohol Use:}  Comorbidities/Chronica Complications: {Comorbidities/Chronic Complications:}    Diabetes Self-Management Skills and Behaviors:   {Do you exercise regularly?:16350}  {Physical Activity (Optional):}  {Do you get at least 7 hours of sleep each night?:85360}  {How do you manage your diabetes when you are sick?:}  Fasting BG range: {Blank multiple:44417::\"***\"}mg/dl  Postprandial BG range: {Blank multiple:48046::\"***\"}mg/dl      Current Medications:   Medications Ordered Prior to Encounter[2]  {Diabetes Medications:}  Injection/Infusion Sites: {Injection/Infusion:}. {Injection/Infusion:}  {Monitorn}  {Acute Complications-Safety:}    {Hypoglycemia:}  Hypoglycemia Treatment:  Symptoms?{ACO YES/NO/OTHER:69100}    {Hyperglycemia:}  Hyperglycemia Treatment:  Symptoms?{ACO YES/NO/OTHER:80077}    Labs:  Lab Results   Component Value Date    HGBA1C 6.3 2025    HGBA1C 10.0 (H) 2025    HGBA1C 8.0 (H) 12/10/2024     2025    K 3.8 2025     2025    CO2 30 2025    BUN 16 2025    CREATININE 0.98 2025    CALCIUM 10.8 (H) 2025    ALBUMIN 4.1 2025    PROT 7.1 2025    BILITOT 0.5 2025    ALKPHOS 88 2025    ALT 20 2025    AST 20 2025    GLUCOSE 137 (H) 2025     Lab Results   Component Value Date    CHOL 121 2025    LDLCALC 56 2025    TRIG 250 (H) 2025    HDL 36 " "(L) 2025    LDLF 48 2023        Comments:     CMP:    Lipid panel:    Vitamin D:    A1C:     Personal Care/ Health Care:  Has your Doc examined your feet in the last 12 months? {YES/NO:28344}  How often do you check your feet? {Blank multiple:62891}    Have you had a DM eye exam? {YES/NO:82178}  Have you seen your dentist in the last 12 months? {YES/NO:90419}    Anthropometrics:   Ht Readings from Last 1 Encounters:   25 1.702 m (5' 7\")      BMI Readings from Last 1 Encounters:   25 36.81 kg/m²      Wt Readings from Last 10 Encounters:   25 107 kg (235 lb)   25 109 kg (240 lb)   25 112 kg (246 lb)   25 117 kg (257 lb)   25 118 kg (260 lb)   24 120 kg (265 lb)   24 120 kg (265 lb)   12/10/24 122 kg (269 lb 3.2 oz)   24 118 kg (260 lb 2.3 oz)   24 118 kg (260 lb)      Weight change:    Weight Change:   {Significant Weight Change:80287}    Diabetes Assessment:   DM Interferes with other aspects of my life: {AgreeNeutralDisagree:92144}.  My level of stress is high: {AgreeNeutralDisagree:03539}.  I struggle with making changes in my life: {AgreeNeutralDisagree:21955}.  How do you handle stress?  What are your current stressors?    DSME - Meal Planning and Diet Recall  Are you currently following any meal plan? {MEAL PLAN:94866}    Does your culture or Judaism require any of the following: {Anabaptist/CULTURE PLANNIN}  Who does the grocery shopping? {PERSONS:26924}  Who does the cooking in the home? {PERSONS:57780}  Food {Allergies:64735}    {Intolerance:30065}   {Appetite:77356}     24 Diet Recall:  Meal 1:   Meal 2:  Meal 3:  Snacks:    Beverages:   Eating out:    Alcohol Intake:    Self-Identified Challenges:     Visit Summary:           Nutrition Diagnosis:  Diagnosis Statement 1:  {Diagnosis Status:36127}  {Diagnosis (Optional):29221} related to {ACONutritionEtiologies:09997} as evidenced by {ACOSigns/Symptoms:30049}    Diagnosis " Statement 2:  {Diagnosis Status:07410}  {Diagnosis (Optional):88940} related to {ACONutritionEtiologies:19883} as evidenced by {ACOSigns/Symptoms:81619}    Nutrition Interventions:  Provided nutrition education on the following topic(s): {ACOEducation:92281} using {ACONutritionCounselin}  Referred pt to {Coordination of Care:88770}  Discussed with pt? {ACOYes/No/open:59913}  Provided handouts on:     DSME Topics  {TOPICS:12614}    Nutrition Goals:  {Nutrition Goals (Optional):46685}   {Nutrition Goals (Optional):83519}  {Nutrition Goals (Optional):57143}        Follow up Plan  Will follow-up x {ACOfrequency:07394}         [1]   Patient Active Problem List  Diagnosis    Epigastric pain    Acute bronchitis with chronic obstructive pulmonary disease (COPD) (Multi)    Carpal tunnel syndrome    Acute cystitis with hematuria    Lumbosacral strain    Arthritis    Back pain, chronic    Bilateral edema of lower extremity    Altered bowel function    Nicotine dependence    Obesity with body mass index 30 or greater    Coccyxdynia    Contusion of rib    Persistent cough    Daytime somnolence    Diabetes mellitus, stable (Multi)    DM type 2, not at goal    Dysuria    Exposure to SARS virus    Gastroesophageal reflux disease    Gluteal pain    Hand arthritis    HTN (hypertension)    Hypercholesterolemia    Hyperglycemia    Hypothyroidism    Irritable bowel syndrome    Insomnia    Lumbar back pain    Mass of anus    Mass of sacrum    Spasm    Nausea and vomiting    Peripheral artery insufficiency    Sore throat    Stasis edema of left lower extremity    Transient ischemic attack    Urinary tract infection    Venous insufficiency of lower extremity    DDD (degenerative disc disease), lumbosacral    Sacroiliitis, not elsewhere classified    Bronchopneumonia    Disorder of anus    History of depression    History of elevated lipids    History of hypothyroidism    History of rheumatic fever    History of viral infection     Injury of low back    Malaise and fatigue    Otitis media    Steatosis of liver    Acute hypoxic respiratory failure   [2]   Current Outpatient Medications on File Prior to Visit   Medication Sig Dispense Refill    ammonium lactate (Lac-Hydrin) 12 % lotion Apply topically if needed for dry skin. Apply to dry and rough skin area (avoid face)      blood sugar diagnostic (Blood Glucose Test) strip Test once to twice daily 100 strip 1    blood-glucose meter misc Test once to twice daily 1 each 0    cyclobenzaprine (Flexeril) 10 mg tablet Take 1 tablet (10 mg) by mouth 3 times a day as needed for muscle spasms. 60 tablet 1    fluticasone-umeclidin-vilanter (TRELEGY-ELLIPTA) 100-62.5-25 mcg blister with device Inhale 1 puff once daily. 30 each 11    furosemide (Lasix) 20 mg tablet TAKE 1 TABLET DAILY AS NEEDED FOR SWELLING 90 tablet 3    ipratropium-albuteroL (Combivent Respimat)  mcg/actuation inhaler Inhale 1 puff 4 times a day as needed for wheezing or shortness of breath. 4 g 11    lancets misc Test once daily as directed 100 each 1    levothyroxine (Synthroid, Levoxyl) 150 mcg tablet Take 1 tablet (150 mcg) by mouth early in the morning.. 90 tablet 1    omeprazole (PriLOSEC) 40 mg DR capsule Take 1 capsule (40 mg) by mouth once daily as needed (heartburn). 90 capsule 3    oxybutynin XL (Ditropan-XL) 15 mg 24 hr tablet Take 1 tablet (15 mg) by mouth early in the morning..      PHENobarbital-hyoscyamine-atropine-scopoloamine (Donnatal) 16.2-0.1037 -0.0194 mg tablet Take 1-2 tablets by mouth 3 times a day. 30 tablet 0    QUEtiapine (SEROquel) 50 mg tablet Take 1 tablet (50 mg) by mouth once daily at bedtime. 90 tablet 1    rosuvastatin (Crestor) 40 mg tablet Take 1 tablet (40 mg) by mouth once daily. 90 tablet 1    tirzepatide (Mounjaro) 15 mg/0.5 mL pen injector Inject 15 mg under the skin every 7 days. 6 mL 3    valACYclovir (Valtrex) 500 mg tablet Take 1 tablet (500 mg) by mouth 3 times a day as needed (cold  sores). 30 tablet 5    [DISCONTINUED] lisinopril 5 mg tablet TAKE 1 TABLET DAILY 90 tablet 3     No current facility-administered medications on file prior to visit.

## 2025-08-13 DIAGNOSIS — G47.00 INSOMNIA, UNSPECIFIED TYPE: ICD-10-CM

## 2025-08-13 RX ORDER — QUETIAPINE FUMARATE 50 MG/1
50 TABLET, FILM COATED ORAL NIGHTLY
Qty: 90 TABLET | Refills: 1 | Status: SHIPPED | OUTPATIENT
Start: 2025-08-13 | End: 2026-02-09

## 2025-08-21 ENCOUNTER — TELEPHONE (OUTPATIENT)
Dept: CARE COORDINATION | Age: 65
End: 2025-08-21
Payer: COMMERCIAL

## 2025-09-08 ENCOUNTER — APPOINTMENT (OUTPATIENT)
Dept: PRIMARY CARE | Facility: CLINIC | Age: 65
End: 2025-09-08
Payer: COMMERCIAL

## 2025-09-29 ENCOUNTER — APPOINTMENT (OUTPATIENT)
Dept: PHARMACY | Facility: HOSPITAL | Age: 65
End: 2025-09-29
Payer: COMMERCIAL